# Patient Record
Sex: MALE | Race: WHITE | NOT HISPANIC OR LATINO | URBAN - METROPOLITAN AREA
[De-identification: names, ages, dates, MRNs, and addresses within clinical notes are randomized per-mention and may not be internally consistent; named-entity substitution may affect disease eponyms.]

---

## 2019-01-05 ENCOUNTER — APPOINTMENT (OUTPATIENT)
Dept: RADIOLOGY | Facility: MEDICAL CENTER | Age: 44
DRG: 963 | End: 2019-01-05
Payer: OTHER MISCELLANEOUS

## 2019-01-05 ENCOUNTER — HOSPITAL ENCOUNTER (OUTPATIENT)
Dept: RADIOLOGY | Facility: MEDICAL CENTER | Age: 44
End: 2019-01-05

## 2019-01-05 ENCOUNTER — APPOINTMENT (OUTPATIENT)
Dept: RADIOLOGY | Facility: MEDICAL CENTER | Age: 44
DRG: 963 | End: 2019-01-05
Attending: EMERGENCY MEDICINE
Payer: OTHER MISCELLANEOUS

## 2019-01-05 ENCOUNTER — HOSPITAL ENCOUNTER (INPATIENT)
Facility: MEDICAL CENTER | Age: 44
LOS: 39 days | DRG: 963 | End: 2019-02-13
Attending: EMERGENCY MEDICINE | Admitting: SURGERY
Payer: OTHER MISCELLANEOUS

## 2019-01-05 DIAGNOSIS — R40.2432 GLASGOW COMA SCALE TOTAL SCORE 3-8, AT ARRIVAL TO EMERGENCY DEPARTMENT (HCC): ICD-10-CM

## 2019-01-05 DIAGNOSIS — J96.01 ACUTE RESPIRATORY FAILURE WITH HYPOXIA (HCC): ICD-10-CM

## 2019-01-05 DIAGNOSIS — S06.33AA FOCAL HEMORRHAGIC CONTUSION OF CEREBRUM (HCC): ICD-10-CM

## 2019-01-05 DIAGNOSIS — S12.9XXA CLOSED FRACTURE OF MULTIPLE CERVICAL VERTEBRAE, INITIAL ENCOUNTER (HCC): ICD-10-CM

## 2019-01-05 DIAGNOSIS — F20.9 SCHIZOPHRENIA, UNSPECIFIED TYPE (HCC): ICD-10-CM

## 2019-01-05 PROBLEM — T14.90XA TRAUMA: Status: ACTIVE | Noted: 2019-01-05

## 2019-01-05 PROCEDURE — 5A1945Z RESPIRATORY VENTILATION, 24-96 CONSECUTIVE HOURS: ICD-10-PCS | Performed by: SURGERY

## 2019-01-05 PROCEDURE — 71045 X-RAY EXAM CHEST 1 VIEW: CPT

## 2019-01-05 PROCEDURE — 84478 ASSAY OF TRIGLYCERIDES: CPT

## 2019-01-05 PROCEDURE — 99291 CRITICAL CARE FIRST HOUR: CPT

## 2019-01-05 PROCEDURE — 85027 COMPLETE CBC AUTOMATED: CPT

## 2019-01-05 PROCEDURE — 85610 PROTHROMBIN TIME: CPT

## 2019-01-05 PROCEDURE — 82803 BLOOD GASES ANY COMBINATION: CPT

## 2019-01-05 PROCEDURE — 80307 DRUG TEST PRSMV CHEM ANLYZR: CPT

## 2019-01-05 PROCEDURE — 85730 THROMBOPLASTIN TIME PARTIAL: CPT

## 2019-01-05 PROCEDURE — 85347 COAGULATION TIME ACTIVATED: CPT

## 2019-01-05 PROCEDURE — G0390 TRAUMA RESPONS W/HOSP CRITI: HCPCS

## 2019-01-05 PROCEDURE — 770022 HCHG ROOM/CARE - ICU (200)

## 2019-01-05 PROCEDURE — 80053 COMPREHEN METABOLIC PANEL: CPT

## 2019-01-05 PROCEDURE — 85384 FIBRINOGEN ACTIVITY: CPT

## 2019-01-05 PROCEDURE — 83605 ASSAY OF LACTIC ACID: CPT

## 2019-01-05 PROCEDURE — 85576 BLOOD PLATELET AGGREGATION: CPT

## 2019-01-06 ENCOUNTER — APPOINTMENT (OUTPATIENT)
Dept: RADIOLOGY | Facility: MEDICAL CENTER | Age: 44
DRG: 963 | End: 2019-01-06
Attending: NURSE PRACTITIONER
Payer: OTHER MISCELLANEOUS

## 2019-01-06 PROBLEM — Z53.09 CONTRAINDICATION TO DEEP VEIN THROMBOSIS (DVT) PROPHYLAXIS: Status: ACTIVE | Noted: 2019-01-06

## 2019-01-06 PROBLEM — J95.821 RESPIRATORY FAILURE FOLLOWING TRAUMA AND SURGERY (HCC): Status: ACTIVE | Noted: 2019-01-06

## 2019-01-06 PROBLEM — S06.33AA FOCAL HEMORRHAGIC CONTUSION OF CEREBRUM (HCC): Status: ACTIVE | Noted: 2019-01-06

## 2019-01-06 PROBLEM — S12.9XXA CERVICAL SPINE FRACTURE (HCC): Status: ACTIVE | Noted: 2019-01-06

## 2019-01-06 LAB
ABO GROUP BLD: NORMAL
ACTION RANGE TRIGGERED IACRT: NO
ALBUMIN SERPL BCP-MCNC: 3.3 G/DL (ref 3.2–4.9)
ALBUMIN/GLOB SERPL: 1.1 G/DL
ALP SERPL-CCNC: 74 U/L (ref 30–99)
ALT SERPL-CCNC: 35 U/L (ref 2–50)
ANION GAP SERPL CALC-SCNC: 7 MMOL/L (ref 0–11.9)
APTT PPP: 31.4 SEC (ref 24.7–36)
AST SERPL-CCNC: 55 U/L (ref 12–45)
BASE EXCESS BLDA CALC-SCNC: -1 MMOL/L (ref -4–3)
BASE EXCESS BLDA CALC-SCNC: -3 MMOL/L (ref -4–3)
BILIRUB SERPL-MCNC: 0.4 MG/DL (ref 0.1–1.5)
BLD GP AB SCN SERPL QL: NORMAL
BODY TEMPERATURE: ABNORMAL CENTIGRADE
BODY TEMPERATURE: ABNORMAL DEGREES
BUN SERPL-MCNC: 13 MG/DL (ref 8–22)
CALCIUM SERPL-MCNC: 8.1 MG/DL (ref 8.5–10.5)
CFT BLD TEG: 6.5 MIN (ref 5–10)
CHLORIDE SERPL-SCNC: 104 MMOL/L (ref 96–112)
CLOT ANGLE BLD TEG: 74 DEGREES (ref 53–72)
CLOT LYSIS 30M P MA LENFR BLD TEG: 2.7 % (ref 0–8)
CO2 BLDA-SCNC: 22 MMOL/L (ref 20–33)
CO2 SERPL-SCNC: 21 MMOL/L (ref 20–33)
CREAT SERPL-MCNC: 0.76 MG/DL (ref 0.5–1.4)
CT.EXTRINSIC BLD ROTEM: 1.2 MIN (ref 1–3)
ERYTHROCYTE [DISTWIDTH] IN BLOOD BY AUTOMATED COUNT: 42.3 FL (ref 35.9–50)
ETHANOL BLD-MCNC: 0 G/DL
GLOBULIN SER CALC-MCNC: 2.9 G/DL (ref 1.9–3.5)
GLUCOSE BLD-MCNC: 109 MG/DL (ref 65–99)
GLUCOSE BLD-MCNC: 112 MG/DL (ref 65–99)
GLUCOSE BLD-MCNC: 125 MG/DL (ref 65–99)
GLUCOSE BLD-MCNC: 80 MG/DL (ref 65–99)
GLUCOSE SERPL-MCNC: 147 MG/DL (ref 65–99)
HCO3 BLDA-SCNC: 20.9 MMOL/L (ref 17–25)
HCO3 BLDA-SCNC: 22 MMOL/L (ref 17–25)
HCT VFR BLD AUTO: 35.7 % (ref 42–52)
HGB BLD-MCNC: 11.4 G/DL (ref 14–18)
HOROWITZ INDEX BLDA+IHG-RTO: 250 MM[HG]
INR PPP: 1.26 (ref 0.87–1.13)
INST. QUALIFIED PATIENT IIQPT: YES
LACTATE BLD-SCNC: 1 MMOL/L (ref 0.5–2)
MCF BLD TEG: 73.5 MM (ref 50–70)
MCH RBC QN AUTO: 28.9 PG (ref 27–33)
MCHC RBC AUTO-ENTMCNC: 31.9 G/DL (ref 33.7–35.3)
MCV RBC AUTO: 90.6 FL (ref 81.4–97.8)
O2/TOTAL GAS SETTING VFR VENT: 100 % (ref 30–60)
O2/TOTAL GAS SETTING VFR VENT: 40 %
PA AA BLD-ACNC: 0 %
PA ADP BLD-ACNC: 27.7 %
PCO2 BLDA: 31.2 MMHG (ref 26–37)
PCO2 BLDA: 33.8 MMHG (ref 26–37)
PCO2 TEMP ADJ BLDA: 29.2 MMHG (ref 26–37)
PH BLDA: 7.43 [PH] (ref 7.4–7.5)
PH BLDA: 7.44 [PH] (ref 7.4–7.5)
PH TEMP ADJ BLDA: 7.46 [PH] (ref 7.4–7.5)
PLATELET # BLD AUTO: 475 K/UL (ref 164–446)
PMV BLD AUTO: 8.2 FL (ref 9–12.9)
PO2 BLDA: 100 MMHG (ref 64–87)
PO2 BLDA: 192 MMHG (ref 64–87)
PO2 TEMP ADJ BLDA: 92 MMHG (ref 64–87)
POTASSIUM SERPL-SCNC: 4 MMOL/L (ref 3.6–5.5)
PROT SERPL-MCNC: 6.2 G/DL (ref 6–8.2)
PROTHROMBIN TIME: 15.9 SEC (ref 12–14.6)
RBC # BLD AUTO: 3.94 M/UL (ref 4.7–6.1)
RH BLD: NORMAL
SAO2 % BLDA: 98 % (ref 93–99)
SAO2 % BLDA: 99 % (ref 93–99)
SODIUM SERPL-SCNC: 132 MMOL/L (ref 135–145)
SPECIMEN DRAWN FROM PATIENT: ABNORMAL
TEG ALGORITHM TGALG: ABNORMAL
TRIGL SERPL-MCNC: 43 MG/DL (ref 0–149)
WBC # BLD AUTO: 21.6 K/UL (ref 4.8–10.8)

## 2019-01-06 PROCEDURE — 72128 CT CHEST SPINE W/O DYE: CPT

## 2019-01-06 PROCEDURE — 72125 CT NECK SPINE W/O DYE: CPT

## 2019-01-06 PROCEDURE — 71260 CT THORAX DX C+: CPT

## 2019-01-06 PROCEDURE — 72156 MRI NECK SPINE W/O & W/DYE: CPT

## 2019-01-06 PROCEDURE — 82962 GLUCOSE BLOOD TEST: CPT | Mod: 91

## 2019-01-06 PROCEDURE — 86901 BLOOD TYPING SEROLOGIC RH(D): CPT

## 2019-01-06 PROCEDURE — A9585 GADOBUTROL INJECTION: HCPCS | Performed by: NURSE PRACTITIONER

## 2019-01-06 PROCEDURE — 700101 HCHG RX REV CODE 250: Performed by: NURSE PRACTITIONER

## 2019-01-06 PROCEDURE — 86850 RBC ANTIBODY SCREEN: CPT

## 2019-01-06 PROCEDURE — 99291 CRITICAL CARE FIRST HOUR: CPT | Performed by: SURGERY

## 2019-01-06 PROCEDURE — 82803 BLOOD GASES ANY COMBINATION: CPT

## 2019-01-06 PROCEDURE — 36600 WITHDRAWAL OF ARTERIAL BLOOD: CPT

## 2019-01-06 PROCEDURE — 72131 CT LUMBAR SPINE W/O DYE: CPT

## 2019-01-06 PROCEDURE — 700111 HCHG RX REV CODE 636 W/ 250 OVERRIDE (IP): Performed by: SURGERY

## 2019-01-06 PROCEDURE — 700105 HCHG RX REV CODE 258: Performed by: NURSE PRACTITIONER

## 2019-01-06 PROCEDURE — 700117 HCHG RX CONTRAST REV CODE 255: Performed by: NURSE PRACTITIONER

## 2019-01-06 PROCEDURE — 86900 BLOOD TYPING SEROLOGIC ABO: CPT

## 2019-01-06 PROCEDURE — 70450 CT HEAD/BRAIN W/O DYE: CPT

## 2019-01-06 PROCEDURE — 770022 HCHG ROOM/CARE - ICU (200)

## 2019-01-06 PROCEDURE — 94002 VENT MGMT INPAT INIT DAY: CPT

## 2019-01-06 PROCEDURE — 700117 HCHG RX CONTRAST REV CODE 255: Performed by: EMERGENCY MEDICINE

## 2019-01-06 PROCEDURE — 71045 X-RAY EXAM CHEST 1 VIEW: CPT

## 2019-01-06 PROCEDURE — 700111 HCHG RX REV CODE 636 W/ 250 OVERRIDE (IP): Performed by: NURSE PRACTITIONER

## 2019-01-06 RX ORDER — DOCUSATE SODIUM 100 MG/1
100 CAPSULE, LIQUID FILLED ORAL 2 TIMES DAILY
Status: DISCONTINUED | OUTPATIENT
Start: 2019-01-06 | End: 2019-02-13 | Stop reason: HOSPADM

## 2019-01-06 RX ORDER — BISACODYL 10 MG
10 SUPPOSITORY, RECTAL RECTAL
Status: DISCONTINUED | OUTPATIENT
Start: 2019-01-06 | End: 2019-02-13 | Stop reason: HOSPADM

## 2019-01-06 RX ORDER — GADOBUTROL 604.72 MG/ML
6 INJECTION INTRAVENOUS ONCE
Status: COMPLETED | OUTPATIENT
Start: 2019-01-06 | End: 2019-01-06

## 2019-01-06 RX ORDER — ONDANSETRON 2 MG/ML
4 INJECTION INTRAMUSCULAR; INTRAVENOUS EVERY 4 HOURS PRN
Status: DISCONTINUED | OUTPATIENT
Start: 2019-01-06 | End: 2019-01-31

## 2019-01-06 RX ORDER — AMOXICILLIN 250 MG
1 CAPSULE ORAL NIGHTLY
Status: DISCONTINUED | OUTPATIENT
Start: 2019-01-06 | End: 2019-02-13 | Stop reason: HOSPADM

## 2019-01-06 RX ORDER — BACITRACIN ZINC AND POLYMYXIN B SULFATE 500; 1000 [USP'U]/G; [USP'U]/G
OINTMENT TOPICAL 3 TIMES DAILY
Status: DISCONTINUED | OUTPATIENT
Start: 2019-01-06 | End: 2019-01-20

## 2019-01-06 RX ORDER — DEXTROSE MONOHYDRATE 25 G/50ML
25 INJECTION, SOLUTION INTRAVENOUS
Status: DISCONTINUED | OUTPATIENT
Start: 2019-01-06 | End: 2019-01-09

## 2019-01-06 RX ORDER — SODIUM CHLORIDE 9 MG/ML
INJECTION, SOLUTION INTRAVENOUS CONTINUOUS
Status: DISCONTINUED | OUTPATIENT
Start: 2019-01-06 | End: 2019-01-10

## 2019-01-06 RX ORDER — ENEMA 19; 7 G/133ML; G/133ML
1 ENEMA RECTAL
Status: DISCONTINUED | OUTPATIENT
Start: 2019-01-06 | End: 2019-02-13 | Stop reason: HOSPADM

## 2019-01-06 RX ORDER — FAMOTIDINE 20 MG/1
20 TABLET, FILM COATED ORAL 2 TIMES DAILY
Status: DISCONTINUED | OUTPATIENT
Start: 2019-01-06 | End: 2019-01-08

## 2019-01-06 RX ORDER — POLYETHYLENE GLYCOL 3350 17 G/17G
1 POWDER, FOR SOLUTION ORAL 2 TIMES DAILY
Status: DISCONTINUED | OUTPATIENT
Start: 2019-01-06 | End: 2019-02-13 | Stop reason: HOSPADM

## 2019-01-06 RX ORDER — AMOXICILLIN 250 MG
1 CAPSULE ORAL
Status: DISCONTINUED | OUTPATIENT
Start: 2019-01-06 | End: 2019-02-13 | Stop reason: HOSPADM

## 2019-01-06 RX ADMIN — Medication 1 EACH: at 05:02

## 2019-01-06 RX ADMIN — FAMOTIDINE 20 MG: 10 INJECTION INTRAVENOUS at 17:28

## 2019-01-06 RX ADMIN — SODIUM CHLORIDE: 9 INJECTION, SOLUTION INTRAVENOUS at 01:05

## 2019-01-06 RX ADMIN — FENTANYL CITRATE 100 MCG: 50 INJECTION, SOLUTION INTRAMUSCULAR; INTRAVENOUS at 13:14

## 2019-01-06 RX ADMIN — FENTANYL CITRATE 100 MCG: 50 INJECTION, SOLUTION INTRAMUSCULAR; INTRAVENOUS at 16:57

## 2019-01-06 RX ADMIN — FENTANYL CITRATE 100 MCG: 50 INJECTION, SOLUTION INTRAMUSCULAR; INTRAVENOUS at 15:46

## 2019-01-06 RX ADMIN — FENTANYL CITRATE 100 MCG: 50 INJECTION, SOLUTION INTRAMUSCULAR; INTRAVENOUS at 12:00

## 2019-01-06 RX ADMIN — FENTANYL CITRATE 100 MCG: 50 INJECTION, SOLUTION INTRAMUSCULAR; INTRAVENOUS at 14:33

## 2019-01-06 RX ADMIN — SODIUM CHLORIDE 500 MG: 9 INJECTION, SOLUTION INTRAVENOUS at 01:06

## 2019-01-06 RX ADMIN — FENTANYL CITRATE 100 MCG: 50 INJECTION, SOLUTION INTRAMUSCULAR; INTRAVENOUS at 05:02

## 2019-01-06 RX ADMIN — SODIUM CHLORIDE 500 MG: 9 INJECTION, SOLUTION INTRAVENOUS at 12:00

## 2019-01-06 RX ADMIN — Medication 1 EACH: at 17:28

## 2019-01-06 RX ADMIN — FENTANYL CITRATE 75 MCG/HR: 50 INJECTION, SOLUTION INTRAMUSCULAR; INTRAVENOUS at 17:16

## 2019-01-06 RX ADMIN — PROPOFOL 40 MCG/KG/MIN: 10 INJECTION, EMULSION INTRAVENOUS at 13:49

## 2019-01-06 RX ADMIN — Medication 1 EACH: at 12:00

## 2019-01-06 RX ADMIN — FENTANYL CITRATE 100 MCG: 50 INJECTION, SOLUTION INTRAMUSCULAR; INTRAVENOUS at 02:35

## 2019-01-06 RX ADMIN — FENTANYL CITRATE 100 MCG: 50 INJECTION, SOLUTION INTRAMUSCULAR; INTRAVENOUS at 01:01

## 2019-01-06 RX ADMIN — PROPOFOL 5 MCG/KG/MIN: 10 INJECTION, EMULSION INTRAVENOUS at 01:29

## 2019-01-06 RX ADMIN — GADOBUTROL 6 ML: 604.72 INJECTION INTRAVENOUS at 03:00

## 2019-01-06 RX ADMIN — PROPOFOL 55 MCG/KG/MIN: 10 INJECTION, EMULSION INTRAVENOUS at 20:26

## 2019-01-06 RX ADMIN — SODIUM CHLORIDE: 9 INJECTION, SOLUTION INTRAVENOUS at 20:26

## 2019-01-06 RX ADMIN — FAMOTIDINE 20 MG: 10 INJECTION INTRAVENOUS at 05:02

## 2019-01-06 RX ADMIN — FENTANYL CITRATE 100 MCG: 50 INJECTION, SOLUTION INTRAMUSCULAR; INTRAVENOUS at 03:41

## 2019-01-06 RX ADMIN — FENTANYL CITRATE 100 MCG: 50 INJECTION, SOLUTION INTRAMUSCULAR; INTRAVENOUS at 07:23

## 2019-01-06 RX ADMIN — IOHEXOL 100 ML: 350 INJECTION, SOLUTION INTRAVENOUS at 00:03

## 2019-01-06 RX ADMIN — FENTANYL CITRATE 100 MCG: 50 INJECTION, SOLUTION INTRAMUSCULAR; INTRAVENOUS at 09:10

## 2019-01-06 RX ADMIN — PROPOFOL 55 MCG/KG/MIN: 10 INJECTION, EMULSION INTRAVENOUS at 08:03
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
## 2019-01-06 NOTE — PROGRESS NOTES
Patient seen and examined with nurse at bedside.     With propofol off patient moves all 4 extremities spontaneously.

## 2019-01-06 NOTE — PROGRESS NOTES
2 RN skin check complete     Facial swelling to cheeks, eyes with scleral edema   Abrasion under left eye  Abrasion to forehead with dried blood   Abrasion to posterior head  Abrasion to right hip  Abrasion to right buttock  Abrasions to neck and bilateral shoulders  Abrasions to bilateral knees

## 2019-01-06 NOTE — CARE PLAN
Problem: Infection  Goal: Will remain free from infection    Intervention: Assess signs and symptoms of infection  PT at increased risk of infection due to presence of invasive lines and devices. Interventions in place.       Problem: Pain Management  Goal: Pain level will decrease to patient's comfort goal    Intervention: Follow pain managment plan developed in collaboration with patient and Interdisciplinary Team  Pt medicated for pain according to pain management plan and MAR.

## 2019-01-06 NOTE — DISCHARGE PLANNING
"Medical Social Work    ED MSW received phone contact from pt's sister Jenny ([phone]) calling to report pt's former SO Magalis Bonds is en route to the hospital to p/u pt's son who is also a pt (Jerome Bonds,  11/3/2010) and she wants to make sure Magalis Bonds has NO CONTACT or UPDATES with/regarding pt. Jenny states Magalis \"left pt for another man\" and does not want her involved with pt's care in any way. Updated unit MICHI Fonseca.    "

## 2019-01-06 NOTE — ASSESSMENT & PLAN NOTE
Intubated at [facility] for airway protection  1/7 Liberated from ventilator  Continue aggressive pulmonary hygiene
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
## 2019-01-06 NOTE — PROGRESS NOTES
1737 RN spoke with pts sister Jenny Zambrano via telephone. Sister very upset and crying over the telephone. Sister updated by RN on pt status. Pt's brothers information obtained by RN. Pts brother is Griffin Vamshi: [phone] RN contacted  Brenda to give additional information.
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
## 2019-01-06 NOTE — PROGRESS NOTES
[place] J collar applied to pt maintaining c spine precautions. Back of neck and torso unable to be visualized by oncoming RN at this time due to instability.

## 2019-01-06 NOTE — PROGRESS NOTES
Patient arrived to [facility] at 0005 via gurney. C-spine and log roll precautions in place. Patient placed on ICU monitor.     HR 64  /105  O2 sat: 100%  Temp 96.3f fung temp  Weight 56.8 kg    Patient moving bilateral lower extremities with deep stimulation

## 2019-01-06 NOTE — ASSESSMENT & PLAN NOTE
Referring facility imaging with acute fractures involving the spinous processes of C5, C6, and C7. The fracture of C7 extends into the lamina bilaterally.  Repeat C-spine CT with acute fractures of C5-C7 transverse processes. No other cervical spine fractures. No listhesis.  MRI with abnormal fluid in the disc spaces C2-3 suggestive of fracture through the disc space. Anterior longitudinal ligament posterior longitudinal ligaments at C2-3 injury. Possible disruption of the ligamentum flavum at C7-T1. Diffuse prevertebral soft tissue edema extending from C1 to C6, diffuse posterior paraspinous soft tissue edema. Possible cord contusion at C5-6 demonstrates and T2  Non-operative management.  Cervical collar at all times. 10 lb lifting restriction.  Follow up CT C spine in 6-8 weeks.   Fidel Coleman MD. Neurosurgery (sign off 1/20).
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
## 2019-01-06 NOTE — PROGRESS NOTES
0128 Pt kicking legs and thrashing about in hospital bed, pt still not following commands and already received analgesia for discomfort. Propofol drip started.

## 2019-01-06 NOTE — PROGRESS NOTES
Dr. Coleman at bedside for exam, per MD OK to elevate HOB 30 degrees, collar on at all times, OK for lovenox for neurosurg standpoint, continue Q1hr neuro checks, no need for neurosurg interventions at this time.

## 2019-01-06 NOTE — PROGRESS NOTES
Report received from [facility] RN. Dr Graves at bedside. Per MD pt to receive MRI. Pt not following commands, moving LE's spontaneously and LUE to noxious stimuli
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
## 2019-01-06 NOTE — CARE PLAN
Problem: Ventilation Defect:  Goal: Ability to achieve and maintain unassisted ventilation or tolerate decreased levels of ventilator support    Intervention: Manage ventilation therapy by monitoring diagnostic test results  Adult Ventilation Update    Total Vent Days: 1    Patient Lines/Drains/Airways Status    Active Airway     Name: Placement date: Placement time: Site: Days:    Airway ETT Oral 7.0 01/05/19      Oral   1            Length of Weaning Trial         Sputum/Suction     Sputum Amount: Large (01/06/19 0100)  Sputum Color: Clear;White (01/06/19 0100)  Sputum Consistency: Thick;Thin (01/06/19 0100)    Mobility  Level of Mobility: Level I (01/06/19 0200)  Activity Performed: Unable to mobilize (01/06/19 0200)  Pt Calls for Assistance: No (01/06/19 0200)  Reason Not Mobilized: Unstable condition (01/06/19 0200)  Mobilization Comments: unstable C spine, stat MRI ordered (01/06/19 0200)    Events/Summary/Plan: VT to 410cc (01/06/19 0450)

## 2019-01-06 NOTE — PROGRESS NOTES
0345 RN spoke with Dr Coleman via telephone. MD viewing pts MRI results. Per MD okay to keep MAP > 80 at this time. No other orders received.
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
## 2019-01-06 NOTE — ASSESSMENT & PLAN NOTE
Initial systemic anticoagulation contraindicated secondary to elevated bleeding risk.   1/7 Trauma screening bilateral lower extremity venous duplex negative for above knee DVT.  1/8 Chemical DVT prophylaxis (Lovenox) initiated.  Ambulate TID.

## 2019-01-06 NOTE — PROGRESS NOTES
0225 Pt transport at bedside. Pt transferred to MRI gurney maintaining C spine and log roll precautions. Propofol set to flow dial at 50 ml/hr. Pt attached to monitor and ventilator for transport. Transported by RN, RT and pt transport.    0240 Pt placed in MRI scanner.
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
## 2019-01-06 NOTE — CARE PLAN
Problem: Communication  Goal: The ability to communicate needs accurately and effectively will improve  Outcome: PROGRESSING SLOWER THAN EXPECTED  Pt not following commands, MD aware    Problem: Pain Management  Goal: Pain level will decrease to patient's comfort goal  Outcome: PROGRESSING AS EXPECTED  CPOT in use, PRN meds as needed

## 2019-01-07 ENCOUNTER — APPOINTMENT (OUTPATIENT)
Dept: RADIOLOGY | Facility: MEDICAL CENTER | Age: 44
DRG: 963 | End: 2019-01-07
Attending: NURSE PRACTITIONER
Payer: OTHER MISCELLANEOUS

## 2019-01-07 PROBLEM — E87.1 HYPONATREMIA: Status: ACTIVE | Noted: 2019-01-07

## 2019-01-07 PROBLEM — E83.39 HYPOPHOSPHATEMIA: Status: ACTIVE | Noted: 2019-01-07

## 2019-01-07 LAB
ABO GROUP BLD: NORMAL
ALBUMIN SERPL BCP-MCNC: 2.7 G/DL (ref 3.2–4.9)
ALBUMIN/GLOB SERPL: 0.9 G/DL
ALP SERPL-CCNC: 64 U/L (ref 30–99)
ALT SERPL-CCNC: 25 U/L (ref 2–50)
ANION GAP SERPL CALC-SCNC: 10 MMOL/L (ref 0–11.9)
AST SERPL-CCNC: 38 U/L (ref 12–45)
BASOPHILS # BLD AUTO: 0.1 % (ref 0–1.8)
BASOPHILS # BLD: 0.03 K/UL (ref 0–0.12)
BILIRUB SERPL-MCNC: 0.4 MG/DL (ref 0.1–1.5)
BUN SERPL-MCNC: 12 MG/DL (ref 8–22)
CALCIUM SERPL-MCNC: 8 MG/DL (ref 8.5–10.5)
CHLORIDE SERPL-SCNC: 99 MMOL/L (ref 96–112)
CO2 SERPL-SCNC: 21 MMOL/L (ref 20–33)
CREAT SERPL-MCNC: 0.7 MG/DL (ref 0.5–1.4)
EOSINOPHIL # BLD AUTO: 0 K/UL (ref 0–0.51)
EOSINOPHIL NFR BLD: 0 % (ref 0–6.9)
ERYTHROCYTE [DISTWIDTH] IN BLOOD BY AUTOMATED COUNT: 44.7 FL (ref 35.9–50)
GLOBULIN SER CALC-MCNC: 3.1 G/DL (ref 1.9–3.5)
GLUCOSE BLD-MCNC: 101 MG/DL (ref 65–99)
GLUCOSE BLD-MCNC: 80 MG/DL (ref 65–99)
GLUCOSE BLD-MCNC: 86 MG/DL (ref 65–99)
GLUCOSE BLD-MCNC: 96 MG/DL (ref 65–99)
GLUCOSE SERPL-MCNC: 84 MG/DL (ref 65–99)
HCT VFR BLD AUTO: 34.5 % (ref 42–52)
HGB BLD-MCNC: 11.1 G/DL (ref 14–18)
IMM GRANULOCYTES # BLD AUTO: 0.09 K/UL (ref 0–0.11)
IMM GRANULOCYTES NFR BLD AUTO: 0.4 % (ref 0–0.9)
LYMPHOCYTES # BLD AUTO: 0.98 K/UL (ref 1–4.8)
LYMPHOCYTES NFR BLD: 4.9 % (ref 22–41)
MAGNESIUM SERPL-MCNC: 2 MG/DL (ref 1.5–2.5)
MCH RBC QN AUTO: 29.9 PG (ref 27–33)
MCHC RBC AUTO-ENTMCNC: 32.2 G/DL (ref 33.7–35.3)
MCV RBC AUTO: 93 FL (ref 81.4–97.8)
MONOCYTES # BLD AUTO: 1.24 K/UL (ref 0–0.85)
MONOCYTES NFR BLD AUTO: 6.2 % (ref 0–13.4)
NEUTROPHILS # BLD AUTO: 17.79 K/UL (ref 1.82–7.42)
NEUTROPHILS NFR BLD: 88.4 % (ref 44–72)
NRBC # BLD AUTO: 0 K/UL
NRBC BLD-RTO: 0 /100 WBC
PHOSPHATE SERPL-MCNC: 1.9 MG/DL (ref 2.5–4.5)
PLATELET # BLD AUTO: 448 K/UL (ref 164–446)
PMV BLD AUTO: 8.7 FL (ref 9–12.9)
POTASSIUM SERPL-SCNC: 3.8 MMOL/L (ref 3.6–5.5)
PROT SERPL-MCNC: 5.8 G/DL (ref 6–8.2)
RBC # BLD AUTO: 3.71 M/UL (ref 4.7–6.1)
RH BLD: NORMAL
SODIUM SERPL-SCNC: 130 MMOL/L (ref 135–145)
WBC # BLD AUTO: 20.1 K/UL (ref 4.8–10.8)

## 2019-01-07 PROCEDURE — 82962 GLUCOSE BLOOD TEST: CPT | Mod: 91

## 2019-01-07 PROCEDURE — 700101 HCHG RX REV CODE 250: Performed by: SURGERY

## 2019-01-07 PROCEDURE — 700101 HCHG RX REV CODE 250: Performed by: NURSE PRACTITIONER

## 2019-01-07 PROCEDURE — 94760 N-INVAS EAR/PLS OXIMETRY 1: CPT

## 2019-01-07 PROCEDURE — 80053 COMPREHEN METABOLIC PANEL: CPT

## 2019-01-07 PROCEDURE — 700105 HCHG RX REV CODE 258: Performed by: NURSE PRACTITIONER

## 2019-01-07 PROCEDURE — 84100 ASSAY OF PHOSPHORUS: CPT

## 2019-01-07 PROCEDURE — 71045 X-RAY EXAM CHEST 1 VIEW: CPT

## 2019-01-07 PROCEDURE — 94150 VITAL CAPACITY TEST: CPT

## 2019-01-07 PROCEDURE — 99291 CRITICAL CARE FIRST HOUR: CPT | Performed by: SURGERY

## 2019-01-07 PROCEDURE — 93970 EXTREMITY STUDY: CPT

## 2019-01-07 PROCEDURE — 700111 HCHG RX REV CODE 636 W/ 250 OVERRIDE (IP): Performed by: NURSE PRACTITIONER

## 2019-01-07 PROCEDURE — 31720 CLEARANCE OF AIRWAYS: CPT

## 2019-01-07 PROCEDURE — 94003 VENT MGMT INPAT SUBQ DAY: CPT

## 2019-01-07 PROCEDURE — 83735 ASSAY OF MAGNESIUM: CPT

## 2019-01-07 PROCEDURE — 770022 HCHG ROOM/CARE - ICU (200)

## 2019-01-07 PROCEDURE — 700111 HCHG RX REV CODE 636 W/ 250 OVERRIDE (IP): Performed by: SURGERY

## 2019-01-07 PROCEDURE — 700105 HCHG RX REV CODE 258: Performed by: SURGERY

## 2019-01-07 PROCEDURE — 85025 COMPLETE CBC W/AUTO DIFF WBC: CPT

## 2019-01-07 PROCEDURE — 93970 EXTREMITY STUDY: CPT | Mod: 26 | Performed by: SURGERY

## 2019-01-07 RX ADMIN — PROPOFOL 65 MCG/KG/MIN: 10 INJECTION, EMULSION INTRAVENOUS at 01:28

## 2019-01-07 RX ADMIN — FAMOTIDINE 20 MG: 10 INJECTION INTRAVENOUS at 05:16

## 2019-01-07 RX ADMIN — PROPOFOL 25 MCG/KG/MIN: 10 INJECTION, EMULSION INTRAVENOUS at 09:11

## 2019-01-07 RX ADMIN — POTASSIUM PHOSPHATE, MONOBASIC AND POTASSIUM PHOSPHATE, DIBASIC 30 MMOL: 224; 236 INJECTION, SOLUTION INTRAVENOUS at 11:39

## 2019-01-07 RX ADMIN — SODIUM CHLORIDE 500 MG: 9 INJECTION, SOLUTION INTRAVENOUS at 13:13

## 2019-01-07 RX ADMIN — FAMOTIDINE 20 MG: 10 INJECTION INTRAVENOUS at 17:07

## 2019-01-07 RX ADMIN — FENTANYL CITRATE 100 MCG: 50 INJECTION, SOLUTION INTRAMUSCULAR; INTRAVENOUS at 12:58

## 2019-01-07 RX ADMIN — FENTANYL CITRATE 100 MCG: 50 INJECTION, SOLUTION INTRAMUSCULAR; INTRAVENOUS at 20:26

## 2019-01-07 RX ADMIN — Medication 1 EACH: at 17:07

## 2019-01-07 RX ADMIN — Medication 1 EACH: at 05:16

## 2019-01-07 RX ADMIN — Medication 1 EACH: at 11:40

## 2019-01-07 RX ADMIN — FENTANYL CITRATE 50 MCG/HR: 50 INJECTION, SOLUTION INTRAMUSCULAR; INTRAVENOUS at 13:10

## 2019-01-07 RX ADMIN — FENTANYL CITRATE 100 MCG: 50 INJECTION, SOLUTION INTRAMUSCULAR; INTRAVENOUS at 11:29

## 2019-01-07 RX ADMIN — FENTANYL CITRATE 100 MCG: 50 INJECTION, SOLUTION INTRAMUSCULAR; INTRAVENOUS at 13:56

## 2019-01-07 RX ADMIN — SODIUM CHLORIDE 500 MG: 9 INJECTION, SOLUTION INTRAVENOUS at 01:28

## 2019-01-07 ASSESSMENT — COPD QUESTIONNAIRES
DO YOU EVER COUGH UP ANY MUCUS OR PHLEGM?: NO/ONLY WITH OCCASIONAL COLDS OR INFECTIONS
COPD SCREENING SCORE: 3
DURING THE PAST 4 WEEKS HOW MUCH DID YOU FEEL SHORT OF BREATH: NONE/LITTLE OF THE TIME
HAVE YOU SMOKED AT LEAST 100 CIGARETTES IN YOUR ENTIRE LIFE: YES

## 2019-01-07 ASSESSMENT — PAIN SCALES - GENERAL
PAINLEVEL_OUTOF10: 6
PAINLEVEL_OUTOF10: 8
PAINLEVEL_OUTOF10: 8
PAINLEVEL_OUTOF10: 10
PAINLEVEL_OUTOF10: 6
PAINLEVEL_OUTOF10: 8
PAINLEVEL_OUTOF10: 8
PAINLEVEL_OUTOF10: 6
PAINLEVEL_OUTOF10: 7

## 2019-01-07 ASSESSMENT — PULMONARY FUNCTION TESTS: FVC: 860

## 2019-01-07 ASSESSMENT — LIFESTYLE VARIABLES: EVER_SMOKED: YES

## 2019-01-07 NOTE — CARE PLAN
Problem: Ventilation Defect:  Goal: Ability to achieve and maintain unassisted ventilation or tolerate decreased levels of ventilator support    Intervention: Support and monitor invasive and noninvasive mechanical ventilation  Adult Ventilation Update    Total Vent Days: 2    Patient Lines/Drains/Airways Status    Active Airway     Name: Placement date: Placement time: Site: Days:    Airway ETT Oral 7.0 01/05/19      Oral   2               Patient failed trials because of Barriers to Wean: Other (Comments) (not today per MD) (01/06/19 1330)    Sputum/Suction    Sputum Amount: Moderate;Large (01/06/19 1600)  Sputum Color: Clear;White (01/06/19 1600)  Sputum Consistency: Thick;Thin (01/06/19 1600)    Mobility  Activity Performed: Unable to mobilize (01/06/19 0800)  Reason Not Mobilized: Unstable condition (01/06/19 0800)  Mobilization Comments: new admission, possible cord contusion, Cspine log roll (01/06/19 0800)    Events/Summary/Plan: no vent changes made this shift (01/06/19 1330)

## 2019-01-07 NOTE — THERAPY
SPEECH PATHOLOGY:  Order received for swallow evaluation.  Patient was extubated this morning--RN reports patient is sleepy and not managing secretions well. Will see patient in am for swallow evaluation. Thank you for the consult.

## 2019-01-07 NOTE — CARE PLAN
Problem: Infection  Goal: Will remain free from infection  Outcome: PROGRESSING AS EXPECTED  Standard and VAP precautions in place     Problem: Venous Thromboembolism (VTW)/Deep Vein Thrombosis (DVT) Prevention:  Goal: Patient will participate in Venous Thrombosis (VTE)/Deep Vein Thrombosis (DVT)Prevention Measures  Outcome: PROGRESSING AS EXPECTED  SCDs in place, lovenox admin per MAR
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
## 2019-01-07 NOTE — CARE PLAN
Problem: Infection  Goal: Will remain free from infection    Intervention: Assess signs and symptoms of infection  Pt at increased risk of infection due to presence of invasive lines and devices. Interventions in place.      Problem: Pain Management  Goal: Pain level will decrease to patient's comfort goal    Intervention: Follow pain managment plan developed in collaboration with patient and Interdisciplinary Team  Pt on Fentanyl drip per MD orders.
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
## 2019-01-07 NOTE — PROGRESS NOTES
2 RN skin check performed. The following skin conditions noted:    - abrasions to face  - abrasions to torso, R hip  - abrasions R buttocks  - redness, blanching under cervical collar, mepilex applied

## 2019-01-07 NOTE — PROGRESS NOTES
1742 Dr Tucker at pt bedside. Per MD okay to discontinue log roll orders and maintain C spine precautions only. Nursing communication placed.

## 2019-01-07 NOTE — RESPIRATORY CARE
Ventilator Weaning Update    Patient is on vent day 3.  SBT was tolerated for a minimum of   hours on settings of 5/8.    Wean parameters for this SBT were:  #FVC / Vital Capacity (liters) : 860 (01/07/19 0901)  NIF (cm H2O) : -24 (01/07/19 0901)  Rapid Shallow Breathing Index (RR/VT): 75 (01/07/19 0901)  RR (bpm): 24 (01/07/19 0901)  Spontaneous VE: 7.8 (01/07/19 0901)  Spontaneous VT: 345 (01/07/19 0901)    Barriers to Wean: Other (Comments) (not today per MD)

## 2019-01-08 ENCOUNTER — APPOINTMENT (OUTPATIENT)
Dept: RADIOLOGY | Facility: MEDICAL CENTER | Age: 44
DRG: 963 | End: 2019-01-08
Attending: NURSE PRACTITIONER
Payer: OTHER MISCELLANEOUS

## 2019-01-08 ENCOUNTER — APPOINTMENT (OUTPATIENT)
Dept: RADIOLOGY | Facility: MEDICAL CENTER | Age: 44
DRG: 963 | End: 2019-01-08
Attending: SURGERY
Payer: OTHER MISCELLANEOUS

## 2019-01-08 LAB
ACTION RANGE TRIGGERED IACRT: NO
ALBUMIN SERPL BCP-MCNC: 3.1 G/DL (ref 3.2–4.9)
ALBUMIN/GLOB SERPL: 0.9 G/DL
ALP SERPL-CCNC: 66 U/L (ref 30–99)
ALT SERPL-CCNC: 27 U/L (ref 2–50)
ANION GAP SERPL CALC-SCNC: 10 MMOL/L (ref 0–11.9)
AST SERPL-CCNC: 27 U/L (ref 12–45)
BASE EXCESS BLDA CALC-SCNC: 0 MMOL/L (ref -4–3)
BASOPHILS # BLD AUTO: 0.2 % (ref 0–1.8)
BASOPHILS # BLD: 0.04 K/UL (ref 0–0.12)
BILIRUB SERPL-MCNC: 0.6 MG/DL (ref 0.1–1.5)
BODY TEMPERATURE: NORMAL DEGREES
BUN SERPL-MCNC: 13 MG/DL (ref 8–22)
CALCIUM SERPL-MCNC: 8.5 MG/DL (ref 8.5–10.5)
CHLORIDE SERPL-SCNC: 103 MMOL/L (ref 96–112)
CO2 BLDA-SCNC: 24 MMOL/L (ref 20–33)
CO2 SERPL-SCNC: 23 MMOL/L (ref 20–33)
CREAT SERPL-MCNC: 0.61 MG/DL (ref 0.5–1.4)
EOSINOPHIL # BLD AUTO: 0.01 K/UL (ref 0–0.51)
EOSINOPHIL NFR BLD: 0.1 % (ref 0–6.9)
ERYTHROCYTE [DISTWIDTH] IN BLOOD BY AUTOMATED COUNT: 42.8 FL (ref 35.9–50)
GLOBULIN SER CALC-MCNC: 3.4 G/DL (ref 1.9–3.5)
GLUCOSE BLD-MCNC: 104 MG/DL (ref 65–99)
GLUCOSE BLD-MCNC: 81 MG/DL (ref 65–99)
GLUCOSE BLD-MCNC: 82 MG/DL (ref 65–99)
GLUCOSE BLD-MCNC: 85 MG/DL (ref 65–99)
GLUCOSE SERPL-MCNC: 94 MG/DL (ref 65–99)
HCO3 BLDA-SCNC: 23.4 MMOL/L (ref 17–25)
HCT VFR BLD AUTO: 34.7 % (ref 42–52)
HGB BLD-MCNC: 11.2 G/DL (ref 14–18)
HOROWITZ INDEX BLDA+IHG-RTO: 253 MM[HG]
IMM GRANULOCYTES # BLD AUTO: 0.07 K/UL (ref 0–0.11)
IMM GRANULOCYTES NFR BLD AUTO: 0.4 % (ref 0–0.9)
INST. QUALIFIED PATIENT IIQPT: YES
LYMPHOCYTES # BLD AUTO: 1.21 K/UL (ref 1–4.8)
LYMPHOCYTES NFR BLD: 7 % (ref 22–41)
MCH RBC QN AUTO: 29.5 PG (ref 27–33)
MCHC RBC AUTO-ENTMCNC: 32.3 G/DL (ref 33.7–35.3)
MCV RBC AUTO: 91.3 FL (ref 81.4–97.8)
MONOCYTES # BLD AUTO: 1.04 K/UL (ref 0–0.85)
MONOCYTES NFR BLD AUTO: 6 % (ref 0–13.4)
NEUTROPHILS # BLD AUTO: 14.84 K/UL (ref 1.82–7.42)
NEUTROPHILS NFR BLD: 86.3 % (ref 44–72)
NRBC # BLD AUTO: 0 K/UL
NRBC BLD-RTO: 0 /100 WBC
O2/TOTAL GAS SETTING VFR VENT: 32 %
PCO2 BLDA: 34 MMHG (ref 26–37)
PCO2 TEMP ADJ BLDA: 34 MMHG (ref 26–37)
PH BLDA: 7.45 [PH] (ref 7.4–7.5)
PH TEMP ADJ BLDA: 7.45 [PH] (ref 7.4–7.5)
PLATELET # BLD AUTO: 458 K/UL (ref 164–446)
PMV BLD AUTO: 8.7 FL (ref 9–12.9)
PO2 BLDA: 81 MMHG (ref 64–87)
PO2 TEMP ADJ BLDA: 81 MMHG (ref 64–87)
POTASSIUM SERPL-SCNC: 3.9 MMOL/L (ref 3.6–5.5)
PROT SERPL-MCNC: 6.5 G/DL (ref 6–8.2)
RBC # BLD AUTO: 3.8 M/UL (ref 4.7–6.1)
SAO2 % BLDA: 96 % (ref 93–99)
SODIUM SERPL-SCNC: 136 MMOL/L (ref 135–145)
SPECIMEN DRAWN FROM PATIENT: NORMAL
WBC # BLD AUTO: 17.2 K/UL (ref 4.8–10.8)

## 2019-01-08 PROCEDURE — 85025 COMPLETE CBC W/AUTO DIFF WBC: CPT

## 2019-01-08 PROCEDURE — 700111 HCHG RX REV CODE 636 W/ 250 OVERRIDE (IP): Performed by: SURGERY

## 2019-01-08 PROCEDURE — 94760 N-INVAS EAR/PLS OXIMETRY 1: CPT

## 2019-01-08 PROCEDURE — 36600 WITHDRAWAL OF ARTERIAL BLOOD: CPT

## 2019-01-08 PROCEDURE — 700101 HCHG RX REV CODE 250

## 2019-01-08 PROCEDURE — 700101 HCHG RX REV CODE 250: Performed by: SURGERY

## 2019-01-08 PROCEDURE — 92610 EVALUATE SWALLOWING FUNCTION: CPT

## 2019-01-08 PROCEDURE — 82803 BLOOD GASES ANY COMBINATION: CPT

## 2019-01-08 PROCEDURE — 71045 X-RAY EXAM CHEST 1 VIEW: CPT

## 2019-01-08 PROCEDURE — 770022 HCHG ROOM/CARE - ICU (200)

## 2019-01-08 PROCEDURE — 94669 MECHANICAL CHEST WALL OSCILL: CPT

## 2019-01-08 PROCEDURE — 700102 HCHG RX REV CODE 250 W/ 637 OVERRIDE(OP): Performed by: SURGERY

## 2019-01-08 PROCEDURE — 700111 HCHG RX REV CODE 636 W/ 250 OVERRIDE (IP): Performed by: NURSE PRACTITIONER

## 2019-01-08 PROCEDURE — 700102 HCHG RX REV CODE 250 W/ 637 OVERRIDE(OP)

## 2019-01-08 PROCEDURE — 80053 COMPREHEN METABOLIC PANEL: CPT

## 2019-01-08 PROCEDURE — 97166 OT EVAL MOD COMPLEX 45 MIN: CPT

## 2019-01-08 PROCEDURE — 99233 SBSQ HOSP IP/OBS HIGH 50: CPT | Performed by: SURGERY

## 2019-01-08 PROCEDURE — 94667 MNPJ CHEST WALL 1ST: CPT

## 2019-01-08 PROCEDURE — 700101 HCHG RX REV CODE 250: Performed by: NURSE PRACTITIONER

## 2019-01-08 PROCEDURE — 97162 PT EVAL MOD COMPLEX 30 MIN: CPT

## 2019-01-08 PROCEDURE — 700105 HCHG RX REV CODE 258: Performed by: NURSE PRACTITIONER

## 2019-01-08 PROCEDURE — 94640 AIRWAY INHALATION TREATMENT: CPT

## 2019-01-08 PROCEDURE — 700111 HCHG RX REV CODE 636 W/ 250 OVERRIDE (IP)

## 2019-01-08 PROCEDURE — 82962 GLUCOSE BLOOD TEST: CPT | Mod: 91

## 2019-01-08 RX ORDER — DEXAMETHASONE SODIUM PHOSPHATE 4 MG/ML
4 INJECTION, SOLUTION INTRA-ARTICULAR; INTRALESIONAL; INTRAMUSCULAR; INTRAVENOUS; SOFT TISSUE ONCE
Status: COMPLETED | OUTPATIENT
Start: 2019-01-08 | End: 2019-01-08

## 2019-01-08 RX ORDER — IPRATROPIUM BROMIDE AND ALBUTEROL SULFATE 2.5; .5 MG/3ML; MG/3ML
3 SOLUTION RESPIRATORY (INHALATION)
Status: COMPLETED | OUTPATIENT
Start: 2019-01-08 | End: 2019-01-08

## 2019-01-08 RX ORDER — DEXAMETHASONE SODIUM PHOSPHATE 4 MG/ML
2 INJECTION, SOLUTION INTRA-ARTICULAR; INTRALESIONAL; INTRAMUSCULAR; INTRAVENOUS; SOFT TISSUE EVERY 6 HOURS
Status: COMPLETED | OUTPATIENT
Start: 2019-01-08 | End: 2019-01-09

## 2019-01-08 RX ORDER — MIDAZOLAM HYDROCHLORIDE 1 MG/ML
INJECTION INTRAMUSCULAR; INTRAVENOUS
Status: COMPLETED
Start: 2019-01-08 | End: 2019-01-08

## 2019-01-08 RX ORDER — MIDAZOLAM HYDROCHLORIDE 1 MG/ML
1-5 INJECTION INTRAMUSCULAR; INTRAVENOUS ONCE
Status: COMPLETED | OUTPATIENT
Start: 2019-01-08 | End: 2019-01-08

## 2019-01-08 RX ORDER — IPRATROPIUM BROMIDE AND ALBUTEROL SULFATE 2.5; .5 MG/3ML; MG/3ML
SOLUTION RESPIRATORY (INHALATION)
Status: COMPLETED
Start: 2019-01-08 | End: 2019-01-08

## 2019-01-08 RX ADMIN — FENTANYL CITRATE 100 MCG: 50 INJECTION, SOLUTION INTRAMUSCULAR; INTRAVENOUS at 19:31

## 2019-01-08 RX ADMIN — FENTANYL CITRATE 100 MCG: 50 INJECTION, SOLUTION INTRAMUSCULAR; INTRAVENOUS at 13:40

## 2019-01-08 RX ADMIN — SODIUM CHLORIDE 500 MG: 9 INJECTION, SOLUTION INTRAVENOUS at 12:12

## 2019-01-08 RX ADMIN — SODIUM CHLORIDE 500 MG: 9 INJECTION, SOLUTION INTRAVENOUS at 00:13

## 2019-01-08 RX ADMIN — DEXAMETHASONE SODIUM PHOSPHATE 2 MG: 4 INJECTION, SOLUTION INTRA-ARTICULAR; INTRALESIONAL; INTRAMUSCULAR; INTRAVENOUS; SOFT TISSUE at 12:11

## 2019-01-08 RX ADMIN — MIDAZOLAM HYDROCHLORIDE 5 MG: 1 INJECTION, SOLUTION INTRAMUSCULAR; INTRAVENOUS at 15:00

## 2019-01-08 RX ADMIN — ENOXAPARIN SODIUM 30 MG: 100 INJECTION SUBCUTANEOUS at 10:57

## 2019-01-08 RX ADMIN — FENTANYL CITRATE 100 MCG: 50 INJECTION, SOLUTION INTRAMUSCULAR; INTRAVENOUS at 00:14

## 2019-01-08 RX ADMIN — IPRATROPIUM BROMIDE AND ALBUTEROL SULFATE 3 ML: .5; 3 SOLUTION RESPIRATORY (INHALATION) at 15:48

## 2019-01-08 RX ADMIN — ENOXAPARIN SODIUM 30 MG: 100 INJECTION SUBCUTANEOUS at 17:44

## 2019-01-08 RX ADMIN — RACEPINEPHRINE HYDROCHLORIDE 0.5 ML: 11.25 SOLUTION RESPIRATORY (INHALATION) at 16:02

## 2019-01-08 RX ADMIN — FENTANYL CITRATE 100 MCG: 50 INJECTION, SOLUTION INTRAMUSCULAR; INTRAVENOUS at 02:42

## 2019-01-08 RX ADMIN — IPRATROPIUM BROMIDE AND ALBUTEROL SULFATE 3 ML: .5; 3 SOLUTION RESPIRATORY (INHALATION) at 15:54

## 2019-01-08 RX ADMIN — RACEPINEPHRINE HYDROCHLORIDE: 11.25 SOLUTION RESPIRATORY (INHALATION) at 16:15

## 2019-01-08 RX ADMIN — DEXAMETHASONE SODIUM PHOSPHATE 4 MG: 4 INJECTION, SOLUTION INTRA-ARTICULAR; INTRALESIONAL; INTRAMUSCULAR; INTRAVENOUS; SOFT TISSUE at 15:59

## 2019-01-08 RX ADMIN — DEXAMETHASONE SODIUM PHOSPHATE 2 MG: 4 INJECTION, SOLUTION INTRA-ARTICULAR; INTRALESIONAL; INTRAMUSCULAR; INTRAVENOUS; SOFT TISSUE at 17:44

## 2019-01-08 RX ADMIN — Medication 1 EACH: at 17:44

## 2019-01-08 RX ADMIN — MIDAZOLAM HYDROCHLORIDE 5 MG: 1 INJECTION INTRAMUSCULAR; INTRAVENOUS at 15:00

## 2019-01-08 RX ADMIN — Medication 1 EACH: at 05:07

## 2019-01-08 RX ADMIN — FENTANYL CITRATE 100 MCG: 50 INJECTION, SOLUTION INTRAMUSCULAR; INTRAVENOUS at 20:40

## 2019-01-08 RX ADMIN — FAMOTIDINE 20 MG: 10 INJECTION INTRAVENOUS at 05:07

## 2019-01-08 RX ADMIN — Medication 1 EACH: at 12:11

## 2019-01-08 ASSESSMENT — GAIT ASSESSMENTS
GAIT LEVEL OF ASSIST: MINIMAL ASSIST
DISTANCE (FEET): 4

## 2019-01-08 ASSESSMENT — PAIN SCALES - GENERAL
PAINLEVEL_OUTOF10: 8
PAINLEVEL_OUTOF10: 7
PAINLEVEL_OUTOF10: 6
PAINLEVEL_OUTOF10: 8
PAINLEVEL_OUTOF10: 6
PAINLEVEL_OUTOF10: 6
PAINLEVEL_OUTOF10: 8
PAINLEVEL_OUTOF10: 7
PAINLEVEL_OUTOF10: 6
PAINLEVEL_OUTOF10: 8
PAINLEVEL_OUTOF10: 8

## 2019-01-08 ASSESSMENT — ACTIVITIES OF DAILY LIVING (ADL): TOILETING: INDEPENDENT

## 2019-01-08 ASSESSMENT — COGNITIVE AND FUNCTIONAL STATUS - GENERAL
HELP NEEDED FOR BATHING: A LOT
WALKING IN HOSPITAL ROOM: A LITTLE
STANDING UP FROM CHAIR USING ARMS: A LITTLE
SUGGESTED CMS G CODE MODIFIER MOBILITY: CK
TOILETING: A LOT
PERSONAL GROOMING: A LITTLE
DAILY ACTIVITIY SCORE: 16
MOBILITY SCORE: 19
CLIMB 3 TO 5 STEPS WITH RAILING: A LITTLE
DRESSING REGULAR UPPER BODY CLOTHING: A LITTLE
TURNING FROM BACK TO SIDE WHILE IN FLAT BAD: A LITTLE
SUGGESTED CMS G CODE MODIFIER DAILY ACTIVITY: CK
DRESSING REGULAR LOWER BODY CLOTHING: A LOT
MOVING TO AND FROM BED TO CHAIR: A LITTLE

## 2019-01-08 NOTE — THERAPY
"Physical Therapy Evaluation completed.   Bed Mobility:  Supine to Sit: Minimal Assist  Transfers: Sit to Stand: Stand by Assist  Gait: Level Of Assist: Minimal Assist with No Equipment Needed     4ft  Plan of Care: Will benefit from Physical Therapy 3 times per week  Discharge Recommendations: Equipment: Will Continue to Assess for Equipment Needs. Post-acute therapy  Recommend inpatient transitional care services for continued physical therapy services.      See \"Rehab Therapy-Acute\" Patient Summary Report for complete documentation.     "

## 2019-01-08 NOTE — DISCHARGE PLANNING
Care Transition Team Assessment    LSW met with pt at bedside and obtained the information used in this assessment. Pt was a little difficult to understand and speech was slurred. Pt stated that his physical address is [address] [place] [address] in [place]. Pt lives in a house by himself. Pt does not have a  Pharmacy that he uses. Prior to current hospitalization, pt was completely independent in ADLS/IADLS. Pt drives and is able to attend necessary MD appointments. Pt stated that he is currently unemployed. LSW asked how pt is able to support himself, pt stated that Magalis (ex gf) pays his bills every month. Pt stated they just  a few months ago. Pt stated that he also receives Welfare but was not able to clarify what for as his son lives with Magalis. Pt has financial concerns. Pt has a limited support system near him. Pt denies any hx of substance use to this LSW but reported to bedside RN that he used Heroin 4 days ago. Pt stated that he has been dx with Schizophrenia and does not currently take meds. Pt let this LSW know that he has [employer] and provided SSN. LSW spoke to PFA who was able to find his insurance under his SSN and will update the chart. LSW added pt to difficult d/c list as there are multiple barriers and pt does not currently reside locally.     LSW will attempt to get more information from pt when speech is more clear.         Information Source  Orientation : Oriented x 4  Information Given By: Patient  Informant's Name:  (Flash)  Who is responsible for making decisions for patient? : Patient    Readmission Evaluation  Is this a readmission?: No    Elopement Risk  Legal Hold: No  Ambulatory or Self Mobile in Wheelchair: No-Not an Elopement Risk  Elopement Risk: Not at Risk for Elopement    Interdisciplinary Discharge Planning  Lives with - Patient's Self Care Capacity: Child Less than 18 Years of Age  Housing / Facility: Unable To Determine At This Time    Discharge Preparedness  What is your plan  after discharge?: Uncertain - pending medical team collaboration  What are your discharge supports?: Other (comment)  Prior Functional Level: Ambulatory, Independent with Activities of Daily Living, Independent with Medication Management  Difficulity with ADLs: Bathing, Dressing, Toileting, Walking  Difficulity with IADLs: Cooking, Driving, Keeping track of finances, Laundry, Managing medication, Shopping, Using the telephone or computer    Functional Assesment  Prior Functional Level: Ambulatory, Independent with Activities of Daily Living, Independent with Medication Management    Finances  Financial Barriers to Discharge: Yes  Average Monthly Income:  (0)  Prescription Coverage: Yes              Advance Directive  Advance Directive?: None         Psychological Assessment  History of Substance Abuse: Heroin  History of Psychiatric Problems: Yes  Non-compliant with Treatment: No  Newly Diagnosed Illness: Yes    Discharge Risks or Barriers  Discharge risks or barriers?: No PCP, Uninsured / underinsured, Transportation, Substance abuse, Mental health, Post-acute placement / services, Lives alone, no community support, Complex medical needs  Patient risk factors: Complex medical needs, Lack of outside supports, Mental health, No PCP, Substance abuse, Uninsured or underinsured    Anticipated Discharge Information  Anticipated discharge disposition: Discharge needs currently unknown
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
## 2019-01-08 NOTE — PROGRESS NOTES
This RN attempted to place cortrak but was unsuccessful.  Patient continually would cough making it difficult to pass the cortrak.  MD and bedside RN aware.
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
## 2019-01-08 NOTE — PROGRESS NOTES
2 RN skin assessment completed. Pt with the following noted:    - bruising/swelling to posterior neck under c collar. Mepilex in place.    - abrasions to top of head and face.    - abrasions to UE's, R hip, R buttocks

## 2019-01-08 NOTE — THERAPY
"Speech Language Therapy Clinical Swallow Evaluation completed.    Functional Status: Patient seen for swallow evaluation this date. Patient was sleeping, but easily aroused and able to sustain arousal for the session.  Patient oriented X4 and able to follow commands. Vocal quality wet and gurgly with fairly consistent throat clearing with suction to manage secretions.  Presentation of PO consisted of 4 single ice chips and three 1/4 teaspoon boluses of nectar thick liquids.  Patient with delayed onset of swallow followed by 2-4 quick, subsequent swallows to clear audible pharyngeal residue.  He had significant wet voice and throat clearing following swallow which is consistent with possible penetration/aspiration.  Oral suctioning was needed following each presentation to further clear secretions and residue.  At this time, would recommend NPO with cortrak.  SLP will continue to follow for dysphagia therapy.  He would also benefit from a cognitive linguistic evaluation.  Thank you for the consult.    Recommendations - Diet:  NPO with consideration of cortrak                            Medication Administration:  Via alternative route--consider cortrak  Plan of Care: Will benefit from Speech Therapy 5 times per week  Post-Acute Therapy: Recommend inpatient transitional care services for continued speech therapy services.        See \"Rehab Therapy-Acute\" Patient Summary Report for complete documentation.   "

## 2019-01-08 NOTE — THERAPY
"Occupational Therapy Evaluation completed.   Functional Status:  Min/mod A with ADLs and txfs  Plan of Care: Will benefit from Occupational Therapy 3 times per week  Discharge Recommendations:  Equipment: Will Continue to Assess for Equipment Needs. Post-acute therapy Discharge to a transitional care facility for continued therapy services.    See \"Rehab Therapy-Acute\" Patient Summary Report for complete documentation.    "

## 2019-01-08 NOTE — PROGRESS NOTES
0470 Dr Coleman at pt bedside. Per MD pt can mobilize ad antonio, get up to chair. C collar to be kept on for fractures. Also okay to start anticoagulation per MD.

## 2019-01-08 NOTE — CARE PLAN
Problem: Infection  Goal: Will remain free from infection    Intervention: Assess signs and symptoms of infection  Pt at increased risk for infection due to presence of invasive lines and devices. Interventions in place.      Problem: Pain Management  Goal: Pain level will decrease to patient's comfort goal    Intervention: Follow pain managment plan developed in collaboration with patient and Interdisciplinary Team  Pt with Fentanyl IV drip per MD order. Pain assessment completed regularly by RN

## 2019-01-08 NOTE — PROGRESS NOTES
"Pt turning completely on side in bed despite nurse education provided to maintain cervical alignment and being repositioned several times maintaining c spine precautions. Pt verbalizes understanding but states \" I like to sleep on my side\". Pt again provided education regarding injuries and need to maintain cervical stability.  "

## 2019-01-08 NOTE — PROGRESS NOTES
"2000 Pt attempting to cough up secretions with some success. Frequently self suctioning with bob, but stating that there's \"too much in there\". RN provided NT suctioning for pt with a small amount of white, bloody secretions noted.    2030 RN asks RT to also try NT suctioning. Per RT had small amount of yellow secretions out.  "

## 2019-01-09 ENCOUNTER — APPOINTMENT (OUTPATIENT)
Dept: RADIOLOGY | Facility: MEDICAL CENTER | Age: 44
DRG: 963 | End: 2019-01-09
Attending: NURSE PRACTITIONER
Payer: OTHER MISCELLANEOUS

## 2019-01-09 PROBLEM — Z78.9 NO CONTRAINDICATION TO DEEP VEIN THROMBOSIS (DVT) PROPHYLAXIS: Status: ACTIVE | Noted: 2019-01-06

## 2019-01-09 PROBLEM — R13.12 OROPHARYNGEAL DYSPHAGIA: Status: ACTIVE | Noted: 2019-01-09

## 2019-01-09 PROBLEM — E87.1 HYPONATREMIA: Status: RESOLVED | Noted: 2019-01-07 | Resolved: 2019-01-09

## 2019-01-09 LAB
ALBUMIN SERPL BCP-MCNC: 3 G/DL (ref 3.2–4.9)
ALBUMIN/GLOB SERPL: 0.8 G/DL
ALP SERPL-CCNC: 62 U/L (ref 30–99)
ALT SERPL-CCNC: 26 U/L (ref 2–50)
ANION GAP SERPL CALC-SCNC: 9 MMOL/L (ref 0–11.9)
AST SERPL-CCNC: 21 U/L (ref 12–45)
BASOPHILS # BLD AUTO: 0.1 % (ref 0–1.8)
BASOPHILS # BLD: 0.01 K/UL (ref 0–0.12)
BILIRUB SERPL-MCNC: 0.4 MG/DL (ref 0.1–1.5)
BUN SERPL-MCNC: 22 MG/DL (ref 8–22)
CALCIUM SERPL-MCNC: 8.7 MG/DL (ref 8.5–10.5)
CHLORIDE SERPL-SCNC: 103 MMOL/L (ref 96–112)
CO2 SERPL-SCNC: 23 MMOL/L (ref 20–33)
CREAT SERPL-MCNC: 0.64 MG/DL (ref 0.5–1.4)
CRP SERPL HS-MCNC: 20.24 MG/DL (ref 0–0.75)
EOSINOPHIL # BLD AUTO: 0.01 K/UL (ref 0–0.51)
EOSINOPHIL NFR BLD: 0.1 % (ref 0–6.9)
ERYTHROCYTE [DISTWIDTH] IN BLOOD BY AUTOMATED COUNT: 41.7 FL (ref 35.9–50)
GLOBULIN SER CALC-MCNC: 3.7 G/DL (ref 1.9–3.5)
GLUCOSE BLD-MCNC: 104 MG/DL (ref 65–99)
GLUCOSE BLD-MCNC: 124 MG/DL (ref 65–99)
GLUCOSE BLD-MCNC: 96 MG/DL (ref 65–99)
GLUCOSE SERPL-MCNC: 122 MG/DL (ref 65–99)
HCT VFR BLD AUTO: 35 % (ref 42–52)
HGB BLD-MCNC: 11.5 G/DL (ref 14–18)
IMM GRANULOCYTES # BLD AUTO: 0.08 K/UL (ref 0–0.11)
IMM GRANULOCYTES NFR BLD AUTO: 0.5 % (ref 0–0.9)
LYMPHOCYTES # BLD AUTO: 0.75 K/UL (ref 1–4.8)
LYMPHOCYTES NFR BLD: 4.9 % (ref 22–41)
MCH RBC QN AUTO: 29.9 PG (ref 27–33)
MCHC RBC AUTO-ENTMCNC: 32.9 G/DL (ref 33.7–35.3)
MCV RBC AUTO: 90.9 FL (ref 81.4–97.8)
MONOCYTES # BLD AUTO: 0.42 K/UL (ref 0–0.85)
MONOCYTES NFR BLD AUTO: 2.7 % (ref 0–13.4)
NEUTROPHILS # BLD AUTO: 14.03 K/UL (ref 1.82–7.42)
NEUTROPHILS NFR BLD: 91.7 % (ref 44–72)
NRBC # BLD AUTO: 0 K/UL
NRBC BLD-RTO: 0 /100 WBC
PLATELET # BLD AUTO: 456 K/UL (ref 164–446)
PMV BLD AUTO: 8.7 FL (ref 9–12.9)
POTASSIUM SERPL-SCNC: 4.5 MMOL/L (ref 3.6–5.5)
PREALB SERPL-MCNC: 3 MG/DL (ref 18–38)
PROT SERPL-MCNC: 6.7 G/DL (ref 6–8.2)
RBC # BLD AUTO: 3.85 M/UL (ref 4.7–6.1)
SODIUM SERPL-SCNC: 135 MMOL/L (ref 135–145)
WBC # BLD AUTO: 15.3 K/UL (ref 4.8–10.8)

## 2019-01-09 PROCEDURE — 700102 HCHG RX REV CODE 250 W/ 637 OVERRIDE(OP): Performed by: NURSE PRACTITIONER

## 2019-01-09 PROCEDURE — 92526 ORAL FUNCTION THERAPY: CPT

## 2019-01-09 PROCEDURE — 700111 HCHG RX REV CODE 636 W/ 250 OVERRIDE (IP): Performed by: SURGERY

## 2019-01-09 PROCEDURE — 700105 HCHG RX REV CODE 258: Performed by: NURSE PRACTITIONER

## 2019-01-09 PROCEDURE — 93005 ELECTROCARDIOGRAM TRACING: CPT | Performed by: NURSE PRACTITIONER

## 2019-01-09 PROCEDURE — A9270 NON-COVERED ITEM OR SERVICE: HCPCS | Performed by: NURSE PRACTITIONER

## 2019-01-09 PROCEDURE — 700112 HCHG RX REV CODE 229: Performed by: NURSE PRACTITIONER

## 2019-01-09 PROCEDURE — 700111 HCHG RX REV CODE 636 W/ 250 OVERRIDE (IP): Performed by: NURSE PRACTITIONER

## 2019-01-09 PROCEDURE — 94640 AIRWAY INHALATION TREATMENT: CPT

## 2019-01-09 PROCEDURE — 86140 C-REACTIVE PROTEIN: CPT

## 2019-01-09 PROCEDURE — 94668 MNPJ CHEST WALL SBSQ: CPT

## 2019-01-09 PROCEDURE — 99233 SBSQ HOSP IP/OBS HIGH 50: CPT | Performed by: SURGERY

## 2019-01-09 PROCEDURE — 80053 COMPREHEN METABOLIC PANEL: CPT

## 2019-01-09 PROCEDURE — 90471 IMMUNIZATION ADMIN: CPT

## 2019-01-09 PROCEDURE — 85025 COMPLETE CBC W/AUTO DIFF WBC: CPT

## 2019-01-09 PROCEDURE — 3E02340 INTRODUCTION OF INFLUENZA VACCINE INTO MUSCLE, PERCUTANEOUS APPROACH: ICD-10-PCS | Performed by: SURGERY

## 2019-01-09 PROCEDURE — 84134 ASSAY OF PREALBUMIN: CPT

## 2019-01-09 PROCEDURE — 770006 HCHG ROOM/CARE - MED/SURG/GYN SEMI*

## 2019-01-09 PROCEDURE — 90686 IIV4 VACC NO PRSV 0.5 ML IM: CPT | Performed by: SURGERY

## 2019-01-09 PROCEDURE — 700101 HCHG RX REV CODE 250: Performed by: NURSE PRACTITIONER

## 2019-01-09 PROCEDURE — 82962 GLUCOSE BLOOD TEST: CPT | Mod: 91

## 2019-01-09 PROCEDURE — 94760 N-INVAS EAR/PLS OXIMETRY 1: CPT

## 2019-01-09 PROCEDURE — 93010 ELECTROCARDIOGRAM REPORT: CPT | Performed by: INTERNAL MEDICINE

## 2019-01-09 RX ORDER — METAXALONE 800 MG/1
800 TABLET ORAL 3 TIMES DAILY
Status: DISCONTINUED | OUTPATIENT
Start: 2019-01-09 | End: 2019-01-10

## 2019-01-09 RX ORDER — OXYCODONE HYDROCHLORIDE 10 MG/1
10 TABLET ORAL EVERY 4 HOURS PRN
Status: DISCONTINUED | OUTPATIENT
Start: 2019-01-09 | End: 2019-01-13

## 2019-01-09 RX ORDER — CALCIUM CARBONATE 500 MG/1
500 TABLET, CHEWABLE ORAL DAILY
Status: DISCONTINUED | OUTPATIENT
Start: 2019-01-09 | End: 2019-01-10

## 2019-01-09 RX ORDER — MORPHINE SULFATE 4 MG/ML
2-4 INJECTION, SOLUTION INTRAMUSCULAR; INTRAVENOUS
Status: DISCONTINUED | OUTPATIENT
Start: 2019-01-09 | End: 2019-01-10

## 2019-01-09 RX ORDER — OXYCODONE HYDROCHLORIDE 5 MG/1
5 TABLET ORAL EVERY 4 HOURS PRN
Status: DISCONTINUED | OUTPATIENT
Start: 2019-01-09 | End: 2019-01-13

## 2019-01-09 RX ADMIN — SODIUM CHLORIDE 500 MG: 9 INJECTION, SOLUTION INTRAVENOUS at 00:06

## 2019-01-09 RX ADMIN — ENOXAPARIN SODIUM 30 MG: 100 INJECTION SUBCUTANEOUS at 05:07

## 2019-01-09 RX ADMIN — METHOCARBAMOL 1000 MG: 100 INJECTION INTRAMUSCULAR; INTRAVENOUS at 10:49

## 2019-01-09 RX ADMIN — Medication: at 05:07

## 2019-01-09 RX ADMIN — DOCUSATE SODIUM 100 MG: 100 CAPSULE, LIQUID FILLED ORAL at 17:03

## 2019-01-09 RX ADMIN — SODIUM CHLORIDE 500 MG: 9 INJECTION, SOLUTION INTRAVENOUS at 13:58

## 2019-01-09 RX ADMIN — INFLUENZA A VIRUS A/MICHIGAN/45/2015 X-275 (H1N1) ANTIGEN (FORMALDEHYDE INACTIVATED), INFLUENZA A VIRUS A/SINGAPORE/INFIMH-16-0019/2016 IVR-186 (H3N2) ANTIGEN (FORMALDEHYDE INACTIVATED), INFLUENZA B VIRUS B/PHUKET/3073/2013 ANTIGEN (FORMALDEHYDE INACTIVATED), AND INFLUENZA B VIRUS B/MARYLAND/15/2016 BX-69A ANTIGEN (FORMALDEHYDE INACTIVATED) 0.5 ML: 15; 15; 15; 15 INJECTION, SUSPENSION INTRAMUSCULAR at 12:01

## 2019-01-09 RX ADMIN — FENTANYL CITRATE 100 MCG: 50 INJECTION, SOLUTION INTRAMUSCULAR; INTRAVENOUS at 03:00

## 2019-01-09 RX ADMIN — Medication 1 EACH: at 18:04

## 2019-01-09 RX ADMIN — ENOXAPARIN SODIUM 30 MG: 100 INJECTION SUBCUTANEOUS at 17:03

## 2019-01-09 RX ADMIN — OXYCODONE HYDROCHLORIDE 10 MG: 10 TABLET ORAL at 19:31

## 2019-01-09 RX ADMIN — OXYCODONE HYDROCHLORIDE 10 MG: 10 TABLET ORAL at 15:44

## 2019-01-09 RX ADMIN — METHOCARBAMOL 1000 MG: 100 INJECTION INTRAMUSCULAR; INTRAVENOUS at 17:03

## 2019-01-09 RX ADMIN — FAMOTIDINE 20 MG: 10 INJECTION, SOLUTION INTRAVENOUS at 22:42

## 2019-01-09 RX ADMIN — FENTANYL CITRATE 50 MCG: 50 INJECTION, SOLUTION INTRAMUSCULAR; INTRAVENOUS at 22:10

## 2019-01-09 RX ADMIN — POLYETHYLENE GLYCOL 3350 1 PACKET: 17 POWDER, FOR SOLUTION ORAL at 17:02

## 2019-01-09 RX ADMIN — DEXAMETHASONE SODIUM PHOSPHATE 2 MG: 4 INJECTION, SOLUTION INTRA-ARTICULAR; INTRALESIONAL; INTRAMUSCULAR; INTRAVENOUS; SOFT TISSUE at 17:02

## 2019-01-09 RX ADMIN — LIDOCAINE HYDROCHLORIDE 30 ML: 20 SOLUTION OROPHARYNGEAL at 22:42

## 2019-01-09 RX ADMIN — SENNOSIDES AND DOCUSATE SODIUM 1 TABLET: 8.6; 5 TABLET ORAL at 19:30

## 2019-01-09 RX ADMIN — FENTANYL CITRATE 100 MCG: 50 INJECTION, SOLUTION INTRAMUSCULAR; INTRAVENOUS at 13:46

## 2019-01-09 RX ADMIN — DEXAMETHASONE SODIUM PHOSPHATE 2 MG: 4 INJECTION, SOLUTION INTRA-ARTICULAR; INTRALESIONAL; INTRAMUSCULAR; INTRAVENOUS; SOFT TISSUE at 00:06

## 2019-01-09 RX ADMIN — Medication 1 EACH: at 11:56

## 2019-01-09 RX ADMIN — OXYCODONE HYDROCHLORIDE 10 MG: 10 TABLET ORAL at 23:34

## 2019-01-09 RX ADMIN — FENTANYL CITRATE 100 MCG: 50 INJECTION, SOLUTION INTRAMUSCULAR; INTRAVENOUS at 08:31

## 2019-01-09 RX ADMIN — DEXAMETHASONE SODIUM PHOSPHATE 2 MG: 4 INJECTION, SOLUTION INTRA-ARTICULAR; INTRALESIONAL; INTRAMUSCULAR; INTRAVENOUS; SOFT TISSUE at 05:06

## 2019-01-09 RX ADMIN — SODIUM CHLORIDE: 9 INJECTION, SOLUTION INTRAVENOUS at 14:04

## 2019-01-09 RX ADMIN — DEXAMETHASONE SODIUM PHOSPHATE 2 MG: 4 INJECTION, SOLUTION INTRA-ARTICULAR; INTRALESIONAL; INTRAMUSCULAR; INTRAVENOUS; SOFT TISSUE at 11:56

## 2019-01-09 RX ADMIN — FENTANYL CITRATE 100 MCG: 50 INJECTION, SOLUTION INTRAMUSCULAR; INTRAVENOUS at 10:49

## 2019-01-09 ASSESSMENT — PAIN SCALES - GENERAL
PAINLEVEL_OUTOF10: 7
PAINLEVEL_OUTOF10: 8
PAINLEVEL_OUTOF10: 9
PAINLEVEL_OUTOF10: 2
PAINLEVEL_OUTOF10: 8
PAINLEVEL_OUTOF10: 6
PAINLEVEL_OUTOF10: 8
PAINLEVEL_OUTOF10: 9
PAINLEVEL_OUTOF10: 8

## 2019-01-09 ASSESSMENT — ENCOUNTER SYMPTOMS
SHORTNESS OF BREATH: 0
FOCAL WEAKNESS: 0
ABDOMINAL PAIN: 0
MYALGIAS: 1
DOUBLE VISION: 0
FEVER: 0
TINGLING: 1
CHILLS: 0
NECK PAIN: 1
NAUSEA: 0
PALPITATIONS: 0
VOMITING: 0
ROS GI COMMENTS: NO BM SINCE ADMISSION

## 2019-01-09 ASSESSMENT — LIFESTYLE VARIABLES
CONSUMPTION TOTAL: POSITIVE
HAVE YOU EVER FELT YOU SHOULD CUT DOWN ON YOUR DRINKING: YES
ON A TYPICAL DAY WHEN YOU DRINK ALCOHOL HOW MANY DRINKS DO YOU HAVE: 3
HOW MANY TIMES IN THE PAST YEAR HAVE YOU HAD 5 OR MORE DRINKS IN A DAY: 0
EVER HAD A DRINK FIRST THING IN THE MORNING TO STEADY YOUR NERVES TO GET RID OF A HANGOVER: NO
DOES PATIENT WANT TO STOP DRINKING: YES
TOTAL SCORE: 2
AVERAGE NUMBER OF DAYS PER WEEK YOU HAVE A DRINK CONTAINING ALCOHOL: 2
DOES PATIENT WANT TO TALK TO SOMEONE ABOUT QUITTING: YES
HAVE PEOPLE ANNOYED YOU BY CRITICIZING YOUR DRINKING: NO
ALCOHOL_USE: YES
TOTAL SCORE: 2
EVER FELT BAD OR GUILTY ABOUT YOUR DRINKING: YES
TOTAL SCORE: 2

## 2019-01-09 ASSESSMENT — PATIENT HEALTH QUESTIONNAIRE - PHQ9
SUM OF ALL RESPONSES TO PHQ9 QUESTIONS 1 AND 2: 0
1. LITTLE INTEREST OR PLEASURE IN DOING THINGS: NOT AT ALL
2. FEELING DOWN, DEPRESSED, IRRITABLE, OR HOPELESS: NOT AT ALL

## 2019-01-09 NOTE — CARE PLAN
Problem: Safety  Goal: Free from accidental injury  Outcome: PROGRESSING AS EXPECTED  Intervention:   Pt call light and personal belongings within reach, monitor on and patient’s cardiac rhythm being monitored.   Bed in low position, non skid socks on, bed alarm on.  Pt currently able to communicate need for assistance.   Pt near nursing station.    Problem: Pain  Goal: Alleviation of Pain or a reduction in pain to the patient’s comfort goal  Outcome: PROGRESSING AS EXPECTED  Interventions:   0-10 pain scale used to assess pain every 4 hours and PRN.   Non-pharmacological pain measures taken such as emotional support.    Problem: Skin Integrity  Goal: Skin integrity is maintained or improved.   Outcome: PROGRESSING AS EXPECTED  Interventions:  Implemented turning schedule of at least every 2 hours to ensure limited pressure on bony prominences.   Skin kept dry and clean to prevent skin breakdown.   Pillows used to alter pressure points.  Barrier wipes and barrier cream used as skin protectant.   Mepilex under c-collar.

## 2019-01-09 NOTE — PROGRESS NOTES
2 RN Skin check    - C-collar assessed and no skin breakdown noted   - sacrum has abrasions  - no pressure related injury
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
## 2019-01-09 NOTE — DIETARY
Nutrition services: pt assessed for tube feeding yesterday after failing SLP evaluation.  Unable to place cortrak and pt seen again today by SLP and is now on a nectar thick full liquid diet. Expect pt to do well as he has been stating he is very hungry.

## 2019-01-09 NOTE — RESPIRATORY CARE
COPD EDUCATION by COPD CLINICAL EDUCATOR  1/9/2019 at 10:15 AM by Galina Allen     Patient reviewed by COPD education team. Patient does not qualify for COPD program.

## 2019-01-09 NOTE — PROGRESS NOTES
Pt confessed to eating a candy bar. I extensively educated him on why this is not safe for him. He instructed his family to bring him a candy bar and when I caught this action I educated his family member on why this is inappropriate and could put his life at risk.

## 2019-01-09 NOTE — ASSESSMENT & PLAN NOTE
1/8 Swallow evaluation completed, recommend NPO with Cortrak.  - Unable to place Cortrak.  1/9 Repeat swallow evaluation completed, nectar thick full liquid diet initiated.  11/14 Upgraded to D2/thin liquids.  1/18 Downgraded to D1/thin with one to one supervision.  1/23 Upgraded to D2/thin liquid diet with 1:1 assist.  1/28 Continue D2/thin liquid diet with 1:1 assist.  Speech therapy following
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
## 2019-01-09 NOTE — PROGRESS NOTES
Report given to Jorge Alberto RAMSEY.    Action items discussed:   · Swallow evaluation  · Social work consultation   · Pneumonia vaccine - not in SICU from pharmacy

## 2019-01-09 NOTE — THERAPY
"Speech Language Therapy dysphagia treatment completed.     Functional Status: Pt seen this date for repeat bedside swallow evaluation, per MD request. RN reporting unable to successfully place cortrak. Pt seen sitting upright in chair prior to transfer to the floor; very eager for PO. PO of single ice chips, NTL, and purees resulted in complete laryngeal elevation upon palpation with timely initiation of swallow trigger. No overt s/sx concerning for aspiration. Pt did require min cues to take small bites. Tsp and straw sips of thins resulted in immediate coughing, which is concerning for penetration/aspiration. At this time, pt appears appropriate to begin a modified diet of Nectar Thick Full Liquid with monitoring from nursing staff. Please hold PO with any difficulty or s/sx concerning for penetration/aspiration. Pt would also benefit from cognitive evaluation.     Recommendations:   1. NTFL with monitoring from nursing staff    Plan of Care: Will benefit from Speech Therapy 5 times per week  Post-Acute Therapy: Recommend inpatient transitional care services for continued speech therapy services.      See \"Rehab Therapy-Acute\" Patient Summary Report for complete documentation.     "
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
## 2019-01-09 NOTE — PROGRESS NOTES
2 RN skin check complete. No changes in already present abrasions to head, right hip, or back. Skin under C-Collar intact. Preventative measures in place. Will continue to monitor.

## 2019-01-09 NOTE — CARE PLAN
Problem: Communication  Goal: The ability to communicate needs accurately and effectively will improve    Intervention: [place] patient and significant other/support system to call light to alert staff of needs  Educate patient on importance of calling for assistance. Reorient and reassure patient throughout shift.       Problem: Pain Management  Goal: Pain level will decrease to patient's comfort goal    Intervention: Follow pain managment plan developed in collaboration with patient and Interdisciplinary Team  Educate patient on use of 1-10 pain scale. Administer pain medications as needed and as ordered.

## 2019-01-10 ENCOUNTER — APPOINTMENT (OUTPATIENT)
Dept: RADIOLOGY | Facility: MEDICAL CENTER | Age: 44
DRG: 963 | End: 2019-01-10
Attending: NURSE PRACTITIONER
Payer: OTHER MISCELLANEOUS

## 2019-01-10 PROBLEM — J95.821 RESPIRATORY FAILURE FOLLOWING TRAUMA AND SURGERY (HCC): Status: RESOLVED | Noted: 2019-01-06 | Resolved: 2019-01-10

## 2019-01-10 LAB
ALBUMIN SERPL BCP-MCNC: 3 G/DL (ref 3.2–4.9)
ALBUMIN/GLOB SERPL: 0.9 G/DL
ALP SERPL-CCNC: 55 U/L (ref 30–99)
ALT SERPL-CCNC: 49 U/L (ref 2–50)
ANION GAP SERPL CALC-SCNC: 6 MMOL/L (ref 0–11.9)
AST SERPL-CCNC: 29 U/L (ref 12–45)
BASOPHILS # BLD AUTO: 0.1 % (ref 0–1.8)
BASOPHILS # BLD: 0.01 K/UL (ref 0–0.12)
BILIRUB SERPL-MCNC: 0.4 MG/DL (ref 0.1–1.5)
BUN SERPL-MCNC: 27 MG/DL (ref 8–22)
CALCIUM SERPL-MCNC: 8.7 MG/DL (ref 8.5–10.5)
CHLORIDE SERPL-SCNC: 103 MMOL/L (ref 96–112)
CO2 SERPL-SCNC: 28 MMOL/L (ref 20–33)
CREAT SERPL-MCNC: 0.67 MG/DL (ref 0.5–1.4)
EKG IMPRESSION: NORMAL
EOSINOPHIL # BLD AUTO: 0 K/UL (ref 0–0.51)
EOSINOPHIL NFR BLD: 0 % (ref 0–6.9)
ERYTHROCYTE [DISTWIDTH] IN BLOOD BY AUTOMATED COUNT: 39.6 FL (ref 35.9–50)
GLOBULIN SER CALC-MCNC: 3.4 G/DL (ref 1.9–3.5)
GLUCOSE SERPL-MCNC: 117 MG/DL (ref 65–99)
HCT VFR BLD AUTO: 35.6 % (ref 42–52)
HGB BLD-MCNC: 11.9 G/DL (ref 14–18)
IMM GRANULOCYTES # BLD AUTO: 0.1 K/UL (ref 0–0.11)
IMM GRANULOCYTES NFR BLD AUTO: 0.7 % (ref 0–0.9)
LYMPHOCYTES # BLD AUTO: 1.75 K/UL (ref 1–4.8)
LYMPHOCYTES NFR BLD: 11.5 % (ref 22–41)
MCH RBC QN AUTO: 29.5 PG (ref 27–33)
MCHC RBC AUTO-ENTMCNC: 33.4 G/DL (ref 33.7–35.3)
MCV RBC AUTO: 88.1 FL (ref 81.4–97.8)
MONOCYTES # BLD AUTO: 0.9 K/UL (ref 0–0.85)
MONOCYTES NFR BLD AUTO: 5.9 % (ref 0–13.4)
NEUTROPHILS # BLD AUTO: 12.51 K/UL (ref 1.82–7.42)
NEUTROPHILS NFR BLD: 81.8 % (ref 44–72)
NRBC # BLD AUTO: 0 K/UL
NRBC BLD-RTO: 0 /100 WBC
PLATELET # BLD AUTO: 524 K/UL (ref 164–446)
PMV BLD AUTO: 8.7 FL (ref 9–12.9)
POTASSIUM SERPL-SCNC: 4.2 MMOL/L (ref 3.6–5.5)
PROT SERPL-MCNC: 6.4 G/DL (ref 6–8.2)
RBC # BLD AUTO: 4.04 M/UL (ref 4.7–6.1)
SODIUM SERPL-SCNC: 137 MMOL/L (ref 135–145)
WBC # BLD AUTO: 15.3 K/UL (ref 4.8–10.8)

## 2019-01-10 PROCEDURE — 700111 HCHG RX REV CODE 636 W/ 250 OVERRIDE (IP): Performed by: NURSE PRACTITIONER

## 2019-01-10 PROCEDURE — 700102 HCHG RX REV CODE 250 W/ 637 OVERRIDE(OP): Performed by: NURSE PRACTITIONER

## 2019-01-10 PROCEDURE — 700101 HCHG RX REV CODE 250: Performed by: NURSE PRACTITIONER

## 2019-01-10 PROCEDURE — 94760 N-INVAS EAR/PLS OXIMETRY 1: CPT

## 2019-01-10 PROCEDURE — A9270 NON-COVERED ITEM OR SERVICE: HCPCS | Performed by: NURSE PRACTITIONER

## 2019-01-10 PROCEDURE — 770006 HCHG ROOM/CARE - MED/SURG/GYN SEMI*

## 2019-01-10 PROCEDURE — 85025 COMPLETE CBC W/AUTO DIFF WBC: CPT

## 2019-01-10 PROCEDURE — 700112 HCHG RX REV CODE 229: Performed by: NURSE PRACTITIONER

## 2019-01-10 PROCEDURE — 71045 X-RAY EXAM CHEST 1 VIEW: CPT

## 2019-01-10 PROCEDURE — 700111 HCHG RX REV CODE 636 W/ 250 OVERRIDE (IP): Performed by: SURGERY

## 2019-01-10 PROCEDURE — 700105 HCHG RX REV CODE 258: Performed by: NURSE PRACTITIONER

## 2019-01-10 PROCEDURE — 80053 COMPREHEN METABOLIC PANEL: CPT

## 2019-01-10 PROCEDURE — 36415 COLL VENOUS BLD VENIPUNCTURE: CPT

## 2019-01-10 RX ORDER — ACETAMINOPHEN 325 MG/1
650 TABLET ORAL EVERY 6 HOURS
Status: DISCONTINUED | OUTPATIENT
Start: 2019-01-10 | End: 2019-01-14

## 2019-01-10 RX ORDER — ACETAMINOPHEN 325 MG/1
650 TABLET ORAL EVERY 4 HOURS PRN
Status: DISCONTINUED | OUTPATIENT
Start: 2019-01-10 | End: 2019-01-10

## 2019-01-10 RX ORDER — METHOCARBAMOL 750 MG/1
750 TABLET, FILM COATED ORAL 3 TIMES DAILY
Status: DISCONTINUED | OUTPATIENT
Start: 2019-01-10 | End: 2019-01-12

## 2019-01-10 RX ORDER — FAMOTIDINE 20 MG/1
20 TABLET, FILM COATED ORAL 2 TIMES DAILY
Status: DISCONTINUED | OUTPATIENT
Start: 2019-01-10 | End: 2019-01-24

## 2019-01-10 RX ORDER — LEVETIRACETAM 500 MG/1
500 TABLET ORAL 2 TIMES DAILY
Status: COMPLETED | OUTPATIENT
Start: 2019-01-10 | End: 2019-01-12

## 2019-01-10 RX ORDER — GABAPENTIN 100 MG/1
100 CAPSULE ORAL 3 TIMES DAILY
Status: DISCONTINUED | OUTPATIENT
Start: 2019-01-10 | End: 2019-01-12

## 2019-01-10 RX ORDER — MORPHINE SULFATE 4 MG/ML
2-4 INJECTION, SOLUTION INTRAMUSCULAR; INTRAVENOUS EVERY 6 HOURS PRN
Status: DISCONTINUED | OUTPATIENT
Start: 2019-01-10 | End: 2019-01-11

## 2019-01-10 RX ORDER — LIDOCAINE 50 MG/G
1 PATCH TOPICAL EVERY 24 HOURS
Status: DISCONTINUED | OUTPATIENT
Start: 2019-01-10 | End: 2019-01-24

## 2019-01-10 RX ADMIN — LEVETIRACETAM 500 MG: 500 TABLET ORAL at 18:29

## 2019-01-10 RX ADMIN — OXYCODONE HYDROCHLORIDE 10 MG: 10 TABLET ORAL at 14:12

## 2019-01-10 RX ADMIN — METHOCARBAMOL 1000 MG: 100 INJECTION INTRAMUSCULAR; INTRAVENOUS at 02:30

## 2019-01-10 RX ADMIN — GABAPENTIN 100 MG: 100 CAPSULE ORAL at 18:29

## 2019-01-10 RX ADMIN — FAMOTIDINE 20 MG: 20 TABLET ORAL at 18:30

## 2019-01-10 RX ADMIN — MORPHINE SULFATE 4 MG: 4 INJECTION INTRAVENOUS at 20:33

## 2019-01-10 RX ADMIN — POLYETHYLENE GLYCOL 3350 1 PACKET: 17 POWDER, FOR SOLUTION ORAL at 05:02

## 2019-01-10 RX ADMIN — SODIUM CHLORIDE: 9 INJECTION, SOLUTION INTRAVENOUS at 09:24

## 2019-01-10 RX ADMIN — Medication 1 EACH: at 05:03

## 2019-01-10 RX ADMIN — DOCUSATE SODIUM 100 MG: 100 CAPSULE, LIQUID FILLED ORAL at 05:03

## 2019-01-10 RX ADMIN — GABAPENTIN 100 MG: 100 CAPSULE ORAL at 12:51

## 2019-01-10 RX ADMIN — OXYCODONE HYDROCHLORIDE 10 MG: 10 TABLET ORAL at 05:03

## 2019-01-10 RX ADMIN — OXYCODONE HYDROCHLORIDE 10 MG: 10 TABLET ORAL at 18:29

## 2019-01-10 RX ADMIN — Medication 1 EACH: at 12:11

## 2019-01-10 RX ADMIN — METHOCARBAMOL TABLETS 750 MG: 750 TABLET, COATED ORAL at 12:51

## 2019-01-10 RX ADMIN — ENOXAPARIN SODIUM 30 MG: 100 INJECTION SUBCUTANEOUS at 05:02

## 2019-01-10 RX ADMIN — METAXALONE 800 MG: 800 TABLET ORAL at 09:23

## 2019-01-10 RX ADMIN — OXYCODONE HYDROCHLORIDE 10 MG: 10 TABLET ORAL at 09:24

## 2019-01-10 RX ADMIN — DOCUSATE SODIUM 100 MG: 100 CAPSULE, LIQUID FILLED ORAL at 18:30

## 2019-01-10 RX ADMIN — FAMOTIDINE 20 MG: 10 INJECTION, SOLUTION INTRAVENOUS at 05:03

## 2019-01-10 RX ADMIN — ACETAMINOPHEN 650 MG: 325 TABLET, FILM COATED ORAL at 12:51

## 2019-01-10 RX ADMIN — ENOXAPARIN SODIUM 30 MG: 100 INJECTION SUBCUTANEOUS at 18:29

## 2019-01-10 RX ADMIN — OXYCODONE HYDROCHLORIDE 10 MG: 10 TABLET ORAL at 22:28

## 2019-01-10 RX ADMIN — LIDOCAINE 1 PATCH: 50 PATCH TOPICAL at 12:51

## 2019-01-10 RX ADMIN — MAGNESIUM HYDROXIDE 30 ML: 400 SUSPENSION ORAL at 05:02

## 2019-01-10 RX ADMIN — METAXALONE 800 MG: 800 TABLET ORAL at 05:25

## 2019-01-10 RX ADMIN — METHOCARBAMOL 1000 MG: 100 INJECTION INTRAMUSCULAR; INTRAVENOUS at 09:41

## 2019-01-10 RX ADMIN — SENNOSIDES AND DOCUSATE SODIUM 1 TABLET: 8.6; 5 TABLET ORAL at 20:40

## 2019-01-10 RX ADMIN — ACETAMINOPHEN 650 MG: 325 TABLET, FILM COATED ORAL at 23:26

## 2019-01-10 RX ADMIN — SODIUM CHLORIDE 500 MG: 9 INJECTION, SOLUTION INTRAVENOUS at 02:10

## 2019-01-10 RX ADMIN — SODIUM CHLORIDE 500 MG: 9 INJECTION, SOLUTION INTRAVENOUS at 12:12

## 2019-01-10 RX ADMIN — METHOCARBAMOL TABLETS 750 MG: 750 TABLET, COATED ORAL at 18:29

## 2019-01-10 RX ADMIN — MORPHINE SULFATE 4 MG: 4 INJECTION INTRAVENOUS at 04:05

## 2019-01-10 RX ADMIN — ACETAMINOPHEN 650 MG: 325 TABLET, FILM COATED ORAL at 18:29

## 2019-01-10 ASSESSMENT — PAIN SCALES - GENERAL
PAINLEVEL_OUTOF10: 6
PAINLEVEL_OUTOF10: 3
PAINLEVEL_OUTOF10: 9
PAINLEVEL_OUTOF10: 7
PAINLEVEL_OUTOF10: 9
PAINLEVEL_OUTOF10: 7
PAINLEVEL_OUTOF10: 8
PAINLEVEL_OUTOF10: 8
PAINLEVEL_OUTOF10: 7
PAINLEVEL_OUTOF10: 6
PAINLEVEL_OUTOF10: 6
PAINLEVEL_OUTOF10: 8

## 2019-01-10 ASSESSMENT — PATIENT HEALTH QUESTIONNAIRE - PHQ9
2. FEELING DOWN, DEPRESSED, IRRITABLE, OR HOPELESS: NOT AT ALL
SUM OF ALL RESPONSES TO PHQ9 QUESTIONS 1 AND 2: 0
1. LITTLE INTEREST OR PLEASURE IN DOING THINGS: NOT AT ALL

## 2019-01-10 ASSESSMENT — ENCOUNTER SYMPTOMS
TINGLING: 1
NECK PAIN: 1
CHILLS: 0
FOCAL WEAKNESS: 0
VOMITING: 0
NAUSEA: 0
DOUBLE VISION: 0
ABDOMINAL PAIN: 0
ROS GI COMMENTS: BM 1/9
SHORTNESS OF BREATH: 0
PALPITATIONS: 0
FEVER: 0
MYALGIAS: 1

## 2019-01-10 NOTE — PROGRESS NOTES
"Blood pressure 120/75, pulse 66, temperature 36.8 °C (98.3 °F), temperature source Temporal, resp. rate 18, height 1.727 m (5' 8\"), weight 56.7 kg (125 lb), SpO2 96 %.    Patient c.o break through pain in his mid-epigastric chest region.   Pepcid now  Start skelaxin for musculoskeletal pain   EKG now  GI cocktail if nectar thick   Change Fentanyl to morphine  Will follow up and report to day APN  "
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
## 2019-01-10 NOTE — PROGRESS NOTES
"Education provided four times this morning on use of call light. Pt unable to remember to use call light and is impulsively getting out of bed on multiple occassions. Ambulated with pt around floor. Pt kicked into the air twice and laughed to demonstrate how steady he is, stating \"don't I have great balance. I can walk by myself.\" Asked pt to refrain from dancing and kicking to maintain his safety and continued to assist him in ambulating. Pt expressed zero pain throughout walk. Upon entering pt room, pt states \"Ow! My neck hurts. I am in so much pain. I need medicine.\" Positioned pt in bed and he rolled onto his right side and fell asleep. Will notify BRAULIO Baron and continue to monitor pt for impulsivity and uncontrollable pain.  "

## 2019-01-10 NOTE — CARE PLAN
Problem: Pain Management  Goal: Pain level will decrease to patient's comfort goal    Intervention: Follow pain managment plan developed in collaboration with patient and Interdisciplinary Team  Medicated per MAR      Problem: Psychosocial Needs:  Goal: Level of anxiety will decrease    Intervention: Identify and develop with patient strategies to cope with anxiety triggers  Episodes of anxiety, support provided.

## 2019-01-10 NOTE — FACE TO FACE
Attempted to talk with patient, but patient not responsive. Left patient a Bible and I will return at another time. Finally able to talk to patient and pray with him. He was in much pain.

## 2019-01-10 NOTE — PROGRESS NOTES
A/Ox4, pt medicated as needed for pain. Wears c-collar at all times. Up to bathroom with standby assist. Pt emotional at this time, provided with 80's rock music per request to help him calm down.Pt resting quietly in bed with family visiting at bedside. Fall and safety precautions in place, bed alarm and frame alarm on.    Per day RN, pt got out of bed abruptly and sat back down when he felt a pop feeling in his neck with increased pain. Pain has subsided since and neuro assessment unchanged. Notified Virginie POND of event, will continue to monitor.

## 2019-01-10 NOTE — FLOWSHEET NOTE
01/10/19 1040   Events/Summary/Plan   Events/Summary/Plan Pt alert and on RA. VSS. Pt demo good technique and understanding w/ IS/ Is to nursing per protocol. RN aware.    Incentive Spirometry Group   Incentive Spirometry Instruction Yes   Breathing Exercises Yes   Incentive Spirometer Volume 1750 mL

## 2019-01-10 NOTE — CARE PLAN
Problem: Safety  Goal: Will remain free from injury  Outcome: PROGRESSING AS EXPECTED  Fall and safety precautions in place.    Problem: Pain Management  Goal: Pain level will decrease to patient's comfort goal  Outcome: PROGRESSING AS EXPECTED  Pt medicated as needed for neck pain. Upon reassessment pt resting quietly in bed. Will continue to monitor and medicate as needed.    Problem: Mobility  Goal: Risk for activity intolerance will decrease  Outcome: PROGRESSING AS EXPECTED  Pt up with standby assist to bathroom.
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
## 2019-01-11 ENCOUNTER — APPOINTMENT (OUTPATIENT)
Dept: RADIOLOGY | Facility: MEDICAL CENTER | Age: 44
DRG: 963 | End: 2019-01-11
Attending: NURSE PRACTITIONER
Payer: OTHER MISCELLANEOUS

## 2019-01-11 ENCOUNTER — HOSPITAL ENCOUNTER (INPATIENT)
Facility: REHABILITATION | Age: 44
End: 2019-01-11
Attending: PHYSICAL MEDICINE & REHABILITATION | Admitting: PHYSICAL MEDICINE & REHABILITATION
Payer: OTHER MISCELLANEOUS

## 2019-01-11 PROBLEM — E83.39 HYPOPHOSPHATEMIA: Status: RESOLVED | Noted: 2019-01-07 | Resolved: 2019-01-11

## 2019-01-11 LAB
ANION GAP SERPL CALC-SCNC: 9 MMOL/L (ref 0–11.9)
BASOPHILS # BLD AUTO: 0.2 % (ref 0–1.8)
BASOPHILS # BLD: 0.02 K/UL (ref 0–0.12)
BUN SERPL-MCNC: 22 MG/DL (ref 8–22)
CALCIUM SERPL-MCNC: 8.9 MG/DL (ref 8.5–10.5)
CHLORIDE SERPL-SCNC: 101 MMOL/L (ref 96–112)
CO2 SERPL-SCNC: 26 MMOL/L (ref 20–33)
CREAT SERPL-MCNC: 0.75 MG/DL (ref 0.5–1.4)
EOSINOPHIL # BLD AUTO: 0.09 K/UL (ref 0–0.51)
EOSINOPHIL NFR BLD: 0.9 % (ref 0–6.9)
ERYTHROCYTE [DISTWIDTH] IN BLOOD BY AUTOMATED COUNT: 39.7 FL (ref 35.9–50)
GLUCOSE SERPL-MCNC: 105 MG/DL (ref 65–99)
HCT VFR BLD AUTO: 36.9 % (ref 42–52)
HGB BLD-MCNC: 12.4 G/DL (ref 14–18)
IMM GRANULOCYTES # BLD AUTO: 0.06 K/UL (ref 0–0.11)
IMM GRANULOCYTES NFR BLD AUTO: 0.6 % (ref 0–0.9)
LYMPHOCYTES # BLD AUTO: 2.81 K/UL (ref 1–4.8)
LYMPHOCYTES NFR BLD: 27 % (ref 22–41)
MCH RBC QN AUTO: 29.5 PG (ref 27–33)
MCHC RBC AUTO-ENTMCNC: 33.6 G/DL (ref 33.7–35.3)
MCV RBC AUTO: 87.6 FL (ref 81.4–97.8)
MONOCYTES # BLD AUTO: 0.96 K/UL (ref 0–0.85)
MONOCYTES NFR BLD AUTO: 9.2 % (ref 0–13.4)
NEUTROPHILS # BLD AUTO: 6.47 K/UL (ref 1.82–7.42)
NEUTROPHILS NFR BLD: 62.1 % (ref 44–72)
NRBC # BLD AUTO: 0 K/UL
NRBC BLD-RTO: 0 /100 WBC
PHOSPHATE SERPL-MCNC: 2.8 MG/DL (ref 2.5–4.5)
PLATELET # BLD AUTO: 500 K/UL (ref 164–446)
PMV BLD AUTO: 8.5 FL (ref 9–12.9)
POTASSIUM SERPL-SCNC: 4.2 MMOL/L (ref 3.6–5.5)
RBC # BLD AUTO: 4.21 M/UL (ref 4.7–6.1)
SODIUM SERPL-SCNC: 136 MMOL/L (ref 135–145)
WBC # BLD AUTO: 10.4 K/UL (ref 4.8–10.8)

## 2019-01-11 PROCEDURE — 700111 HCHG RX REV CODE 636 W/ 250 OVERRIDE (IP): Performed by: NURSE PRACTITIONER

## 2019-01-11 PROCEDURE — 700102 HCHG RX REV CODE 250 W/ 637 OVERRIDE(OP): Performed by: NURSE PRACTITIONER

## 2019-01-11 PROCEDURE — 92523 SPEECH SOUND LANG COMPREHEN: CPT

## 2019-01-11 PROCEDURE — 700101 HCHG RX REV CODE 250: Performed by: NURSE PRACTITIONER

## 2019-01-11 PROCEDURE — 97535 SELF CARE MNGMENT TRAINING: CPT

## 2019-01-11 PROCEDURE — 700111 HCHG RX REV CODE 636 W/ 250 OVERRIDE (IP): Performed by: SURGERY

## 2019-01-11 PROCEDURE — A9270 NON-COVERED ITEM OR SERVICE: HCPCS | Performed by: NURSE PRACTITIONER

## 2019-01-11 PROCEDURE — 36415 COLL VENOUS BLD VENIPUNCTURE: CPT

## 2019-01-11 PROCEDURE — 85025 COMPLETE CBC W/AUTO DIFF WBC: CPT

## 2019-01-11 PROCEDURE — 84100 ASSAY OF PHOSPHORUS: CPT

## 2019-01-11 PROCEDURE — 97530 THERAPEUTIC ACTIVITIES: CPT

## 2019-01-11 PROCEDURE — 71045 X-RAY EXAM CHEST 1 VIEW: CPT

## 2019-01-11 PROCEDURE — 97116 GAIT TRAINING THERAPY: CPT

## 2019-01-11 PROCEDURE — 770006 HCHG ROOM/CARE - MED/SURG/GYN SEMI*

## 2019-01-11 PROCEDURE — 80048 BASIC METABOLIC PNL TOTAL CA: CPT

## 2019-01-11 PROCEDURE — 700112 HCHG RX REV CODE 229: Performed by: NURSE PRACTITIONER

## 2019-01-11 RX ORDER — MORPHINE SULFATE 4 MG/ML
2 INJECTION, SOLUTION INTRAMUSCULAR; INTRAVENOUS EVERY 6 HOURS PRN
Status: DISCONTINUED | OUTPATIENT
Start: 2019-01-11 | End: 2019-01-13

## 2019-01-11 RX ADMIN — LEVETIRACETAM 500 MG: 500 TABLET ORAL at 17:05

## 2019-01-11 RX ADMIN — ENOXAPARIN SODIUM 30 MG: 100 INJECTION SUBCUTANEOUS at 05:09

## 2019-01-11 RX ADMIN — GABAPENTIN 100 MG: 100 CAPSULE ORAL at 17:05

## 2019-01-11 RX ADMIN — DOCUSATE SODIUM 100 MG: 100 CAPSULE, LIQUID FILLED ORAL at 05:09

## 2019-01-11 RX ADMIN — ACETAMINOPHEN 650 MG: 325 TABLET, FILM COATED ORAL at 11:49

## 2019-01-11 RX ADMIN — LIDOCAINE 1 PATCH: 50 PATCH TOPICAL at 11:49

## 2019-01-11 RX ADMIN — OXYCODONE HYDROCHLORIDE 10 MG: 10 TABLET ORAL at 18:24

## 2019-01-11 RX ADMIN — METHOCARBAMOL TABLETS 750 MG: 750 TABLET, COATED ORAL at 17:05

## 2019-01-11 RX ADMIN — ACETAMINOPHEN 650 MG: 325 TABLET, FILM COATED ORAL at 22:33

## 2019-01-11 RX ADMIN — OXYCODONE HYDROCHLORIDE 10 MG: 10 TABLET ORAL at 10:06

## 2019-01-11 RX ADMIN — MORPHINE SULFATE 2 MG: 4 INJECTION INTRAVENOUS at 15:56

## 2019-01-11 RX ADMIN — Medication: at 11:54

## 2019-01-11 RX ADMIN — OXYCODONE HYDROCHLORIDE 10 MG: 10 TABLET ORAL at 14:30

## 2019-01-11 RX ADMIN — Medication: at 17:06

## 2019-01-11 RX ADMIN — METHOCARBAMOL TABLETS 750 MG: 750 TABLET, COATED ORAL at 05:09

## 2019-01-11 RX ADMIN — MORPHINE SULFATE 4 MG: 4 INJECTION INTRAVENOUS at 02:34

## 2019-01-11 RX ADMIN — GABAPENTIN 100 MG: 100 CAPSULE ORAL at 11:49

## 2019-01-11 RX ADMIN — FAMOTIDINE 20 MG: 20 TABLET ORAL at 17:05

## 2019-01-11 RX ADMIN — OXYCODONE HYDROCHLORIDE 10 MG: 10 TABLET ORAL at 05:07

## 2019-01-11 RX ADMIN — GABAPENTIN 100 MG: 100 CAPSULE ORAL at 05:08

## 2019-01-11 RX ADMIN — LEVETIRACETAM 500 MG: 500 TABLET ORAL at 05:09

## 2019-01-11 RX ADMIN — Medication 1 EACH: at 05:09

## 2019-01-11 RX ADMIN — POLYETHYLENE GLYCOL 3350 1 PACKET: 17 POWDER, FOR SOLUTION ORAL at 17:05

## 2019-01-11 RX ADMIN — DOCUSATE SODIUM 100 MG: 100 CAPSULE, LIQUID FILLED ORAL at 17:05

## 2019-01-11 RX ADMIN — METHOCARBAMOL TABLETS 750 MG: 750 TABLET, COATED ORAL at 11:49

## 2019-01-11 RX ADMIN — MORPHINE SULFATE 4 MG: 4 INJECTION INTRAVENOUS at 08:36

## 2019-01-11 RX ADMIN — SENNOSIDES AND DOCUSATE SODIUM 1 TABLET: 8.6; 5 TABLET ORAL at 20:18

## 2019-01-11 RX ADMIN — ACETAMINOPHEN 650 MG: 325 TABLET, FILM COATED ORAL at 17:05

## 2019-01-11 RX ADMIN — OXYCODONE HYDROCHLORIDE 10 MG: 10 TABLET ORAL at 22:26

## 2019-01-11 RX ADMIN — FAMOTIDINE 20 MG: 20 TABLET ORAL at 05:08

## 2019-01-11 RX ADMIN — ENOXAPARIN SODIUM 30 MG: 100 INJECTION SUBCUTANEOUS at 17:05

## 2019-01-11 RX ADMIN — ACETAMINOPHEN 650 MG: 325 TABLET, FILM COATED ORAL at 05:08

## 2019-01-11 ASSESSMENT — GAIT ASSESSMENTS
ASSISTIVE DEVICE: FRONT WHEEL WALKER
DISTANCE (FEET): 150
GAIT LEVEL OF ASSIST: MINIMAL ASSIST

## 2019-01-11 ASSESSMENT — COGNITIVE AND FUNCTIONAL STATUS - GENERAL
MOBILITY SCORE: 17
STANDING UP FROM CHAIR USING ARMS: A LITTLE
MOVING FROM LYING ON BACK TO SITTING ON SIDE OF FLAT BED: A LITTLE
WALKING IN HOSPITAL ROOM: A LITTLE
CLIMB 3 TO 5 STEPS WITH RAILING: A LITTLE
DRESSING REGULAR LOWER BODY CLOTHING: A LOT
HELP NEEDED FOR BATHING: A LOT
PERSONAL GROOMING: A LITTLE
SUGGESTED CMS G CODE MODIFIER MOBILITY: CK
TOILETING: A LOT
SUGGESTED CMS G CODE MODIFIER DAILY ACTIVITY: CK
MOVING TO AND FROM BED TO CHAIR: A LITTLE
TURNING FROM BACK TO SIDE WHILE IN FLAT BAD: A LOT
DAILY ACTIVITIY SCORE: 16
DRESSING REGULAR UPPER BODY CLOTHING: A LITTLE

## 2019-01-11 ASSESSMENT — ENCOUNTER SYMPTOMS
SHORTNESS OF BREATH: 0
NECK PAIN: 1
FEVER: 0
VOMITING: 0
ABDOMINAL PAIN: 0
NAUSEA: 0
FOCAL WEAKNESS: 0
CHILLS: 0
ROS GI COMMENTS: BM 1/10
PALPITATIONS: 0
DOUBLE VISION: 0
MYALGIAS: 1

## 2019-01-11 ASSESSMENT — PAIN SCALES - GENERAL
PAINLEVEL_OUTOF10: 9
PAINLEVEL_OUTOF10: 7
PAINLEVEL_OUTOF10: 8
PAINLEVEL_OUTOF10: 8
PAINLEVEL_OUTOF10: 7
PAINLEVEL_OUTOF10: 6
PAINLEVEL_OUTOF10: 9
PAINLEVEL_OUTOF10: 8
PAINLEVEL_OUTOF10: 7
PAINLEVEL_OUTOF10: 8
PAINLEVEL_OUTOF10: 6
PAINLEVEL_OUTOF10: 10

## 2019-01-11 NOTE — PROGRESS NOTES
Pt sister Meg called x2 this AM. I explained to her that the patient has requested that all communication go through him. I explained to her that I cannot discuss he POC and condition with her but that she could speak with him. She continued to ask me questions. She was then transferred through to the room phone.

## 2019-01-11 NOTE — PROGRESS NOTES
Pt appeared to be chewing while family visiting. Provided education patient is on modified diet due to swelling and impaired swallowing. Verbalized understanding.
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
## 2019-01-11 NOTE — PROGRESS NOTES
Received report from night nurse at the bedside. Assume care of Pt at 0700. Assessment completed Pt A&O X 4. Pt denies numbness and tingling, Pt c/o pain in neck 6/10 says it is aching. Pt is asking for pain medication as soon as it is available.  Assist of one. C-Collar in place at all times. Waffle cushion in use for pillow. Pt in bed, bed in lowest position, call light in place, bed alarm is on, treaded slipper socks on.
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
## 2019-01-11 NOTE — CARE PLAN
Problem: Pain Management  Goal: Pain level will decrease to patient's comfort goal  Outcome: PROGRESSING SLOWER THAN EXPECTED  Pt complaining of pain 6-8/10.   Medicated per mar with morphine and oxycodone. Will continue to work on pain control.     Problem: Mobility  Goal: Risk for activity intolerance will decrease  Outcome: PROGRESSING SLOWER THAN EXPECTED    Intervention: Encourage patient to increase activity level in collaboration with Interdisciplinary Team  Discussed the importance of mobility with the patient to recovery. Pt verbalized understanding.  Pt agreed to work with PT/OT.

## 2019-01-11 NOTE — CARE PLAN
Problem: Pain Management  Goal: Pain level will decrease to patient's comfort goal  Outcome: PROGRESSING SLOWER THAN EXPECTED  Patient reporting generalized pain. PRN pain medication administered per MAR.     Problem: Mobility  Goal: Risk for activity intolerance will decrease  Outcome: PROGRESSING AS EXPECTED  Patient ambulatory up to bathroom with one person assist. Bed alarm in place for safety.

## 2019-01-11 NOTE — PROGRESS NOTES
Patient seen Aox4. Family at bedside.  Plan of care updated. Will continue to work on pain control. Patient verbalizes understanding.  Patient reporting neck/generalized pain rating it a 9 out of 10. PRN pain medication administered with + results.  No complaints of numbness/tingling at this time.  Patient WALSH without difficulty and is ambulatory up to bathroom with one person assist. Re educated patient on how to call for assistance.   C-collar in place.   All needs addressed at this time  Call light and personal belongings within reach, bed locked and in lowest position, bed alarm on and hourly rounding in place.
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
## 2019-01-11 NOTE — PROGRESS NOTES
· 2 RN skin check complete with BRAULIO Zarco.  · Devices in place C-collar.  · Skin assessed under devices collar intact.  · Confirmed pressure ulcers found on N/A.  · New potential pressure ulcers noted on N/A. Wound consult placed and wound reported.  · The patient has scatter abrasions throughout body in various stages of healing. Lidocaine patch in place under collar so no mepilex is in use at this time.

## 2019-01-11 NOTE — THERAPY
"Speech Language Therapy Evaluation completed to address cognition  Functional Status:  Nursing noting variability in pt's communication with some repetitive statements. Pt completed parts of NCSE and non-standard cognitive linguistic eval. Pt presenting with slight to min deficits with STM recall and clock organization. 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 Pt stating day of week \"Monday\" with accurate answers for all other temporal orientation. Pt accurate with immed memory repeating 3-6 sequences of numbers 8/8. Pt wears reading glasses and requires larger print for accurate oral reading at the phrase level. Pt requires time and visual cues to solve simple addition and subtraction with pt stating normally he is \"good with numbers.\" Pt with mildly disorganized clock drawing with the number \"10\" written twice. When pt cued to find and error in his clock, he was unable to recognize the error or possible perseveration. Pt became tearful when SLP provided simple educ regarding slight to min cognitive linguistic deficits with plan for tx one time weekly at the acute level of care.  Recommendations:  Pt/family educ with written and verbal information re MTBI and post concussion.   Plan of Care: Will benefit from Speech Therapy 5 times per week, dysphagia and 1 time weekly for MTBI  Post-Acute Therapy: dysphagia and cognition. Thanks, Zay    See \"Rehab Therapy-Acute\" Patient Summary Report for complete documentation.   "
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
## 2019-01-12 ENCOUNTER — APPOINTMENT (OUTPATIENT)
Dept: RADIOLOGY | Facility: MEDICAL CENTER | Age: 44
DRG: 963 | End: 2019-01-12
Attending: NURSE PRACTITIONER
Payer: OTHER MISCELLANEOUS

## 2019-01-12 ENCOUNTER — APPOINTMENT (OUTPATIENT)
Dept: RADIOLOGY | Facility: MEDICAL CENTER | Age: 44
DRG: 963 | End: 2019-01-12
Attending: SURGERY
Payer: OTHER MISCELLANEOUS

## 2019-01-12 PROCEDURE — 700102 HCHG RX REV CODE 250 W/ 637 OVERRIDE(OP): Performed by: NURSE PRACTITIONER

## 2019-01-12 PROCEDURE — 93005 ELECTROCARDIOGRAM TRACING: CPT | Performed by: SURGERY

## 2019-01-12 PROCEDURE — 700111 HCHG RX REV CODE 636 W/ 250 OVERRIDE (IP): Performed by: SURGERY

## 2019-01-12 PROCEDURE — 700101 HCHG RX REV CODE 250: Performed by: NURSE PRACTITIONER

## 2019-01-12 PROCEDURE — A9270 NON-COVERED ITEM OR SERVICE: HCPCS | Performed by: NURSE PRACTITIONER

## 2019-01-12 PROCEDURE — 700112 HCHG RX REV CODE 229: Performed by: NURSE PRACTITIONER

## 2019-01-12 PROCEDURE — 770006 HCHG ROOM/CARE - MED/SURG/GYN SEMI*

## 2019-01-12 PROCEDURE — 93010 ELECTROCARDIOGRAM REPORT: CPT | Performed by: INTERNAL MEDICINE

## 2019-01-12 PROCEDURE — 72040 X-RAY EXAM NECK SPINE 2-3 VW: CPT

## 2019-01-12 PROCEDURE — 700111 HCHG RX REV CODE 636 W/ 250 OVERRIDE (IP): Performed by: NURSE PRACTITIONER

## 2019-01-12 RX ORDER — MORPHINE SULFATE 15 MG/1
15 TABLET, FILM COATED, EXTENDED RELEASE ORAL EVERY 12 HOURS
Status: DISCONTINUED | OUTPATIENT
Start: 2019-01-12 | End: 2019-01-21

## 2019-01-12 RX ORDER — GABAPENTIN 300 MG/1
300 CAPSULE ORAL 3 TIMES DAILY
Status: DISCONTINUED | OUTPATIENT
Start: 2019-01-12 | End: 2019-01-24

## 2019-01-12 RX ORDER — FAMOTIDINE 20 MG/1
20 TABLET, FILM COATED ORAL ONCE
Status: COMPLETED | OUTPATIENT
Start: 2019-01-13 | End: 2019-01-13

## 2019-01-12 RX ORDER — METAXALONE 800 MG/1
800 TABLET ORAL 3 TIMES DAILY
Status: DISCONTINUED | OUTPATIENT
Start: 2019-01-12 | End: 2019-01-22

## 2019-01-12 RX ADMIN — GABAPENTIN 300 MG: 300 CAPSULE ORAL at 17:25

## 2019-01-12 RX ADMIN — OXYCODONE HYDROCHLORIDE 10 MG: 10 TABLET ORAL at 08:35

## 2019-01-12 RX ADMIN — LIDOCAINE HYDROCHLORIDE 20 ML: 20 SOLUTION OROPHARYNGEAL at 23:45

## 2019-01-12 RX ADMIN — SENNOSIDES AND DOCUSATE SODIUM 1 TABLET: 8.6; 5 TABLET ORAL at 17:25

## 2019-01-12 RX ADMIN — LEVETIRACETAM 500 MG: 500 TABLET ORAL at 17:25

## 2019-01-12 RX ADMIN — OXYCODONE HYDROCHLORIDE 10 MG: 10 TABLET ORAL at 02:40

## 2019-01-12 RX ADMIN — METHOCARBAMOL TABLETS 750 MG: 750 TABLET, COATED ORAL at 11:39

## 2019-01-12 RX ADMIN — MORPHINE SULFATE 2 MG: 4 INJECTION INTRAVENOUS at 20:16

## 2019-01-12 RX ADMIN — POLYETHYLENE GLYCOL 3350 1 PACKET: 17 POWDER, FOR SOLUTION ORAL at 05:26

## 2019-01-12 RX ADMIN — METAXALONE 800 MG: 800 TABLET ORAL at 23:47

## 2019-01-12 RX ADMIN — ACETAMINOPHEN 650 MG: 325 TABLET, FILM COATED ORAL at 17:25

## 2019-01-12 RX ADMIN — DOCUSATE SODIUM 100 MG: 100 CAPSULE, LIQUID FILLED ORAL at 05:24

## 2019-01-12 RX ADMIN — LIDOCAINE 1 PATCH: 50 PATCH TOPICAL at 11:39

## 2019-01-12 RX ADMIN — Medication: at 11:40

## 2019-01-12 RX ADMIN — ENOXAPARIN SODIUM 30 MG: 100 INJECTION SUBCUTANEOUS at 17:28

## 2019-01-12 RX ADMIN — MORPHINE SULFATE 2 MG: 4 INJECTION INTRAVENOUS at 06:13

## 2019-01-12 RX ADMIN — METHOCARBAMOL TABLETS 750 MG: 750 TABLET, COATED ORAL at 05:24

## 2019-01-12 RX ADMIN — GABAPENTIN 100 MG: 100 CAPSULE ORAL at 05:24

## 2019-01-12 RX ADMIN — Medication 1 EACH: at 05:24

## 2019-01-12 RX ADMIN — MORPHINE SULFATE 15 MG: 15 TABLET, EXTENDED RELEASE ORAL at 17:25

## 2019-01-12 RX ADMIN — MORPHINE SULFATE 15 MG: 15 TABLET, EXTENDED RELEASE ORAL at 11:39

## 2019-01-12 RX ADMIN — OXYCODONE HYDROCHLORIDE 10 MG: 10 TABLET ORAL at 17:26

## 2019-01-12 RX ADMIN — DOCUSATE SODIUM 100 MG: 100 CAPSULE, LIQUID FILLED ORAL at 17:25

## 2019-01-12 RX ADMIN — OXYCODONE HYDROCHLORIDE 10 MG: 10 TABLET ORAL at 22:09

## 2019-01-12 RX ADMIN — ACETAMINOPHEN 650 MG: 325 TABLET, FILM COATED ORAL at 11:38

## 2019-01-12 RX ADMIN — ENOXAPARIN SODIUM 30 MG: 100 INJECTION SUBCUTANEOUS at 05:24

## 2019-01-12 RX ADMIN — LEVETIRACETAM 500 MG: 500 TABLET ORAL at 05:24

## 2019-01-12 RX ADMIN — MORPHINE SULFATE 2 MG: 4 INJECTION INTRAVENOUS at 13:36

## 2019-01-12 RX ADMIN — ACETAMINOPHEN 650 MG: 325 TABLET, FILM COATED ORAL at 05:23

## 2019-01-12 RX ADMIN — FAMOTIDINE 20 MG: 20 TABLET ORAL at 05:24

## 2019-01-12 RX ADMIN — MORPHINE SULFATE 2 MG: 4 INJECTION INTRAVENOUS at 00:18

## 2019-01-12 RX ADMIN — GABAPENTIN 300 MG: 300 CAPSULE ORAL at 11:39

## 2019-01-12 RX ADMIN — OXYCODONE HYDROCHLORIDE 10 MG: 10 TABLET ORAL at 13:36

## 2019-01-12 ASSESSMENT — PAIN SCALES - GENERAL
PAINLEVEL_OUTOF10: 8
PAINLEVEL_OUTOF10: 7
PAINLEVEL_OUTOF10: 7
PAINLEVEL_OUTOF10: 8
PAINLEVEL_OUTOF10: 9
PAINLEVEL_OUTOF10: 7
PAINLEVEL_OUTOF10: 8
PAINLEVEL_OUTOF10: 7
PAINLEVEL_OUTOF10: 9
PAINLEVEL_OUTOF10: 8
PAINLEVEL_OUTOF10: 9
PAINLEVEL_OUTOF10: 8

## 2019-01-12 ASSESSMENT — ENCOUNTER SYMPTOMS
CHILLS: 0
ABDOMINAL PAIN: 0
NAUSEA: 0
SHORTNESS OF BREATH: 0
NECK PAIN: 1
VOMITING: 0
FEVER: 0
ROS GI COMMENTS: BM 1/11
MYALGIAS: 1
PALPITATIONS: 0
DOUBLE VISION: 0
FOCAL WEAKNESS: 0

## 2019-01-12 NOTE — CARE PLAN
Problem: Pain Management  Goal: Pain level will decrease to patient's comfort goal  Outcome: PROGRESSING SLOWER THAN EXPECTED  Oxycodone and morphine in use for pain control. Will provide to pt as available.     Problem: Mobility  Goal: Risk for activity intolerance will decrease  Pt educated on the importance of mobility.   Pt will mobilize up to chair for meals and ambulate in the hallway.

## 2019-01-12 NOTE — PROGRESS NOTES
Patient seen Aox4.   Plan of care updated. Will continue to work on pain control. Patient verbalizes understanding.  Patient reporting neck/generalized pain rating it an 8 out of 10. Patient medicated for pain per MAR.   No complaints of numbness/tingling at this time.  Patient WALSH without difficulty and is ambulatory up to bathroom with one person assist. Re educated patient on how to call for assistance.   C-collar in place at all times.  All needs addressed at this time  Call light and personal belongings within reach, bed locked and in lowest position, bed alarm on and hourly rounding in place

## 2019-01-12 NOTE — PROGRESS NOTES
Received report from night nurse at the bedside. Assume care of Pt at 0700. Assessment completed Pt A&O X 4. Pt denies numbness and tingling, Pt gave a thumbs up when asked if his pain was okay, Assist of one with fww. C-collar in place at all times. Pt in bed, bed in lowest position, call light in place, bed alarm is on, treaded slipper socks on.
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
## 2019-01-12 NOTE — PROGRESS NOTES
· 2 RN skin check complete with BRAULIO Quiros.  · Devices in place C-Collar.  · Skin assessed under device is intact.  · Confirmed pressure ulcers found on N/A.  · Pt turns self from side to side  scrapes noted on head, no other areas of breakdown noted.

## 2019-01-12 NOTE — PROGRESS NOTES
[place] collar pads were delivered to patient.  If any further assistance needed, please call extension [phone] or place order for Ortho Technician assistance as a communication order in [place].
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
## 2019-01-12 NOTE — DISCHARGE PLANNING
Received Choice form at 6300  Agency/Facility Name: [facility], [facility], [facility]  Referral sent per Choice form @ 6725

## 2019-01-13 ENCOUNTER — APPOINTMENT (OUTPATIENT)
Dept: RADIOLOGY | Facility: MEDICAL CENTER | Age: 44
DRG: 963 | End: 2019-01-13
Attending: NURSE PRACTITIONER
Payer: OTHER MISCELLANEOUS

## 2019-01-13 LAB
ALBUMIN SERPL BCP-MCNC: 3.2 G/DL (ref 3.2–4.9)
ALBUMIN/GLOB SERPL: 0.9 G/DL
ALP SERPL-CCNC: 60 U/L (ref 30–99)
ALT SERPL-CCNC: 57 U/L (ref 2–50)
ANION GAP SERPL CALC-SCNC: 9 MMOL/L (ref 0–11.9)
AST SERPL-CCNC: 21 U/L (ref 12–45)
BASOPHILS # BLD AUTO: 0.6 % (ref 0–1.8)
BASOPHILS # BLD: 0.08 K/UL (ref 0–0.12)
BILIRUB SERPL-MCNC: 0.2 MG/DL (ref 0.1–1.5)
BUN SERPL-MCNC: 23 MG/DL (ref 8–22)
CALCIUM SERPL-MCNC: 9.1 MG/DL (ref 8.5–10.5)
CHLORIDE SERPL-SCNC: 101 MMOL/L (ref 96–112)
CO2 SERPL-SCNC: 25 MMOL/L (ref 20–33)
CREAT SERPL-MCNC: 0.84 MG/DL (ref 0.5–1.4)
EKG IMPRESSION: NORMAL
EOSINOPHIL # BLD AUTO: 0.29 K/UL (ref 0–0.51)
EOSINOPHIL NFR BLD: 2.3 % (ref 0–6.9)
ERYTHROCYTE [DISTWIDTH] IN BLOOD BY AUTOMATED COUNT: 40.7 FL (ref 35.9–50)
GLOBULIN SER CALC-MCNC: 3.5 G/DL (ref 1.9–3.5)
GLUCOSE SERPL-MCNC: 96 MG/DL (ref 65–99)
HCT VFR BLD AUTO: 39.9 % (ref 42–52)
HGB BLD-MCNC: 13.3 G/DL (ref 14–18)
IMM GRANULOCYTES # BLD AUTO: 0.41 K/UL (ref 0–0.11)
IMM GRANULOCYTES NFR BLD AUTO: 3.3 % (ref 0–0.9)
LIPASE SERPL-CCNC: 17 U/L (ref 11–82)
LYMPHOCYTES # BLD AUTO: 2.96 K/UL (ref 1–4.8)
LYMPHOCYTES NFR BLD: 23.7 % (ref 22–41)
MCH RBC QN AUTO: 29.4 PG (ref 27–33)
MCHC RBC AUTO-ENTMCNC: 33.3 G/DL (ref 33.7–35.3)
MCV RBC AUTO: 88.1 FL (ref 81.4–97.8)
MONOCYTES # BLD AUTO: 1.04 K/UL (ref 0–0.85)
MONOCYTES NFR BLD AUTO: 8.3 % (ref 0–13.4)
NEUTROPHILS # BLD AUTO: 7.7 K/UL (ref 1.82–7.42)
NEUTROPHILS NFR BLD: 61.8 % (ref 44–72)
NRBC # BLD AUTO: 0 K/UL
NRBC BLD-RTO: 0 /100 WBC
PLATELET # BLD AUTO: 500 K/UL (ref 164–446)
PMV BLD AUTO: 8.5 FL (ref 9–12.9)
POTASSIUM SERPL-SCNC: 4.8 MMOL/L (ref 3.6–5.5)
PROT SERPL-MCNC: 6.7 G/DL (ref 6–8.2)
RBC # BLD AUTO: 4.53 M/UL (ref 4.7–6.1)
SODIUM SERPL-SCNC: 135 MMOL/L (ref 135–145)
TROPONIN I SERPL-MCNC: <0.01 NG/ML (ref 0–0.04)
WBC # BLD AUTO: 12.5 K/UL (ref 4.8–10.8)

## 2019-01-13 PROCEDURE — 93005 ELECTROCARDIOGRAM TRACING: CPT | Performed by: NURSE PRACTITIONER

## 2019-01-13 PROCEDURE — 700101 HCHG RX REV CODE 250: Performed by: NURSE PRACTITIONER

## 2019-01-13 PROCEDURE — 80053 COMPREHEN METABOLIC PANEL: CPT

## 2019-01-13 PROCEDURE — 36415 COLL VENOUS BLD VENIPUNCTURE: CPT

## 2019-01-13 PROCEDURE — 700112 HCHG RX REV CODE 229: Performed by: NURSE PRACTITIONER

## 2019-01-13 PROCEDURE — 71045 X-RAY EXAM CHEST 1 VIEW: CPT

## 2019-01-13 PROCEDURE — A9270 NON-COVERED ITEM OR SERVICE: HCPCS | Performed by: NURSE PRACTITIONER

## 2019-01-13 PROCEDURE — 700102 HCHG RX REV CODE 250 W/ 637 OVERRIDE(OP): Performed by: NURSE PRACTITIONER

## 2019-01-13 PROCEDURE — A9270 NON-COVERED ITEM OR SERVICE: HCPCS | Performed by: SURGERY

## 2019-01-13 PROCEDURE — 83690 ASSAY OF LIPASE: CPT

## 2019-01-13 PROCEDURE — 85025 COMPLETE CBC W/AUTO DIFF WBC: CPT

## 2019-01-13 PROCEDURE — 700111 HCHG RX REV CODE 636 W/ 250 OVERRIDE (IP): Performed by: NURSE PRACTITIONER

## 2019-01-13 PROCEDURE — 84484 ASSAY OF TROPONIN QUANT: CPT

## 2019-01-13 PROCEDURE — 93010 ELECTROCARDIOGRAM REPORT: CPT | Performed by: INTERNAL MEDICINE

## 2019-01-13 PROCEDURE — 700111 HCHG RX REV CODE 636 W/ 250 OVERRIDE (IP): Performed by: SURGERY

## 2019-01-13 PROCEDURE — 700102 HCHG RX REV CODE 250 W/ 637 OVERRIDE(OP): Performed by: SURGERY

## 2019-01-13 PROCEDURE — 770006 HCHG ROOM/CARE - MED/SURG/GYN SEMI*

## 2019-01-13 RX ORDER — OXYCODONE HYDROCHLORIDE 10 MG/1
10 TABLET ORAL
Status: DISCONTINUED | OUTPATIENT
Start: 2019-01-13 | End: 2019-01-14

## 2019-01-13 RX ORDER — ALPRAZOLAM 0.25 MG/1
0.25 TABLET ORAL 3 TIMES DAILY PRN
Status: DISCONTINUED | OUTPATIENT
Start: 2019-01-13 | End: 2019-01-13

## 2019-01-13 RX ORDER — OXYCODONE HYDROCHLORIDE 5 MG/1
5 TABLET ORAL
Status: DISCONTINUED | OUTPATIENT
Start: 2019-01-13 | End: 2019-01-21

## 2019-01-13 RX ORDER — DIPHENHYDRAMINE HCL 25 MG
25 TABLET ORAL ONCE
Status: COMPLETED | OUTPATIENT
Start: 2019-01-13 | End: 2019-01-13

## 2019-01-13 RX ORDER — CELECOXIB 200 MG/1
200 CAPSULE ORAL 2 TIMES DAILY
Status: DISCONTINUED | OUTPATIENT
Start: 2019-01-13 | End: 2019-01-20

## 2019-01-13 RX ADMIN — OXYCODONE HYDROCHLORIDE 10 MG: 10 TABLET ORAL at 12:31

## 2019-01-13 RX ADMIN — FAMOTIDINE 20 MG: 20 TABLET ORAL at 17:40

## 2019-01-13 RX ADMIN — Medication: at 08:25

## 2019-01-13 RX ADMIN — OXYCODONE HYDROCHLORIDE 10 MG: 10 TABLET ORAL at 07:47

## 2019-01-13 RX ADMIN — Medication: at 17:40

## 2019-01-13 RX ADMIN — ENOXAPARIN SODIUM 30 MG: 100 INJECTION SUBCUTANEOUS at 08:25

## 2019-01-13 RX ADMIN — METAXALONE 800 MG: 800 TABLET ORAL at 11:19

## 2019-01-13 RX ADMIN — GABAPENTIN 300 MG: 300 CAPSULE ORAL at 17:40

## 2019-01-13 RX ADMIN — ACETAMINOPHEN 650 MG: 325 TABLET, FILM COATED ORAL at 17:40

## 2019-01-13 RX ADMIN — METAXALONE 800 MG: 800 TABLET ORAL at 17:40

## 2019-01-13 RX ADMIN — OXYCODONE HYDROCHLORIDE 10 MG: 10 TABLET ORAL at 17:40

## 2019-01-13 RX ADMIN — FAMOTIDINE 20 MG: 20 TABLET ORAL at 08:23

## 2019-01-13 RX ADMIN — POLYETHYLENE GLYCOL 3350 1 PACKET: 17 POWDER, FOR SOLUTION ORAL at 17:48

## 2019-01-13 RX ADMIN — LIDOCAINE 1 PATCH: 50 PATCH TOPICAL at 11:21

## 2019-01-13 RX ADMIN — FAMOTIDINE 20 MG: 20 TABLET ORAL at 03:30

## 2019-01-13 RX ADMIN — MORPHINE SULFATE 2 MG: 4 INJECTION INTRAVENOUS at 07:47

## 2019-01-13 RX ADMIN — ACETAMINOPHEN 650 MG: 325 TABLET, FILM COATED ORAL at 08:23

## 2019-01-13 RX ADMIN — OXYCODONE HYDROCHLORIDE 10 MG: 10 TABLET ORAL at 21:07

## 2019-01-13 RX ADMIN — ACETAMINOPHEN 650 MG: 325 TABLET, FILM COATED ORAL at 11:19

## 2019-01-13 RX ADMIN — DOCUSATE SODIUM 100 MG: 100 CAPSULE, LIQUID FILLED ORAL at 17:40

## 2019-01-13 RX ADMIN — Medication: at 11:22

## 2019-01-13 RX ADMIN — OXYCODONE HYDROCHLORIDE 10 MG: 10 TABLET ORAL at 03:30

## 2019-01-13 RX ADMIN — GABAPENTIN 300 MG: 300 CAPSULE ORAL at 06:20

## 2019-01-13 RX ADMIN — MORPHINE SULFATE 15 MG: 15 TABLET, EXTENDED RELEASE ORAL at 06:19

## 2019-01-13 RX ADMIN — GABAPENTIN 300 MG: 300 CAPSULE ORAL at 11:19

## 2019-01-13 RX ADMIN — CELECOXIB 200 MG: 200 CAPSULE ORAL at 17:40

## 2019-01-13 RX ADMIN — DIPHENHYDRAMINE HCL 25 MG: 25 TABLET ORAL at 21:31

## 2019-01-13 RX ADMIN — ALPRAZOLAM 0.25 MG: 0.25 TABLET ORAL at 12:31

## 2019-01-13 RX ADMIN — ENOXAPARIN SODIUM 30 MG: 100 INJECTION SUBCUTANEOUS at 17:39

## 2019-01-13 RX ADMIN — MORPHINE SULFATE 15 MG: 15 TABLET, EXTENDED RELEASE ORAL at 17:40

## 2019-01-13 ASSESSMENT — ENCOUNTER SYMPTOMS
SHORTNESS OF BREATH: 0
MYALGIAS: 1
CHILLS: 0
PALPITATIONS: 0
DOUBLE VISION: 0
FEVER: 0
ROS GI COMMENTS: BM 1/11
FOCAL WEAKNESS: 0
NAUSEA: 0
VOMITING: 0
NECK PAIN: 1
ABDOMINAL PAIN: 0

## 2019-01-13 ASSESSMENT — PAIN SCALES - GENERAL
PAINLEVEL_OUTOF10: 9
PAINLEVEL_OUTOF10: 0
PAINLEVEL_OUTOF10: 8
PAINLEVEL_OUTOF10: 6
PAINLEVEL_OUTOF10: 8
PAINLEVEL_OUTOF10: 8
PAINLEVEL_OUTOF10: 9
PAINLEVEL_OUTOF10: 8
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
## 2019-01-13 NOTE — CARE PLAN
Problem: Pain Management  Goal: Pain level will decrease to patient's comfort goal  Outcome: PROGRESSING SLOWER THAN EXPECTED  Weaning off of IV morphine.   Providing oxycodone, skelaxin, and MS contin for pain.     Problem: Psychosocial Needs:  Goal: Level of anxiety will decrease  Outcome: PROGRESSING SLOWER THAN EXPECTED  Pt very anxious.   Anxiety medication requested.

## 2019-01-13 NOTE — PROGRESS NOTES
Received report from night nurse at the bedside. Assume care of Pt at 0700. Assessment completed Pt A&O X 4. Pt denies numbness and tingling, Pt c/o pain 6-9/10, Pt was restless and groaning in pain when I entered the room. Morphine and oxycodone provided per mar.  Assist of one stand by.  C-Collar in place Pt in bed, bed in lowest position, call light in place, bed alarm is on, treaded slipper socks on.    Spoke with patient about morning medications and he is agreeable to take them.

## 2019-01-13 NOTE — PROGRESS NOTES
"Blood pressure 129/87, pulse 84, temperature 36.9 °C (98.4 °F), temperature source Temporal, resp. rate (!) 21, height 1.727 m (5' 8\"), weight 57.8 kg (127 lb 6.8 oz), SpO2 95 %.    Called earlier in evening for \"chest pain'.  Noted patient had similar event a nights ago relieved by GI medication. No ST elevation on EKG and patient appears to have missed his evening PO medication.   Reordered medication- patient refused some per nursing  Repeat EKG in am.   Check other sources cbc,cmp,lipase and trop in am draw  Will report to day APRN for follow up  "

## 2019-01-13 NOTE — CODE DOCUMENTATION
"Pt complaining of 10/10 chest and jaw pain. Pt states \"I've had this pain before, but never this bad\".   "

## 2019-01-13 NOTE — CARE PLAN
Problem: Safety  Goal: Will remain free from injury  Outcome: PROGRESSING AS EXPECTED  2 side rails up, c collar on at all times, suction at the bedside, clutter free environment    Problem: Pain Management  Goal: Pain level will decrease to patient's comfort goal  Outcome: PROGRESSING SLOWER THAN EXPECTED
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
## 2019-01-13 NOTE — PROGRESS NOTES
Patient is complaining of pain of the neck. Comfort measures offered and provided. x1 for ambulation. C collar on.

## 2019-01-14 LAB — EKG IMPRESSION: NORMAL

## 2019-01-14 PROCEDURE — 700102 HCHG RX REV CODE 250 W/ 637 OVERRIDE(OP): Performed by: NURSE PRACTITIONER

## 2019-01-14 PROCEDURE — 700101 HCHG RX REV CODE 250: Performed by: NURSE PRACTITIONER

## 2019-01-14 PROCEDURE — 770006 HCHG ROOM/CARE - MED/SURG/GYN SEMI*

## 2019-01-14 PROCEDURE — A9270 NON-COVERED ITEM OR SERVICE: HCPCS | Performed by: STUDENT IN AN ORGANIZED HEALTH CARE EDUCATION/TRAINING PROGRAM

## 2019-01-14 PROCEDURE — 92526 ORAL FUNCTION THERAPY: CPT

## 2019-01-14 PROCEDURE — 99254 IP/OBS CNSLTJ NEW/EST MOD 60: CPT | Performed by: PSYCHIATRY & NEUROLOGY

## 2019-01-14 PROCEDURE — A9270 NON-COVERED ITEM OR SERVICE: HCPCS | Performed by: NURSE PRACTITIONER

## 2019-01-14 PROCEDURE — 700111 HCHG RX REV CODE 636 W/ 250 OVERRIDE (IP): Performed by: NURSE PRACTITIONER

## 2019-01-14 PROCEDURE — G0515 COGNITIVE SKILLS DEVELOPMENT: HCPCS

## 2019-01-14 PROCEDURE — 700112 HCHG RX REV CODE 229: Performed by: NURSE PRACTITIONER

## 2019-01-14 PROCEDURE — 92523 SPEECH SOUND LANG COMPREHEN: CPT

## 2019-01-14 PROCEDURE — 700111 HCHG RX REV CODE 636 W/ 250 OVERRIDE (IP): Performed by: SURGERY

## 2019-01-14 PROCEDURE — 700102 HCHG RX REV CODE 250 W/ 637 OVERRIDE(OP): Performed by: STUDENT IN AN ORGANIZED HEALTH CARE EDUCATION/TRAINING PROGRAM

## 2019-01-14 RX ORDER — NICOTINE 21 MG/24HR
21 PATCH, TRANSDERMAL 24 HOURS TRANSDERMAL
Status: DISCONTINUED | OUTPATIENT
Start: 2019-01-14 | End: 2019-02-06

## 2019-01-14 RX ORDER — ACETAMINOPHEN 325 MG/1
650 TABLET ORAL EVERY 6 HOURS PRN
Status: DISCONTINUED | OUTPATIENT
Start: 2019-01-14 | End: 2019-01-25

## 2019-01-14 RX ORDER — MORPHINE SULFATE 4 MG/ML
2 INJECTION, SOLUTION INTRAMUSCULAR; INTRAVENOUS ONCE
Status: COMPLETED | OUTPATIENT
Start: 2019-01-14 | End: 2019-01-14

## 2019-01-14 RX ORDER — ARIPIPRAZOLE 10 MG/1
5 TABLET ORAL DAILY
Status: DISCONTINUED | OUTPATIENT
Start: 2019-01-15 | End: 2019-01-22

## 2019-01-14 RX ADMIN — GABAPENTIN 300 MG: 300 CAPSULE ORAL at 17:32

## 2019-01-14 RX ADMIN — MORPHINE SULFATE 15 MG: 15 TABLET, EXTENDED RELEASE ORAL at 05:05

## 2019-01-14 RX ADMIN — CELECOXIB 200 MG: 200 CAPSULE ORAL at 17:32

## 2019-01-14 RX ADMIN — ENOXAPARIN SODIUM 30 MG: 100 INJECTION SUBCUTANEOUS at 05:05

## 2019-01-14 RX ADMIN — NICOTINE 21 MG: 21 PATCH, EXTENDED RELEASE TRANSDERMAL at 19:24

## 2019-01-14 RX ADMIN — Medication 1 EACH: at 17:32

## 2019-01-14 RX ADMIN — GABAPENTIN 300 MG: 300 CAPSULE ORAL at 12:23

## 2019-01-14 RX ADMIN — OXYCODONE HYDROCHLORIDE 10 MG: 10 TABLET ORAL at 00:33

## 2019-01-14 RX ADMIN — METAXALONE 800 MG: 800 TABLET ORAL at 12:23

## 2019-01-14 RX ADMIN — DOCUSATE SODIUM 100 MG: 100 CAPSULE, LIQUID FILLED ORAL at 05:05

## 2019-01-14 RX ADMIN — ENOXAPARIN SODIUM 30 MG: 100 INJECTION SUBCUTANEOUS at 17:32

## 2019-01-14 RX ADMIN — LIDOCAINE 1 PATCH: 50 PATCH TOPICAL at 12:23

## 2019-01-14 RX ADMIN — Medication 1 EACH: at 12:23

## 2019-01-14 RX ADMIN — OXYCODONE HYDROCHLORIDE 5 MG: 5 TABLET ORAL at 19:24

## 2019-01-14 RX ADMIN — METAXALONE 800 MG: 800 TABLET ORAL at 17:32

## 2019-01-14 RX ADMIN — OXYCODONE HYDROCHLORIDE 5 MG: 5 TABLET ORAL at 10:13

## 2019-01-14 RX ADMIN — GABAPENTIN 300 MG: 300 CAPSULE ORAL at 05:05

## 2019-01-14 RX ADMIN — FAMOTIDINE 20 MG: 20 TABLET ORAL at 17:32

## 2019-01-14 RX ADMIN — MORPHINE SULFATE 2 MG: 4 INJECTION INTRAVENOUS at 10:13

## 2019-01-14 RX ADMIN — OXYCODONE HYDROCHLORIDE 5 MG: 5 TABLET ORAL at 22:26

## 2019-01-14 RX ADMIN — MORPHINE SULFATE 15 MG: 15 TABLET, EXTENDED RELEASE ORAL at 17:32

## 2019-01-14 RX ADMIN — OXYCODONE HYDROCHLORIDE 5 MG: 5 TABLET ORAL at 16:24

## 2019-01-14 RX ADMIN — ACETAMINOPHEN 650 MG: 325 TABLET, FILM COATED ORAL at 12:23

## 2019-01-14 RX ADMIN — METAXALONE 800 MG: 800 TABLET ORAL at 05:05

## 2019-01-14 RX ADMIN — DOCUSATE SODIUM 100 MG: 100 CAPSULE, LIQUID FILLED ORAL at 17:32

## 2019-01-14 RX ADMIN — ACETAMINOPHEN 650 MG: 325 TABLET, FILM COATED ORAL at 19:08

## 2019-01-14 RX ADMIN — CELECOXIB 200 MG: 200 CAPSULE ORAL at 05:05

## 2019-01-14 ASSESSMENT — ENCOUNTER SYMPTOMS
SHORTNESS OF BREATH: 0
ABDOMINAL PAIN: 0
CHILLS: 0
DOUBLE VISION: 0
VOMITING: 0
NAUSEA: 0
ROS GI COMMENTS: BM 1/11
NECK PAIN: 1
PALPITATIONS: 0
MYALGIAS: 1
FOCAL WEAKNESS: 0
FEVER: 0

## 2019-01-14 ASSESSMENT — PAIN SCALES - GENERAL
PAINLEVEL_OUTOF10: 8
PAINLEVEL_OUTOF10: 7
PAINLEVEL_OUTOF10: 10
PAINLEVEL_OUTOF10: 8
PAINLEVEL_OUTOF10: 7
PAINLEVEL_OUTOF10: 4
PAINLEVEL_OUTOF10: 9

## 2019-01-14 NOTE — PROGRESS NOTES
"Patient is refusing oral pain meds stating \"I need something IV, I'll only take IV, I won't swallow nothing\".  Patient educated regarding risks/benefits of oral pain meds versus IV pain meds.  Patient also educated that medical team has already determined that no more IV pain meds would be allowed.  Patient now threatening to leave AMA.  DANIELLE Stein updated.  "
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
## 2019-01-14 NOTE — PROGRESS NOTES
Pt wanting to use visitor bathroom to have a BM, pt refused staff to be present. Wheeled pt to visitor bathroom, found [employer] bag with cheeseburger and fries. Pt reports not his food although french fries found in meal tray nectar thick soup and on  tray. Placed food in refrigerator. Offered male staff to assist pt in visitor bathroom, pt unable to have BM. Assisted pt back to room. Pt adamanet about using bathroom without staff assistance and privacy. Educated pt about safety issues. Assisted pt to bathroom, pt kneeled over toilet lighting cigarette. Cigarette removed from pt and charge RN aware. Pt agreed to search his belongings. No other dangerous items found. Educated pt about dangers, pt verbalizes understanding but states he wants to go outside to smoke. Pt also made statements about leaving AMA. Encouraged pt to stay for treatment. Pt became agitated, answered orientation questions appropriately.     1933: Jasmine ALMANZA made aware of events, of pt's noncompliance with diet and wanting to leave AMA.     2000: Pt's son Brendan at bedside, reports he came from [place] to help care for his father. Brendan encouraging pt to stay for treatment, assisting to help calm and relax pt. Pt wanting to leave AMA unless he gets to go outside to have a cigarette. Pt refusing nicotine patch and gum. Jasmine ALMANZA notified.     2100: Pt agreeable to stay for treatment. Pt agreeable to be medicated with oxycodone for pain and Benadryl for sleep.
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
## 2019-01-14 NOTE — PROGRESS NOTES
Telephone order with read back to discontinue Xanax, one time dose 25 mg PO Benadryl for sleep, may repeat in one hour if unable to sleep, increase oxycodone frequency from q 4 to q 3 hours for pain per Jasmine ALMANZA.
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
## 2019-01-14 NOTE — PROGRESS NOTES
2 RN skin check:     Small abrasions to head. Lidocaine patch removed to posterior neck. Skin pink and blanching underneath c-collar. Heels dry and flaky. Abrasions to arms and abdomen.

## 2019-01-14 NOTE — PROGRESS NOTES
Assumed care of patient at 0700.  Report received at bedside.  Patient is A&O x 4, standby assist, c-collar in place.  Patient willams snot call for assistance and is impulsive.  RN remains close to room for safety.  Hourly rounding in place.

## 2019-01-14 NOTE — CARE PLAN
Problem: Pain Management  Goal: Pain level will decrease to patient's comfort goal  Outcome: PROGRESSING AS EXPECTED  Medicating for pain per MAR     Problem: Psychosocial Needs:  Goal: Level of anxiety will decrease  Outcome: PROGRESSING AS EXPECTED  Providing low stimuli environment   Grouping cares together to provide pt with maximum time for sleep       Problem: Respiratory:  Goal: Respiratory status will improve  Outcome: PROGRESSING AS EXPECTED   placed on pt   Sats mid 90s on RA   Pt refusing to use IS
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
## 2019-01-14 NOTE — DISCHARGE PLANNING
Agency[facility]ility [facility]  Outcome: Attempted to follow up, however, no answer. Voicemail left for Lucy.    Agency/Facility Name: [facility]  Spoke To: Colleen  Outcome: Referral sent to updated fax #:[phone].    Agency/Facility Name: [facility]  Spoke To: Emelyn  Outcome: Patient declined at this time as they are not contracted with patient's insurance, however, if patient gets declined by surrounding acute rehabs, they may be able to pursue NIKO with insurance. Doris(PAMELA).

## 2019-01-14 NOTE — THERAPY
"Speech Language Therapy dysphagia treatment completed.     Functional Status: Pt was seen at bedside for f/u dysphagia tx session. Pt alert and fully participatory with all therapy objectives. Pt was observed to be somewhat fidgety/restless throughout session; however, reported feeling eager for potential diet upgrade. Per discussion with RN, Pt \"snuck\" some cheeseburgers to which Pt confirmed. He appeared to boast about his ability to tolerate advanced solid textures, repeatedly stating, \"no problems. no problems.\" Pt agreeable to PO trials.     Pt was administered PO trials of ice chips, thin liquids (tsps, cup sips), and soft/mixed solids (Dysphagia 2 + 3 solids). Pt demonstrated no subtle nor overt clinical s/sx of aspiration/penetration with intake of tsps, cup sips of thin liquids across trials x10. Pt with clear vocal quality, and timely swallow. Pt with intermittent double swallow, possibly due to pharyngeal weakness; however, this compensatory strategy appeared functional. Pt demonstrated overt coughing with first bite of peaches due to reduced/inadequate mastication. With verbal cue to chew completely, and not 'show off' by trying to chew/swallow quickly, Pt demonstrated no further coughing, choking and/or other s/sx of aspiration/penetration across x8. Pt consumed remainder of fruit cocktail cup (pears, pineapples) with juice independently and demonstrated improved adherence to slow feeding rate, appropriate mastication and small bite size. With strict adherence to safe swallow precautions, recommend diet upgrade to Dysphagia 2 solids, Thin liquids with monitoring from nursing staff. RN updated and aware. SLP following.    Recommendations:   1.) With strict adherence to safe swallow precautions, recommend diet upgrade to Dysphagia 2 solids, Thin liquids   2.) Hold PO if any swallowing difficulties and/or lack of adherence to small bites/sips with slow feeding rate    Plan of Care: Will benefit from Speech " "Therapy 3 times per week  Post-Acute Therapy: Recommend inpatient transitional care services for continued speech therapy services.      See \"Rehab Therapy-Acute\" Patient Summary Report for complete documentation.     "

## 2019-01-14 NOTE — CARE PLAN
Problem: Safety  Goal: Will remain free from falls  Outcome: PROGRESSING AS EXPECTED      Problem: Psychosocial Needs:  Goal: Level of anxiety will decrease  Outcome: PROGRESSING SLOWER THAN EXPECTED

## 2019-01-14 NOTE — PROGRESS NOTES
· 2 RN skin check complete with BRUALIO Daly.  · Devices in place C-collar.  · Skin assessed under devices intact.

## 2019-01-15 PROBLEM — Z75.8 DISCHARGE PLANNING ISSUES: Status: ACTIVE | Noted: 2019-01-15

## 2019-01-15 PROBLEM — F20.9 SCHIZOPHRENIA (HCC): Status: ACTIVE | Noted: 2019-01-15

## 2019-01-15 PROBLEM — Z02.9 DISCHARGE PLANNING ISSUES: Status: ACTIVE | Noted: 2019-01-15

## 2019-01-15 LAB
ANION GAP SERPL CALC-SCNC: 7 MMOL/L (ref 0–11.9)
BASOPHILS # BLD AUTO: 0.5 % (ref 0–1.8)
BASOPHILS # BLD: 0.05 K/UL (ref 0–0.12)
BUN SERPL-MCNC: 37 MG/DL (ref 8–22)
CALCIUM SERPL-MCNC: 8.9 MG/DL (ref 8.5–10.5)
CHLORIDE SERPL-SCNC: 100 MMOL/L (ref 96–112)
CO2 SERPL-SCNC: 27 MMOL/L (ref 20–33)
CREAT SERPL-MCNC: 1.22 MG/DL (ref 0.5–1.4)
EOSINOPHIL # BLD AUTO: 0.29 K/UL (ref 0–0.51)
EOSINOPHIL NFR BLD: 2.8 % (ref 0–6.9)
ERYTHROCYTE [DISTWIDTH] IN BLOOD BY AUTOMATED COUNT: 40.7 FL (ref 35.9–50)
GLUCOSE SERPL-MCNC: 100 MG/DL (ref 65–99)
HCT VFR BLD AUTO: 37 % (ref 42–52)
HGB BLD-MCNC: 12.3 G/DL (ref 14–18)
IMM GRANULOCYTES # BLD AUTO: 0.24 K/UL (ref 0–0.11)
IMM GRANULOCYTES NFR BLD AUTO: 2.3 % (ref 0–0.9)
LYMPHOCYTES # BLD AUTO: 3.36 K/UL (ref 1–4.8)
LYMPHOCYTES NFR BLD: 32.1 % (ref 22–41)
MCH RBC QN AUTO: 29.6 PG (ref 27–33)
MCHC RBC AUTO-ENTMCNC: 33.2 G/DL (ref 33.7–35.3)
MCV RBC AUTO: 89.2 FL (ref 81.4–97.8)
MONOCYTES # BLD AUTO: 0.69 K/UL (ref 0–0.85)
MONOCYTES NFR BLD AUTO: 6.6 % (ref 0–13.4)
NEUTROPHILS # BLD AUTO: 5.85 K/UL (ref 1.82–7.42)
NEUTROPHILS NFR BLD: 55.7 % (ref 44–72)
NRBC # BLD AUTO: 0 K/UL
NRBC BLD-RTO: 0 /100 WBC
PLATELET # BLD AUTO: 478 K/UL (ref 164–446)
PMV BLD AUTO: 8.6 FL (ref 9–12.9)
POTASSIUM SERPL-SCNC: 4 MMOL/L (ref 3.6–5.5)
RBC # BLD AUTO: 4.15 M/UL (ref 4.7–6.1)
SODIUM SERPL-SCNC: 134 MMOL/L (ref 135–145)
WBC # BLD AUTO: 10.5 K/UL (ref 4.8–10.8)

## 2019-01-15 PROCEDURE — 36415 COLL VENOUS BLD VENIPUNCTURE: CPT

## 2019-01-15 PROCEDURE — A9270 NON-COVERED ITEM OR SERVICE: HCPCS | Performed by: NURSE PRACTITIONER

## 2019-01-15 PROCEDURE — 700102 HCHG RX REV CODE 250 W/ 637 OVERRIDE(OP): Performed by: NURSE PRACTITIONER

## 2019-01-15 PROCEDURE — 85025 COMPLETE CBC W/AUTO DIFF WBC: CPT

## 2019-01-15 PROCEDURE — 700112 HCHG RX REV CODE 229: Performed by: NURSE PRACTITIONER

## 2019-01-15 PROCEDURE — 700102 HCHG RX REV CODE 250 W/ 637 OVERRIDE(OP): Performed by: STUDENT IN AN ORGANIZED HEALTH CARE EDUCATION/TRAINING PROGRAM

## 2019-01-15 PROCEDURE — 700101 HCHG RX REV CODE 250: Performed by: NURSE PRACTITIONER

## 2019-01-15 PROCEDURE — 770006 HCHG ROOM/CARE - MED/SURG/GYN SEMI*

## 2019-01-15 PROCEDURE — A9270 NON-COVERED ITEM OR SERVICE: HCPCS | Performed by: STUDENT IN AN ORGANIZED HEALTH CARE EDUCATION/TRAINING PROGRAM

## 2019-01-15 PROCEDURE — 92526 ORAL FUNCTION THERAPY: CPT

## 2019-01-15 PROCEDURE — 80048 BASIC METABOLIC PNL TOTAL CA: CPT

## 2019-01-15 PROCEDURE — 700111 HCHG RX REV CODE 636 W/ 250 OVERRIDE (IP): Performed by: SURGERY

## 2019-01-15 RX ADMIN — DOCUSATE SODIUM 100 MG: 100 CAPSULE, LIQUID FILLED ORAL at 10:40

## 2019-01-15 RX ADMIN — GABAPENTIN 300 MG: 300 CAPSULE ORAL at 17:38

## 2019-01-15 RX ADMIN — OXYCODONE HYDROCHLORIDE 5 MG: 5 TABLET ORAL at 19:44

## 2019-01-15 RX ADMIN — METAXALONE 800 MG: 800 TABLET ORAL at 17:38

## 2019-01-15 RX ADMIN — OXYCODONE HYDROCHLORIDE 5 MG: 5 TABLET ORAL at 13:13

## 2019-01-15 RX ADMIN — GABAPENTIN 300 MG: 300 CAPSULE ORAL at 13:13

## 2019-01-15 RX ADMIN — Medication 1 EACH: at 17:38

## 2019-01-15 RX ADMIN — OXYCODONE HYDROCHLORIDE 5 MG: 5 TABLET ORAL at 04:36

## 2019-01-15 RX ADMIN — ENOXAPARIN SODIUM 30 MG: 100 INJECTION SUBCUTANEOUS at 10:41

## 2019-01-15 RX ADMIN — GABAPENTIN 300 MG: 300 CAPSULE ORAL at 04:59

## 2019-01-15 RX ADMIN — CELECOXIB 200 MG: 200 CAPSULE ORAL at 10:38

## 2019-01-15 RX ADMIN — FAMOTIDINE 20 MG: 20 TABLET ORAL at 10:39

## 2019-01-15 RX ADMIN — DOCUSATE SODIUM 100 MG: 100 CAPSULE, LIQUID FILLED ORAL at 17:38

## 2019-01-15 RX ADMIN — MORPHINE SULFATE 15 MG: 15 TABLET, EXTENDED RELEASE ORAL at 04:59

## 2019-01-15 RX ADMIN — MORPHINE SULFATE 15 MG: 15 TABLET, EXTENDED RELEASE ORAL at 17:37

## 2019-01-15 RX ADMIN — OXYCODONE HYDROCHLORIDE 5 MG: 5 TABLET ORAL at 10:18

## 2019-01-15 RX ADMIN — ENOXAPARIN SODIUM 30 MG: 100 INJECTION SUBCUTANEOUS at 23:06

## 2019-01-15 RX ADMIN — OXYCODONE HYDROCHLORIDE 5 MG: 5 TABLET ORAL at 23:06

## 2019-01-15 RX ADMIN — LIDOCAINE 1 PATCH: 50 PATCH TOPICAL at 13:12

## 2019-01-15 RX ADMIN — POLYETHYLENE GLYCOL 3350 1 PACKET: 17 POWDER, FOR SOLUTION ORAL at 10:40

## 2019-01-15 RX ADMIN — CELECOXIB 200 MG: 200 CAPSULE ORAL at 04:59

## 2019-01-15 RX ADMIN — FAMOTIDINE 20 MG: 20 TABLET ORAL at 17:38

## 2019-01-15 RX ADMIN — METAXALONE 800 MG: 800 TABLET ORAL at 10:38

## 2019-01-15 RX ADMIN — POLYETHYLENE GLYCOL 3350 1 PACKET: 17 POWDER, FOR SOLUTION ORAL at 17:38

## 2019-01-15 RX ADMIN — NICOTINE 21 MG: 21 PATCH, EXTENDED RELEASE TRANSDERMAL at 10:42

## 2019-01-15 RX ADMIN — SENNOSIDES AND DOCUSATE SODIUM 1 TABLET: 8.6; 5 TABLET ORAL at 19:44

## 2019-01-15 RX ADMIN — ARIPIPRAZOLE 5 MG: 10 TABLET ORAL at 10:39

## 2019-01-15 RX ADMIN — Medication 1 EACH: at 10:38

## 2019-01-15 RX ADMIN — OXYCODONE HYDROCHLORIDE 5 MG: 5 TABLET ORAL at 01:31

## 2019-01-15 ASSESSMENT — PAIN SCALES - GENERAL
PAINLEVEL_OUTOF10: 7
PAINLEVEL_OUTOF10: 8
PAINLEVEL_OUTOF10: 10
PAINLEVEL_OUTOF10: 10

## 2019-01-15 ASSESSMENT — ENCOUNTER SYMPTOMS
ROS GI COMMENTS: BM 1/12
NAUSEA: 0
ABDOMINAL PAIN: 0
DOUBLE VISION: 0
FOCAL WEAKNESS: 0
NECK PAIN: 1
VOMITING: 0
MYALGIAS: 1
FEVER: 0
CHILLS: 0
SHORTNESS OF BREATH: 0
PALPITATIONS: 0

## 2019-01-15 NOTE — PROGRESS NOTES
"Pt currently experiencing nausea. Pt states that this is only from pain. Pt was offered anti-nausea medication but is refusing at this time. Pt continues to refuse to wear his c-collar. Pt was offered ordered PRN acetaminophen for his pain but is also refusing this at this time. When asked what staff can do for him the pt responded that this RN can \"go fuck yourself\". Will continue to monitor and assess.  "

## 2019-01-15 NOTE — PROGRESS NOTES
Pt is very irritable at this time, pt is refusing to wear his c-collar at this time. Pt is stating that he is going to sign out AMA tomorrow after the doctors come by. This RN stated to the pt that the provider for trauma on-call has already been notified about the pt's issue swallowing and persisting pain. Pt is frequently calling the hospital  in an attempt to call the doctor. Staff has reinforced with the pt that due to the psychiatric eval performed today that he is not able to sign himself out AMA. Pt is not happy to hear this news, which has further exacerbated his anger and sense of frustration. Staff has explained to the pt the rationale for this decision by psychiatry but the pt's frustration and irritability are proving to be significant obstacles to this. Will continue to try to find methods to help the pt relax.

## 2019-01-15 NOTE — THERAPY
Attempted to see pt for OT tx. RN asked therapy to hold at this time d/t behaviors. Pt seen up amb in hallway w/ sitter earlier this AM. Will try again later as appropriate.

## 2019-01-15 NOTE — PROGRESS NOTES
2 RN skin assessment complete, no new concerns at this time. Preventative measures in place    Mom is calling to say that Mike is breaking out from a rash from his amoxicillin, should she continue to give it to him? Please call her back at her work number at [phone] ext [phone]

## 2019-01-15 NOTE — DISCHARGE PLANNING
Agency/Facility Name: [facility]  Spoke To: Admissions  Outcome: Patient declined- non-contracted insurance provider.    Agency[facility]Facility [facility]  Plan or Request: Referral sent to acute rehab facilities listed above per Doris's(LSW) request.

## 2019-01-15 NOTE — THERAPY
Attempted therapy follow up session. Per RN hold at this time due to pat's behavior and agitation. Will continue to follow up as appropriate.

## 2019-01-15 NOTE — PROGRESS NOTES
Received bedside report and assumed care. Patient resting in bed. [facility] NP Trauma called and advised that patient is not capacitated to make his own decisions. Can not leave AMA. Hopefully Ambilify will kick in to help with pts impulsivness. 5 minutes after this phone call pt up out of bed in the hallway. Directed back to his room. He then got in the shower w/o assistance with water overflowing in the hallway. Called Sarita and advised her patient's non compliance. Refused all meds except pain medication. Advised charge nurse of pts status. Patient needs to be a desk bed plus we need a 1:1 sitter to keep pt from harming himself and/or others.  Sarita advised she will come and see the patient. [facility]

## 2019-01-15 NOTE — PROGRESS NOTES
Patient calm and cooperative. Took all of his meds for me. ST at bedside also and observed pt take meds. Also, mashed pot arrived and pt ate with ST. Bactrim applied to wounds. Pain down to 7/10 at this time. WCTEGAN

## 2019-01-15 NOTE — CARE PLAN
Problem: Psychosocial Needs:  Goal: Level of anxiety will decrease  At shift change this patient was bonkers, aggressive verbally, distraught, restless, upset, states in pain. Took about an hour to get the patient calmed down.   Spoke with Sarita regarding meds. Got the patient to settle down after giving pain med. Also took all meds he missed this morning. Patient much calmer.  Ordered more food, mashed potatoes, which the ST was able to give him during her eval. Patient compliant, calm, and settled down at this time.   Lidocaine patch on neck with warm pack. Patient resting quietly with eyes closed.

## 2019-01-15 NOTE — PROGRESS NOTES
Received call from pt's room. Pt asking for his dinner tray. Kitchen currently closed. Requisition form completed and provided pt with cottage cheese, blueberry yogurt, and apple oatmeal. Placed on pt's bedside table as pt currently getting dressed in bathroom with RN.

## 2019-01-15 NOTE — PROGRESS NOTES
Patient has calmed down. Patient and nurse, supervisor, and charge nurse has spoken to the patient addressing his needs and issues. Patient agreed to take his morning meds if I went ahead and gave him pain meds. Patient lying in bed resting quietly at this time. Will retrieve morning meds and give to the patient per our agreenment. WCTM.

## 2019-01-15 NOTE — DISCHARGE PLANNING
Agency[facility]Facility Name: [facility]  Spoke To: Chela  Outcome: Patient declined- out of [facility]. Not contracted with insurance plan.

## 2019-01-15 NOTE — THERAPY
"Speech Language Therapy dysphagia treatment completed.   Functional Status:  Variable behavior with psych following. Behavior issues this AM. Nurs in with SLP to administer meds. Pt demonstrating awareness regarding earlier negative behavior and apologizing. Pt taking whole small and large pills slowly and one a time with sips of thin liquid using a straw. No coughing post swallow. Following several pills and sips of thin water, pt with mild change in vocal quality. Pt educ to clear his throat and to dry swallow. Vocal quality WNL. Pt then required min cues to slow rate during intake of one cup of pudding and one cup of mashed potatoes that pt had ordered. Swallow strategies amended for D2 thins. Pt re-educ regarding ground diet with thin liquids recommended at this time. Pt stating he ate a burrito, that was brought into him, last PM. Nurs also requesting pt to follow diet recommendations until he is upgraded to a regular diet. Staff posted large print sign posted on pt's door to inform visitors to not bring in food items at this time. Swallow strategies reposted to reflect D2 thins as tolerated.   Recommendations: D2 thins, cognitve tx  Plan of Care: Will benefit from Speech Therapy 3 times per week  Post-Acute Therapy: dysphagia/cognitve. Thanks, kameron    See \"Rehab Therapy-Acute\" Patient Summary Report for complete documentation.     "

## 2019-01-15 NOTE — PROGRESS NOTES
Pt is extremely agitated, states that he ordered a second meal tray and is upset that has not yet been delivered. Explained to pt that staff had reached out to dietary to see what was going on with the meal tray. Snack options available on the floor were offered to the pt, but he refused. Pt is very impulsive in the room at this time, refuses to stay seated. Staff remaining in the room with the pt at this time until he is able to calm down. Pt stating that he needs a shower, went into the bathroom and turned on the water so that it would start flowing onto the floor and flooding the bathroom. Pt agreed to shower with staff present due to his high level of impulsivity. Pt was assisted with bathing which helped to calm him down.

## 2019-01-15 NOTE — PROGRESS NOTES
"Pt has family visitors in room who have brought him outside food. Pt and family visitors were informed that pt was on a specialized diet for safety purposes and that this was why he was not to have outside food brought into him. Pt is currently eating a burrito and is refusing to relinquish it or stop eating it. Pt explicitly expressed that he is not concerned with his diet order. Pt's family states that they were not aware of not being able to bring in outside food (there is a sign on the door asking that no outside food be brought in to the patient), and after being told why they were not allowed to bring it in they stated that a \"lady up front\" told them that it would be ok to give the pt this outside food.  "

## 2019-01-15 NOTE — PROGRESS NOTES
"Pt complaining that he can't swallow. This RN noticed no appreciable difference in his verbalization or any other indicators of dysphagia. Notified Jasmine ALMANZA with the trauma services of the new pt developments. This RN was told that the pt will be re-evaluated in the morning by the trauma service when they round. In the meantime this RN is to crush his pills in applesauce prior to administering them. The pt was informed of this and is now claiming that he has no issues swallowing and that he just wants \"to be able to go to sleep\". Will continue to monitor.  "

## 2019-01-15 NOTE — ASSESSMENT & PLAN NOTE
SNF referral in process.  1/14 SLP recommend supervision upon discharge. Psych deemed patient DOES NOT HAVE CAPACITY TO MAKE MEDICAL DECISIONS.  1/23 [place]. Pt stating he will be living with sister, Meg, in [place], [place] upon discharge.  1/24 Meg would like to pursue guardianship of the patient, have the patient establish with [place] Medicaid and either transfer to a [place] rehab or discharge to her home with outpatient services.  1/26 Pt now reporting an adult son (Brendan Bonds) he wants to discharge home with.  1/28 Sister now not willing to take patient, also now reporting he has an adult son Brendan Bonds.  1/29 Unable to reach son. SNF referrals being expanded in the [facility].  2/7 Remains incapacitated to leave AMA per psychiatry  2/11 Continues to require 24/7 supervision upon discharge per speech therapy  Pursing SNF placement or home with 24/7 supervision.

## 2019-01-15 NOTE — PROGRESS NOTES
Received call from , pt asking to call on call physician and speak with charge RN. Upon entering pt room, pt not wearing c collar, not receptive to education. Allowed time for pt to express his frustrations, acknowledged pt's concerns and validated his feelings. Pt requested coffee, coffee provided and placed on pt's bedside table. Pt called unit phone again, stating that RN poured hot coffee on him. This RN spoke with pt that the coffee was delivered to his bedside table and this was the same nurse who delivered the coffee. Pt stated if he didn't get jerrica crackers he would throw the coffee at the wall. Pt was brought crushed jerrica crackers in coffee per his request.

## 2019-01-15 NOTE — DISCHARGE PLANNING
Agency/Facility Name: [facility]  Outcome: Attempted to follow up, however, no answer. Voicemail left for Chela.    Agency/Facility Name: [facility]  Outcome: Attempted to follow up, however, no answer. Voicemail left for Lucy.

## 2019-01-15 NOTE — PROGRESS NOTES
"Pt continues to complain of nausea. This RN again offered pt anti-nausea PRN medication, and the pt again refused. Pt remains irritable and is only asking for pain medication. Pt was informed that there is still about 30-40 minutes before he can have another dose of PRN oxycodone. This RN again offered PRN acetaminophen but the pt states \"Tylenol doesn't do shit\" and again refused. This RN informed the pt that he will check in with the pt once the PRN oxycodone is available to see if the pt needs pain medication at that time.  "

## 2019-01-15 NOTE — PROGRESS NOTES
Attempted to notify the pt's emergency contact as listed in the chart (Brendan) to update him on the pt's current condition. Multiple attempts yielded only a busy signal for the other line. Will continue to attempt to reach him.
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
## 2019-01-16 LAB
ANION GAP SERPL CALC-SCNC: 6 MMOL/L (ref 0–11.9)
BASOPHILS # BLD AUTO: 0.4 % (ref 0–1.8)
BASOPHILS # BLD: 0.04 K/UL (ref 0–0.12)
BUN SERPL-MCNC: 25 MG/DL (ref 8–22)
CALCIUM SERPL-MCNC: 9 MG/DL (ref 8.5–10.5)
CHLORIDE SERPL-SCNC: 96 MMOL/L (ref 96–112)
CO2 SERPL-SCNC: 26 MMOL/L (ref 20–33)
CREAT SERPL-MCNC: 0.72 MG/DL (ref 0.5–1.4)
EOSINOPHIL # BLD AUTO: 0.23 K/UL (ref 0–0.51)
EOSINOPHIL NFR BLD: 2.2 % (ref 0–6.9)
ERYTHROCYTE [DISTWIDTH] IN BLOOD BY AUTOMATED COUNT: 40.5 FL (ref 35.9–50)
GLUCOSE SERPL-MCNC: 100 MG/DL (ref 65–99)
HCT VFR BLD AUTO: 37.9 % (ref 42–52)
HGB BLD-MCNC: 12.6 G/DL (ref 14–18)
IMM GRANULOCYTES # BLD AUTO: 0.25 K/UL (ref 0–0.11)
IMM GRANULOCYTES NFR BLD AUTO: 2.3 % (ref 0–0.9)
LYMPHOCYTES # BLD AUTO: 3.29 K/UL (ref 1–4.8)
LYMPHOCYTES NFR BLD: 30.8 % (ref 22–41)
MCH RBC QN AUTO: 29.4 PG (ref 27–33)
MCHC RBC AUTO-ENTMCNC: 33.2 G/DL (ref 33.7–35.3)
MCV RBC AUTO: 88.6 FL (ref 81.4–97.8)
MONOCYTES # BLD AUTO: 0.67 K/UL (ref 0–0.85)
MONOCYTES NFR BLD AUTO: 6.3 % (ref 0–13.4)
NEUTROPHILS # BLD AUTO: 6.2 K/UL (ref 1.82–7.42)
NEUTROPHILS NFR BLD: 58 % (ref 44–72)
NRBC # BLD AUTO: 0 K/UL
NRBC BLD-RTO: 0 /100 WBC
PLATELET # BLD AUTO: 494 K/UL (ref 164–446)
PMV BLD AUTO: 8.4 FL (ref 9–12.9)
POTASSIUM SERPL-SCNC: 4.4 MMOL/L (ref 3.6–5.5)
RBC # BLD AUTO: 4.28 M/UL (ref 4.7–6.1)
SODIUM SERPL-SCNC: 128 MMOL/L (ref 135–145)
WBC # BLD AUTO: 10.7 K/UL (ref 4.8–10.8)

## 2019-01-16 PROCEDURE — 700102 HCHG RX REV CODE 250 W/ 637 OVERRIDE(OP): Performed by: NURSE PRACTITIONER

## 2019-01-16 PROCEDURE — A9270 NON-COVERED ITEM OR SERVICE: HCPCS | Performed by: NURSE PRACTITIONER

## 2019-01-16 PROCEDURE — 99233 SBSQ HOSP IP/OBS HIGH 50: CPT | Performed by: PSYCHIATRY & NEUROLOGY

## 2019-01-16 PROCEDURE — 700102 HCHG RX REV CODE 250 W/ 637 OVERRIDE(OP): Performed by: PSYCHIATRY & NEUROLOGY

## 2019-01-16 PROCEDURE — A9270 NON-COVERED ITEM OR SERVICE: HCPCS | Performed by: STUDENT IN AN ORGANIZED HEALTH CARE EDUCATION/TRAINING PROGRAM

## 2019-01-16 PROCEDURE — 700111 HCHG RX REV CODE 636 W/ 250 OVERRIDE (IP): Performed by: SURGERY

## 2019-01-16 PROCEDURE — 85025 COMPLETE CBC W/AUTO DIFF WBC: CPT

## 2019-01-16 PROCEDURE — 97535 SELF CARE MNGMENT TRAINING: CPT

## 2019-01-16 PROCEDURE — 700102 HCHG RX REV CODE 250 W/ 637 OVERRIDE(OP): Performed by: STUDENT IN AN ORGANIZED HEALTH CARE EDUCATION/TRAINING PROGRAM

## 2019-01-16 PROCEDURE — 80048 BASIC METABOLIC PNL TOTAL CA: CPT

## 2019-01-16 PROCEDURE — 700101 HCHG RX REV CODE 250: Performed by: NURSE PRACTITIONER

## 2019-01-16 PROCEDURE — 36415 COLL VENOUS BLD VENIPUNCTURE: CPT

## 2019-01-16 PROCEDURE — 770001 HCHG ROOM/CARE - MED/SURG/GYN PRIV*

## 2019-01-16 PROCEDURE — 97116 GAIT TRAINING THERAPY: CPT

## 2019-01-16 PROCEDURE — 700112 HCHG RX REV CODE 229: Performed by: NURSE PRACTITIONER

## 2019-01-16 RX ORDER — DIVALPROEX SODIUM 125 MG/1
125 CAPSULE, COATED PELLETS ORAL EVERY 8 HOURS
Status: DISCONTINUED | OUTPATIENT
Start: 2019-01-16 | End: 2019-01-22

## 2019-01-16 RX ADMIN — GABAPENTIN 300 MG: 300 CAPSULE ORAL at 05:06

## 2019-01-16 RX ADMIN — METAXALONE 800 MG: 800 TABLET ORAL at 17:21

## 2019-01-16 RX ADMIN — OXYCODONE HYDROCHLORIDE 5 MG: 5 TABLET ORAL at 14:10

## 2019-01-16 RX ADMIN — MORPHINE SULFATE 15 MG: 15 TABLET, EXTENDED RELEASE ORAL at 17:22

## 2019-01-16 RX ADMIN — DIVALPROEX SODIUM 125 MG: 125 CAPSULE, COATED PELLETS ORAL at 20:30

## 2019-01-16 RX ADMIN — CELECOXIB 200 MG: 200 CAPSULE ORAL at 17:22

## 2019-01-16 RX ADMIN — ACETAMINOPHEN 650 MG: 325 TABLET, FILM COATED ORAL at 23:22

## 2019-01-16 RX ADMIN — Medication 1 EACH: at 12:15

## 2019-01-16 RX ADMIN — NICOTINE 21 MG: 21 PATCH, EXTENDED RELEASE TRANSDERMAL at 05:08

## 2019-01-16 RX ADMIN — DOCUSATE SODIUM 100 MG: 100 CAPSULE, LIQUID FILLED ORAL at 17:22

## 2019-01-16 RX ADMIN — GABAPENTIN 300 MG: 300 CAPSULE ORAL at 17:22

## 2019-01-16 RX ADMIN — CELECOXIB 200 MG: 200 CAPSULE ORAL at 05:07

## 2019-01-16 RX ADMIN — Medication 1 EACH: at 17:22

## 2019-01-16 RX ADMIN — MAGNESIUM HYDROXIDE 30 ML: 400 SUSPENSION ORAL at 05:08

## 2019-01-16 RX ADMIN — DOCUSATE SODIUM 100 MG: 100 CAPSULE, LIQUID FILLED ORAL at 05:07

## 2019-01-16 RX ADMIN — METAXALONE 800 MG: 800 TABLET ORAL at 12:15

## 2019-01-16 RX ADMIN — LIDOCAINE 1 PATCH: 50 PATCH TOPICAL at 12:15

## 2019-01-16 RX ADMIN — ARIPIPRAZOLE 5 MG: 10 TABLET ORAL at 05:06

## 2019-01-16 RX ADMIN — MORPHINE SULFATE 15 MG: 15 TABLET, EXTENDED RELEASE ORAL at 05:06

## 2019-01-16 RX ADMIN — OXYCODONE HYDROCHLORIDE 5 MG: 5 TABLET ORAL at 02:28

## 2019-01-16 RX ADMIN — OXYCODONE HYDROCHLORIDE 5 MG: 5 TABLET ORAL at 23:22

## 2019-01-16 RX ADMIN — ACETAMINOPHEN 650 MG: 325 TABLET, FILM COATED ORAL at 16:30

## 2019-01-16 RX ADMIN — ENOXAPARIN SODIUM 30 MG: 100 INJECTION SUBCUTANEOUS at 05:08

## 2019-01-16 RX ADMIN — METAXALONE 800 MG: 800 TABLET ORAL at 05:07

## 2019-01-16 RX ADMIN — ENOXAPARIN SODIUM 30 MG: 100 INJECTION SUBCUTANEOUS at 17:22

## 2019-01-16 RX ADMIN — OXYCODONE HYDROCHLORIDE 5 MG: 5 TABLET ORAL at 17:21

## 2019-01-16 RX ADMIN — OXYCODONE HYDROCHLORIDE 5 MG: 5 TABLET ORAL at 08:11

## 2019-01-16 RX ADMIN — GABAPENTIN 300 MG: 300 CAPSULE ORAL at 12:15

## 2019-01-16 RX ADMIN — OXYCODONE HYDROCHLORIDE 5 MG: 5 TABLET ORAL at 20:30

## 2019-01-16 RX ADMIN — FAMOTIDINE 20 MG: 20 TABLET ORAL at 05:07

## 2019-01-16 RX ADMIN — FAMOTIDINE 20 MG: 20 TABLET ORAL at 17:22

## 2019-01-16 RX ADMIN — DIVALPROEX SODIUM 125 MG: 125 CAPSULE, COATED PELLETS ORAL at 14:11

## 2019-01-16 ASSESSMENT — PAIN SCALES - GENERAL
PAINLEVEL_OUTOF10: 5
PAINLEVEL_OUTOF10: 5
PAINLEVEL_OUTOF10: 8
PAINLEVEL_OUTOF10: 8

## 2019-01-16 ASSESSMENT — ENCOUNTER SYMPTOMS
PALPITATIONS: 0
ROS GI COMMENTS: BM 1/12
FEVER: 0
MYALGIAS: 1
NECK PAIN: 1
VOMITING: 0
SHORTNESS OF BREATH: 0
FOCAL WEAKNESS: 0
DOUBLE VISION: 0
ABDOMINAL PAIN: 0
NAUSEA: 0

## 2019-01-16 ASSESSMENT — COGNITIVE AND FUNCTIONAL STATUS - GENERAL
WALKING IN HOSPITAL ROOM: A LITTLE
MOBILITY SCORE: 22
CLIMB 3 TO 5 STEPS WITH RAILING: A LITTLE
DAILY ACTIVITIY SCORE: 24
SUGGESTED CMS G CODE MODIFIER MOBILITY: CJ
SUGGESTED CMS G CODE MODIFIER DAILY ACTIVITY: CH

## 2019-01-16 ASSESSMENT — GAIT ASSESSMENTS
DISTANCE (FEET): 300
GAIT LEVEL OF ASSIST: SUPERVISED
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
## 2019-01-16 NOTE — DISCHARGE PLANNING
Agency/Facility Name: [facility]  Outcome: Patient declined- behaviors.    Agency/Facility Name: [facility]  Outcome: Attempted to follow up, however, no answer. Voicemail left for [place].

## 2019-01-16 NOTE — THERAPY
"Physical Therapy Treatment completed.   Bed Mobility:  Supine to Sit: Supervised  Transfers: Sit to Stand: Supervision  Gait: Level Of Assist: Supervised with No Equipment Needed       Plan of Care: Patient with no further skilled PT needs in the acute care setting at this time  Discharge Recommendations: Equipment: No Equipment Needed.     See \"Rehab Therapy-Acute\" Patient Summary Report for complete documentation.     Pt very pleasant and in cheerful mood this afternoon. Pt has progressed and reporting he has been getting up with nursing staff past few days. Pt able to perform bed mobility with supervision. Pt continues to be slightly impulsive as he  immediately stood up and started dancing. Provided cues to slow down and maintain spinal precautions, otherwise no LOB. Pt able to complete 300ft with no AD. Pt with no LOB and was able to perform quick stops and turning with no impairments in balance. Discussed with supervising physical therapist regarding pt meeting all goals and recommend he will require 24/7 supervision due to cognition.   "
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
## 2019-01-16 NOTE — PROGRESS NOTES
Assumed care of patient.  Report received from BRAULIO Richardson.  C-collar on, pt resting in bed.  Sitter at bedside.

## 2019-01-16 NOTE — PROGRESS NOTES
2 RN skin check completed with BRAULIO Hernandez. Abrasions to bilateral hips. No pressure sores under cervical collar, padding in place.

## 2019-01-16 NOTE — THERAPY
"Occupational Therapy Treatment completed with focus on ADLs.  Functional Status:  Pt supine to sit w/ supv. Pt was impulsive and has poor safety awareness. Pt sit to stand w/ supv. Supv to don pants in standing. Pt ambulated hallways w/o AD. Returned to bed w/ supv.    Plan of Care: Will discuss POC w/ OT regarding current goals met and frequency.  Discharge Recommendations:  Equipment Will Continue to Assess for Equipment Needs.     See \"Rehab Therapy-Acute\" Patient Summary Report for complete documentation.   "

## 2019-01-16 NOTE — CARE PLAN
Problem: Safety  Goal: Will remain free from injury  Outcome: PROGRESSING AS EXPECTED  Bed locked and in lowest position. Call light and personal belongings in reach. 1:1 sitter at bedside.     Problem: Venous Thromboembolism (VTW)/Deep Vein Thrombosis (DVT) Prevention:  Goal: Patient will participate in Venous Thrombosis (VTE)/Deep Vein Thrombosis (DVT)Prevention Measures  Outcome: PROGRESSING AS EXPECTED  Pt refusing SCDs at this time. Lovenox for DVT prophylaxis. Pt frequently ambulating in room.

## 2019-01-17 PROCEDURE — 700102 HCHG RX REV CODE 250 W/ 637 OVERRIDE(OP): Performed by: NURSE PRACTITIONER

## 2019-01-17 PROCEDURE — A9270 NON-COVERED ITEM OR SERVICE: HCPCS | Performed by: NURSE PRACTITIONER

## 2019-01-17 PROCEDURE — 700102 HCHG RX REV CODE 250 W/ 637 OVERRIDE(OP): Performed by: STUDENT IN AN ORGANIZED HEALTH CARE EDUCATION/TRAINING PROGRAM

## 2019-01-17 PROCEDURE — 700101 HCHG RX REV CODE 250: Performed by: NURSE PRACTITIONER

## 2019-01-17 PROCEDURE — 770001 HCHG ROOM/CARE - MED/SURG/GYN PRIV*

## 2019-01-17 PROCEDURE — 700112 HCHG RX REV CODE 229: Performed by: NURSE PRACTITIONER

## 2019-01-17 PROCEDURE — 700111 HCHG RX REV CODE 636 W/ 250 OVERRIDE (IP): Performed by: SURGERY

## 2019-01-17 PROCEDURE — A9270 NON-COVERED ITEM OR SERVICE: HCPCS | Performed by: STUDENT IN AN ORGANIZED HEALTH CARE EDUCATION/TRAINING PROGRAM

## 2019-01-17 PROCEDURE — 700102 HCHG RX REV CODE 250 W/ 637 OVERRIDE(OP): Performed by: PSYCHIATRY & NEUROLOGY

## 2019-01-17 RX ADMIN — MORPHINE SULFATE 15 MG: 15 TABLET, EXTENDED RELEASE ORAL at 16:47

## 2019-01-17 RX ADMIN — Medication 1 EACH: at 11:41

## 2019-01-17 RX ADMIN — OXYCODONE HYDROCHLORIDE 5 MG: 5 TABLET ORAL at 08:41

## 2019-01-17 RX ADMIN — GABAPENTIN 300 MG: 300 CAPSULE ORAL at 11:41

## 2019-01-17 RX ADMIN — CELECOXIB 200 MG: 200 CAPSULE ORAL at 16:47

## 2019-01-17 RX ADMIN — ARIPIPRAZOLE 5 MG: 10 TABLET ORAL at 05:09

## 2019-01-17 RX ADMIN — DOCUSATE SODIUM 100 MG: 100 CAPSULE, LIQUID FILLED ORAL at 16:49

## 2019-01-17 RX ADMIN — DIVALPROEX SODIUM 125 MG: 125 CAPSULE, COATED PELLETS ORAL at 14:40

## 2019-01-17 RX ADMIN — POLYETHYLENE GLYCOL 3350 1 PACKET: 17 POWDER, FOR SOLUTION ORAL at 05:15

## 2019-01-17 RX ADMIN — OXYCODONE HYDROCHLORIDE 5 MG: 5 TABLET ORAL at 23:32

## 2019-01-17 RX ADMIN — ACETAMINOPHEN 650 MG: 325 TABLET, FILM COATED ORAL at 08:41

## 2019-01-17 RX ADMIN — FAMOTIDINE 20 MG: 20 TABLET ORAL at 16:47

## 2019-01-17 RX ADMIN — DOCUSATE SODIUM 100 MG: 100 CAPSULE, LIQUID FILLED ORAL at 05:10

## 2019-01-17 RX ADMIN — ACETAMINOPHEN 650 MG: 325 TABLET, FILM COATED ORAL at 23:32

## 2019-01-17 RX ADMIN — METAXALONE 800 MG: 800 TABLET ORAL at 05:10

## 2019-01-17 RX ADMIN — POLYETHYLENE GLYCOL 3350 1 PACKET: 17 POWDER, FOR SOLUTION ORAL at 16:47

## 2019-01-17 RX ADMIN — GABAPENTIN 300 MG: 300 CAPSULE ORAL at 16:47

## 2019-01-17 RX ADMIN — GABAPENTIN 300 MG: 300 CAPSULE ORAL at 05:10

## 2019-01-17 RX ADMIN — LIDOCAINE 1 PATCH: 50 PATCH TOPICAL at 11:41

## 2019-01-17 RX ADMIN — OXYCODONE HYDROCHLORIDE 5 MG: 5 TABLET ORAL at 14:40

## 2019-01-17 RX ADMIN — Medication 1 EACH: at 05:13

## 2019-01-17 RX ADMIN — FAMOTIDINE 20 MG: 20 TABLET ORAL at 05:10

## 2019-01-17 RX ADMIN — OXYCODONE HYDROCHLORIDE 5 MG: 5 TABLET ORAL at 11:41

## 2019-01-17 RX ADMIN — ENOXAPARIN SODIUM 30 MG: 100 INJECTION SUBCUTANEOUS at 05:11

## 2019-01-17 RX ADMIN — SENNOSIDES AND DOCUSATE SODIUM 1 TABLET: 8.6; 5 TABLET ORAL at 19:28

## 2019-01-17 RX ADMIN — MORPHINE SULFATE 15 MG: 15 TABLET, EXTENDED RELEASE ORAL at 05:10

## 2019-01-17 RX ADMIN — METAXALONE 800 MG: 800 TABLET ORAL at 11:41

## 2019-01-17 RX ADMIN — Medication 1 EACH: at 16:47

## 2019-01-17 RX ADMIN — DIVALPROEX SODIUM 125 MG: 125 CAPSULE, COATED PELLETS ORAL at 20:26

## 2019-01-17 RX ADMIN — METAXALONE 800 MG: 800 TABLET ORAL at 16:48

## 2019-01-17 RX ADMIN — NICOTINE 21 MG: 21 PATCH, EXTENDED RELEASE TRANSDERMAL at 05:11

## 2019-01-17 RX ADMIN — OXYCODONE HYDROCHLORIDE 5 MG: 5 TABLET ORAL at 19:28

## 2019-01-17 RX ADMIN — ACETAMINOPHEN 650 MG: 325 TABLET, FILM COATED ORAL at 14:40

## 2019-01-17 RX ADMIN — CELECOXIB 200 MG: 200 CAPSULE ORAL at 05:10

## 2019-01-17 RX ADMIN — ENOXAPARIN SODIUM 30 MG: 100 INJECTION SUBCUTANEOUS at 16:48

## 2019-01-17 RX ADMIN — OXYCODONE HYDROCHLORIDE 5 MG: 5 TABLET ORAL at 02:23

## 2019-01-17 RX ADMIN — DIVALPROEX SODIUM 125 MG: 125 CAPSULE, COATED PELLETS ORAL at 05:15

## 2019-01-17 ASSESSMENT — PAIN SCALES - GENERAL
PAINLEVEL_OUTOF10: 8
PAINLEVEL_OUTOF10: 10
PAINLEVEL_OUTOF10: 8
PAINLEVEL_OUTOF10: 10
PAINLEVEL_OUTOF10: 5

## 2019-01-17 ASSESSMENT — ENCOUNTER SYMPTOMS
FOCAL WEAKNESS: 0
VOMITING: 0
NECK PAIN: 1
MYALGIAS: 1
ABDOMINAL PAIN: 0
PALPITATIONS: 0
DOUBLE VISION: 0
NAUSEA: 0
ROS GI COMMENTS: BM 1/12
SHORTNESS OF BREATH: 0
FEVER: 0

## 2019-01-17 NOTE — THERAPY
Discussed with PTA; pt has met functional goals. Deficits appear to be related to cognition rather than mobility. No further acute PT required.

## 2019-01-17 NOTE — DISCHARGE PLANNING
Agency/Facility Name: [facility]  Outcome: Referral sent to [facility] per Mariangel's(PAMELA) request.

## 2019-01-17 NOTE — PROGRESS NOTES
2 RN skin check.   Skin under C-collar, red but blanching, no breakdown.   Scattered abrasions to bilat hips.

## 2019-01-17 NOTE — CARE PLAN
Problem: Skin Integrity  Goal: Risk for impaired skin integrity will decrease  Outcome: PROGRESSING AS EXPECTED  Skin assessed under devices for breakdown. Pt turns self in bed.     Problem: Mobility  Goal: Risk for activity intolerance will decrease  Outcome: PROGRESSING AS EXPECTED  Fall precautions in place, pt up with SBA
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
## 2019-01-17 NOTE — PROGRESS NOTES
Pt AAOX4, WALSH. Plastic C-collar on at all times. Pain medicated per MAR. 1:1 Sitter at bedside for impulsive behaviors. Up with SBA. Tolerating Dys 2, thin liquid diet.
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
## 2019-01-17 NOTE — PROGRESS NOTES
2 RN skin check complete. No breakdown noted under C-Collar, scattered abrasions to bilateral hips.

## 2019-01-18 PROCEDURE — 700101 HCHG RX REV CODE 250: Performed by: NURSE PRACTITIONER

## 2019-01-18 PROCEDURE — 700111 HCHG RX REV CODE 636 W/ 250 OVERRIDE (IP): Performed by: SURGERY

## 2019-01-18 PROCEDURE — A9270 NON-COVERED ITEM OR SERVICE: HCPCS | Performed by: NURSE PRACTITIONER

## 2019-01-18 PROCEDURE — 700112 HCHG RX REV CODE 229: Performed by: NURSE PRACTITIONER

## 2019-01-18 PROCEDURE — 700102 HCHG RX REV CODE 250 W/ 637 OVERRIDE(OP): Performed by: NURSE PRACTITIONER

## 2019-01-18 PROCEDURE — A9270 NON-COVERED ITEM OR SERVICE: HCPCS | Performed by: STUDENT IN AN ORGANIZED HEALTH CARE EDUCATION/TRAINING PROGRAM

## 2019-01-18 PROCEDURE — 700102 HCHG RX REV CODE 250 W/ 637 OVERRIDE(OP): Performed by: STUDENT IN AN ORGANIZED HEALTH CARE EDUCATION/TRAINING PROGRAM

## 2019-01-18 PROCEDURE — 92526 ORAL FUNCTION THERAPY: CPT

## 2019-01-18 PROCEDURE — 770001 HCHG ROOM/CARE - MED/SURG/GYN PRIV*

## 2019-01-18 PROCEDURE — 700102 HCHG RX REV CODE 250 W/ 637 OVERRIDE(OP): Performed by: PSYCHIATRY & NEUROLOGY

## 2019-01-18 RX ORDER — CALCIUM CARBONATE 500 MG/1
500 TABLET, CHEWABLE ORAL 3 TIMES DAILY PRN
Status: DISCONTINUED | OUTPATIENT
Start: 2019-01-18 | End: 2019-02-13 | Stop reason: HOSPADM

## 2019-01-18 RX ADMIN — MORPHINE SULFATE 15 MG: 15 TABLET, EXTENDED RELEASE ORAL at 05:49

## 2019-01-18 RX ADMIN — DIVALPROEX SODIUM 125 MG: 125 CAPSULE, COATED PELLETS ORAL at 13:19

## 2019-01-18 RX ADMIN — GABAPENTIN 300 MG: 300 CAPSULE ORAL at 17:08

## 2019-01-18 RX ADMIN — OXYCODONE HYDROCHLORIDE 5 MG: 5 TABLET ORAL at 15:17

## 2019-01-18 RX ADMIN — ACETAMINOPHEN 650 MG: 325 TABLET, FILM COATED ORAL at 22:19

## 2019-01-18 RX ADMIN — OXYCODONE HYDROCHLORIDE 5 MG: 5 TABLET ORAL at 19:41

## 2019-01-18 RX ADMIN — FAMOTIDINE 20 MG: 20 TABLET ORAL at 17:08

## 2019-01-18 RX ADMIN — Medication 1 EACH: at 05:51

## 2019-01-18 RX ADMIN — OXYCODONE HYDROCHLORIDE 5 MG: 5 TABLET ORAL at 02:15

## 2019-01-18 RX ADMIN — DIVALPROEX SODIUM 125 MG: 125 CAPSULE, COATED PELLETS ORAL at 05:49

## 2019-01-18 RX ADMIN — DOCUSATE SODIUM 100 MG: 100 CAPSULE, LIQUID FILLED ORAL at 05:49

## 2019-01-18 RX ADMIN — SENNOSIDES AND DOCUSATE SODIUM 1 TABLET: 8.6; 5 TABLET ORAL at 22:19

## 2019-01-18 RX ADMIN — DIVALPROEX SODIUM 125 MG: 125 CAPSULE, COATED PELLETS ORAL at 22:19

## 2019-01-18 RX ADMIN — Medication 1 EACH: at 17:08

## 2019-01-18 RX ADMIN — GABAPENTIN 300 MG: 300 CAPSULE ORAL at 05:49

## 2019-01-18 RX ADMIN — POLYETHYLENE GLYCOL 3350 1 PACKET: 17 POWDER, FOR SOLUTION ORAL at 05:58

## 2019-01-18 RX ADMIN — LIDOCAINE 1 PATCH: 50 PATCH TOPICAL at 13:19

## 2019-01-18 RX ADMIN — GABAPENTIN 300 MG: 300 CAPSULE ORAL at 13:19

## 2019-01-18 RX ADMIN — NICOTINE 21 MG: 21 PATCH, EXTENDED RELEASE TRANSDERMAL at 05:50

## 2019-01-18 RX ADMIN — OXYCODONE HYDROCHLORIDE 5 MG: 5 TABLET ORAL at 07:02

## 2019-01-18 RX ADMIN — CELECOXIB 200 MG: 200 CAPSULE ORAL at 17:08

## 2019-01-18 RX ADMIN — FAMOTIDINE 20 MG: 20 TABLET ORAL at 05:49

## 2019-01-18 RX ADMIN — METAXALONE 800 MG: 800 TABLET ORAL at 05:49

## 2019-01-18 RX ADMIN — ARIPIPRAZOLE 5 MG: 10 TABLET ORAL at 05:49

## 2019-01-18 RX ADMIN — CELECOXIB 200 MG: 200 CAPSULE ORAL at 05:50

## 2019-01-18 RX ADMIN — MORPHINE SULFATE 15 MG: 15 TABLET, EXTENDED RELEASE ORAL at 17:08

## 2019-01-18 RX ADMIN — ENOXAPARIN SODIUM 30 MG: 100 INJECTION SUBCUTANEOUS at 17:08

## 2019-01-18 RX ADMIN — METAXALONE 800 MG: 800 TABLET ORAL at 17:08

## 2019-01-18 RX ADMIN — OXYCODONE HYDROCHLORIDE 5 MG: 5 TABLET ORAL at 23:53

## 2019-01-18 RX ADMIN — ENOXAPARIN SODIUM 30 MG: 100 INJECTION SUBCUTANEOUS at 05:51

## 2019-01-18 RX ADMIN — OXYCODONE HYDROCHLORIDE 5 MG: 5 TABLET ORAL at 11:24

## 2019-01-18 RX ADMIN — METAXALONE 800 MG: 800 TABLET ORAL at 13:19

## 2019-01-18 RX ADMIN — Medication 1 EACH: at 13:19

## 2019-01-18 ASSESSMENT — ENCOUNTER SYMPTOMS
NAUSEA: 0
ABDOMINAL PAIN: 0
DOUBLE VISION: 0
SHORTNESS OF BREATH: 0
FOCAL WEAKNESS: 0
MYALGIAS: 1
ROS GI COMMENTS: BM 1/17
SPEECH CHANGE: 0
NECK PAIN: 1
FEVER: 0
PALPITATIONS: 0

## 2019-01-18 ASSESSMENT — PAIN SCALES - GENERAL
PAINLEVEL_OUTOF10: 9
PAINLEVEL_OUTOF10: 10
PAINLEVEL_OUTOF10: 5
PAINLEVEL_OUTOF10: 9
PAINLEVEL_OUTOF10: 9
PAINLEVEL_OUTOF10: 4
PAINLEVEL_OUTOF10: 9

## 2019-01-18 NOTE — CARE PLAN
Problem: Safety  Goal: Will remain free from falls  Outcome: PROGRESSING AS EXPECTED  1:1 sitter at bedside for safety. Fall precautions in place     Problem: Skin Integrity  Goal: Risk for impaired skin integrity will decrease  Outcome: PROGRESSING AS EXPECTED  Skin assessed for signs of breakdown

## 2019-01-18 NOTE — PROGRESS NOTES
Pt A&Ox4, c/o pain to neck, medicating per MAR. Denies N/T. Pt ambulates self in room. 1:1 sitter at bedside for impulsivity. Plastic C-collar on at all times. Diet downgraded to dysphagia 1 thin liquids, 1:1 supervision, takes pills whole one at a time. Educated on plan of care, all questions answered at this time. Call light within reach, will continue to monitor.

## 2019-01-18 NOTE — DISCHARGE PLANNING
Agency/Facility Name: [facility]  Outcome: Patient declined due to behaviors and care exceeds capacity. Mariangel(PAMELA) notified.
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
## 2019-01-18 NOTE — DISCHARGE PLANNING
Agency/Facility Name: [facility]  Spoke To: Trav(p:[phone])  Outcome: Trav to contact Liaison to verify if referral was received and will call this CCA back.    Agency/Facility Name: [facility]  Spoke To: Rosa Isela(p:[phone])  Outcome: Referral not received. Referral sent to [facility] at fax #:[phone].
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
## 2019-01-18 NOTE — CARE PLAN
Problem: Communication  Goal: The ability to communicate needs accurately and effectively will improve    Intervention: [place] patient and significant other/support system to call light to alert staff of needs  A/OX4, able to make needs known      Problem: Mobility  Goal: Risk for activity intolerance will decrease    Intervention: Encourage patient to increase activity level in collaboration with Interdisciplinary Team  Pt up ad antonio, 1:1 sitter at bedside
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
## 2019-01-19 ENCOUNTER — APPOINTMENT (OUTPATIENT)
Dept: RADIOLOGY | Facility: MEDICAL CENTER | Age: 44
DRG: 963 | End: 2019-01-19
Attending: NEUROLOGICAL SURGERY
Payer: OTHER MISCELLANEOUS

## 2019-01-19 ENCOUNTER — APPOINTMENT (OUTPATIENT)
Dept: RADIOLOGY | Facility: MEDICAL CENTER | Age: 44
DRG: 963 | End: 2019-01-19
Attending: SURGERY
Payer: OTHER MISCELLANEOUS

## 2019-01-19 ENCOUNTER — APPOINTMENT (OUTPATIENT)
Dept: RADIOLOGY | Facility: MEDICAL CENTER | Age: 44
DRG: 963 | End: 2019-01-19
Attending: NURSE PRACTITIONER
Payer: OTHER MISCELLANEOUS

## 2019-01-19 LAB
ALBUMIN SERPL BCP-MCNC: 3.2 G/DL (ref 3.2–4.9)
ALBUMIN/GLOB SERPL: 1.1 G/DL
ALP SERPL-CCNC: 77 U/L (ref 30–99)
ALT SERPL-CCNC: 40 U/L (ref 2–50)
ANION GAP SERPL CALC-SCNC: 8 MMOL/L (ref 0–11.9)
AST SERPL-CCNC: 17 U/L (ref 12–45)
BASOPHILS # BLD AUTO: 0.5 % (ref 0–1.8)
BASOPHILS # BLD: 0.07 K/UL (ref 0–0.12)
BILIRUB SERPL-MCNC: 0.2 MG/DL (ref 0.1–1.5)
BUN SERPL-MCNC: 36 MG/DL (ref 8–22)
CALCIUM SERPL-MCNC: 8.8 MG/DL (ref 8.5–10.5)
CHLORIDE SERPL-SCNC: 102 MMOL/L (ref 96–112)
CO2 SERPL-SCNC: 26 MMOL/L (ref 20–33)
CREAT SERPL-MCNC: 0.87 MG/DL (ref 0.5–1.4)
EKG IMPRESSION: NORMAL
EOSINOPHIL # BLD AUTO: 0.2 K/UL (ref 0–0.51)
EOSINOPHIL NFR BLD: 1.5 % (ref 0–6.9)
ERYTHROCYTE [DISTWIDTH] IN BLOOD BY AUTOMATED COUNT: 45.1 FL (ref 35.9–50)
GLOBULIN SER CALC-MCNC: 2.9 G/DL (ref 1.9–3.5)
GLUCOSE SERPL-MCNC: 95 MG/DL (ref 65–99)
HCT VFR BLD AUTO: 38 % (ref 42–52)
HGB BLD-MCNC: 12.3 G/DL (ref 14–18)
IMM GRANULOCYTES # BLD AUTO: 0.29 K/UL (ref 0–0.11)
IMM GRANULOCYTES NFR BLD AUTO: 2.1 % (ref 0–0.9)
LYMPHOCYTES # BLD AUTO: 3.37 K/UL (ref 1–4.8)
LYMPHOCYTES NFR BLD: 24.8 % (ref 22–41)
MCH RBC QN AUTO: 29.7 PG (ref 27–33)
MCHC RBC AUTO-ENTMCNC: 32.4 G/DL (ref 33.7–35.3)
MCV RBC AUTO: 91.8 FL (ref 81.4–97.8)
MONOCYTES # BLD AUTO: 0.79 K/UL (ref 0–0.85)
MONOCYTES NFR BLD AUTO: 5.8 % (ref 0–13.4)
NEUTROPHILS # BLD AUTO: 8.89 K/UL (ref 1.82–7.42)
NEUTROPHILS NFR BLD: 65.3 % (ref 44–72)
NRBC # BLD AUTO: 0 K/UL
NRBC BLD-RTO: 0 /100 WBC
PLATELET # BLD AUTO: 469 K/UL (ref 164–446)
PMV BLD AUTO: 8.5 FL (ref 9–12.9)
POTASSIUM SERPL-SCNC: 4.8 MMOL/L (ref 3.6–5.5)
PROT SERPL-MCNC: 6.1 G/DL (ref 6–8.2)
RBC # BLD AUTO: 4.14 M/UL (ref 4.7–6.1)
SODIUM SERPL-SCNC: 136 MMOL/L (ref 135–145)
TROPONIN I SERPL-MCNC: <0.01 NG/ML (ref 0–0.04)
WBC # BLD AUTO: 13.6 K/UL (ref 4.8–10.8)

## 2019-01-19 PROCEDURE — 700102 HCHG RX REV CODE 250 W/ 637 OVERRIDE(OP): Performed by: PSYCHIATRY & NEUROLOGY

## 2019-01-19 PROCEDURE — 700102 HCHG RX REV CODE 250 W/ 637 OVERRIDE(OP): Performed by: NURSE PRACTITIONER

## 2019-01-19 PROCEDURE — 770001 HCHG ROOM/CARE - MED/SURG/GYN PRIV*

## 2019-01-19 PROCEDURE — A9270 NON-COVERED ITEM OR SERVICE: HCPCS | Performed by: NURSE PRACTITIONER

## 2019-01-19 PROCEDURE — 700111 HCHG RX REV CODE 636 W/ 250 OVERRIDE (IP): Performed by: SURGERY

## 2019-01-19 PROCEDURE — 36415 COLL VENOUS BLD VENIPUNCTURE: CPT

## 2019-01-19 PROCEDURE — 71275 CT ANGIOGRAPHY CHEST: CPT

## 2019-01-19 PROCEDURE — 700101 HCHG RX REV CODE 250: Performed by: NURSE PRACTITIONER

## 2019-01-19 PROCEDURE — 93005 ELECTROCARDIOGRAM TRACING: CPT | Performed by: SURGERY

## 2019-01-19 PROCEDURE — 84484 ASSAY OF TROPONIN QUANT: CPT

## 2019-01-19 PROCEDURE — 72040 X-RAY EXAM NECK SPINE 2-3 VW: CPT

## 2019-01-19 PROCEDURE — 93010 ELECTROCARDIOGRAM REPORT: CPT | Performed by: INTERNAL MEDICINE

## 2019-01-19 PROCEDURE — 72125 CT NECK SPINE W/O DYE: CPT

## 2019-01-19 PROCEDURE — A9270 NON-COVERED ITEM OR SERVICE: HCPCS | Performed by: STUDENT IN AN ORGANIZED HEALTH CARE EDUCATION/TRAINING PROGRAM

## 2019-01-19 PROCEDURE — 700117 HCHG RX CONTRAST REV CODE 255: Performed by: NURSE PRACTITIONER

## 2019-01-19 PROCEDURE — 700102 HCHG RX REV CODE 250 W/ 637 OVERRIDE(OP): Performed by: STUDENT IN AN ORGANIZED HEALTH CARE EDUCATION/TRAINING PROGRAM

## 2019-01-19 PROCEDURE — 80053 COMPREHEN METABOLIC PANEL: CPT

## 2019-01-19 PROCEDURE — 71045 X-RAY EXAM CHEST 1 VIEW: CPT

## 2019-01-19 PROCEDURE — 700112 HCHG RX REV CODE 229: Performed by: NURSE PRACTITIONER

## 2019-01-19 PROCEDURE — 700111 HCHG RX REV CODE 636 W/ 250 OVERRIDE (IP): Performed by: NURSE PRACTITIONER

## 2019-01-19 PROCEDURE — 85025 COMPLETE CBC W/AUTO DIFF WBC: CPT

## 2019-01-19 RX ORDER — MORPHINE SULFATE 4 MG/ML
3 INJECTION, SOLUTION INTRAMUSCULAR; INTRAVENOUS ONCE
Status: COMPLETED | OUTPATIENT
Start: 2019-01-19 | End: 2019-01-19

## 2019-01-19 RX ORDER — HALOPERIDOL 5 MG/ML
5 INJECTION INTRAMUSCULAR EVERY 6 HOURS PRN
Status: DISCONTINUED | OUTPATIENT
Start: 2019-01-19 | End: 2019-01-22

## 2019-01-19 RX ADMIN — MORPHINE SULFATE 3 MG: 4 INJECTION INTRAVENOUS at 11:43

## 2019-01-19 RX ADMIN — DIVALPROEX SODIUM 125 MG: 125 CAPSULE, COATED PELLETS ORAL at 05:49

## 2019-01-19 RX ADMIN — OXYCODONE HYDROCHLORIDE 5 MG: 5 TABLET ORAL at 08:22

## 2019-01-19 RX ADMIN — HALOPERIDOL LACTATE 5 MG: 5 INJECTION, SOLUTION INTRAMUSCULAR at 17:40

## 2019-01-19 RX ADMIN — ACETAMINOPHEN 650 MG: 325 TABLET, FILM COATED ORAL at 10:09

## 2019-01-19 RX ADMIN — FAMOTIDINE 20 MG: 20 TABLET ORAL at 05:50

## 2019-01-19 RX ADMIN — GABAPENTIN 300 MG: 300 CAPSULE ORAL at 12:08

## 2019-01-19 RX ADMIN — OXYCODONE HYDROCHLORIDE 5 MG: 5 TABLET ORAL at 21:49

## 2019-01-19 RX ADMIN — METAXALONE 800 MG: 800 TABLET ORAL at 17:12

## 2019-01-19 RX ADMIN — ENOXAPARIN SODIUM 30 MG: 100 INJECTION SUBCUTANEOUS at 05:51

## 2019-01-19 RX ADMIN — CELECOXIB 200 MG: 200 CAPSULE ORAL at 05:50

## 2019-01-19 RX ADMIN — ACETAMINOPHEN 650 MG: 325 TABLET, FILM COATED ORAL at 18:23

## 2019-01-19 RX ADMIN — OXYCODONE HYDROCHLORIDE 5 MG: 5 TABLET ORAL at 02:59

## 2019-01-19 RX ADMIN — DIVALPROEX SODIUM 125 MG: 125 CAPSULE, COATED PELLETS ORAL at 14:30

## 2019-01-19 RX ADMIN — OXYCODONE HYDROCHLORIDE 5 MG: 5 TABLET ORAL at 11:28

## 2019-01-19 RX ADMIN — FAMOTIDINE 20 MG: 20 TABLET ORAL at 17:12

## 2019-01-19 RX ADMIN — NICOTINE 21 MG: 21 PATCH, EXTENDED RELEASE TRANSDERMAL at 05:53

## 2019-01-19 RX ADMIN — GABAPENTIN 300 MG: 300 CAPSULE ORAL at 17:12

## 2019-01-19 RX ADMIN — Medication 1 EACH: at 17:12

## 2019-01-19 RX ADMIN — METAXALONE 800 MG: 800 TABLET ORAL at 12:08

## 2019-01-19 RX ADMIN — MORPHINE SULFATE 15 MG: 15 TABLET, EXTENDED RELEASE ORAL at 05:49

## 2019-01-19 RX ADMIN — MORPHINE SULFATE 15 MG: 15 TABLET, EXTENDED RELEASE ORAL at 17:12

## 2019-01-19 RX ADMIN — SENNOSIDES AND DOCUSATE SODIUM 1 TABLET: 8.6; 5 TABLET ORAL at 21:49

## 2019-01-19 RX ADMIN — LIDOCAINE 1 PATCH: 50 PATCH TOPICAL at 12:08

## 2019-01-19 RX ADMIN — OXYCODONE HYDROCHLORIDE 5 MG: 5 TABLET ORAL at 18:23

## 2019-01-19 RX ADMIN — ARIPIPRAZOLE 5 MG: 10 TABLET ORAL at 05:50

## 2019-01-19 RX ADMIN — ANTACID TABLETS 500 MG: 500 TABLET, CHEWABLE ORAL at 11:28

## 2019-01-19 RX ADMIN — POLYETHYLENE GLYCOL 3350 1 PACKET: 17 POWDER, FOR SOLUTION ORAL at 05:54

## 2019-01-19 RX ADMIN — ENOXAPARIN SODIUM 30 MG: 100 INJECTION SUBCUTANEOUS at 17:12

## 2019-01-19 RX ADMIN — Medication 1 EACH: at 12:08

## 2019-01-19 RX ADMIN — IOHEXOL 80 ML: 350 INJECTION, SOLUTION INTRAVENOUS at 14:18

## 2019-01-19 RX ADMIN — DOCUSATE SODIUM 100 MG: 100 CAPSULE, LIQUID FILLED ORAL at 05:50

## 2019-01-19 RX ADMIN — Medication 1 EACH: at 05:53

## 2019-01-19 RX ADMIN — GABAPENTIN 300 MG: 300 CAPSULE ORAL at 05:50

## 2019-01-19 RX ADMIN — CELECOXIB 200 MG: 200 CAPSULE ORAL at 17:12

## 2019-01-19 RX ADMIN — METAXALONE 800 MG: 800 TABLET ORAL at 05:49

## 2019-01-19 RX ADMIN — DIVALPROEX SODIUM 125 MG: 125 CAPSULE, COATED PELLETS ORAL at 21:49

## 2019-01-19 ASSESSMENT — ENCOUNTER SYMPTOMS
ABDOMINAL PAIN: 0
MYALGIAS: 1
PALPITATIONS: 0
NECK PAIN: 1
DOUBLE VISION: 0
SHORTNESS OF BREATH: 0
FEVER: 0
ROS GI COMMENTS: BM 1/17
FOCAL WEAKNESS: 0
SPEECH CHANGE: 0
NAUSEA: 0

## 2019-01-19 ASSESSMENT — PATIENT HEALTH QUESTIONNAIRE - PHQ9
2. FEELING DOWN, DEPRESSED, IRRITABLE, OR HOPELESS: NOT AT ALL
1. LITTLE INTEREST OR PLEASURE IN DOING THINGS: NOT AT ALL
SUM OF ALL RESPONSES TO PHQ9 QUESTIONS 1 AND 2: 0

## 2019-01-19 ASSESSMENT — PAIN SCALES - GENERAL
PAINLEVEL_OUTOF10: 10
PAINLEVEL_OUTOF10: 0
PAINLEVEL_OUTOF10: 9
PAINLEVEL_OUTOF10: 3
PAINLEVEL_OUTOF10: 0
PAINLEVEL_OUTOF10: 10

## 2019-01-19 ASSESSMENT — PAIN SCALES - WONG BAKER: WONGBAKER_NUMERICALRESPONSE: DOESN'T HURT AT ALL

## 2019-01-19 ASSESSMENT — LIFESTYLE VARIABLES: SUBSTANCE_ABUSE: 1
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
## 2019-01-19 NOTE — PROGRESS NOTES
Assumed care of pt. RE/ox4. 1:1 sitter at bedside. Complains of neck pain, medicated per MAR. Dys 1 thin liquids, 1:1 feeder. PIVs flushed and saline locked. C- collar on at all times. POC Ca rehab. Call light in reach, bed in low position, will continue hourly rounding.

## 2019-01-19 NOTE — CARE PLAN
Problem: Safety  Goal: Will remain free from falls    Intervention: Assess risk factors for falls  Steady gait, impulsive. 1:1 sitter at bedside      Problem: Pain Management  Goal: Pain level will decrease to patient's comfort goal    Intervention: Follow pain managment plan developed in collaboration with patient and Interdisciplinary Team  Pain controlled on current regimen
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
## 2019-01-19 NOTE — CODE DOCUMENTATION
"Tums and oxy given.  Patient states the pain \"inside\" his chest is gone but the pain on the \"oustide\" is gone.  8/10 pain  "

## 2019-01-19 NOTE — PROGRESS NOTES
2 RN skin check complete with BRAULIO Fall. Skin blanching red under C-Collar. Scattered abrasions to bilateral hips.

## 2019-01-19 NOTE — PROGRESS NOTES
"Blood pressure 115/83, pulse 84, temperature 36.5 °C (97.7 °F), resp. rate 20, height 1.727 m (5' 8\"), weight 57.8 kg (127 lb 6.8 oz), SpO2 99 %.      Pt sitting in bed eating, no more pain.   O2 off, sat at 99%     Will follow up on CTA and troponin.   "

## 2019-01-20 ENCOUNTER — APPOINTMENT (OUTPATIENT)
Dept: RADIOLOGY | Facility: MEDICAL CENTER | Age: 44
DRG: 963 | End: 2019-01-20
Attending: PHYSICIAN ASSISTANT
Payer: OTHER MISCELLANEOUS

## 2019-01-20 PROCEDURE — 770001 HCHG ROOM/CARE - MED/SURG/GYN PRIV*

## 2019-01-20 PROCEDURE — 700102 HCHG RX REV CODE 250 W/ 637 OVERRIDE(OP): Performed by: PSYCHIATRY & NEUROLOGY

## 2019-01-20 PROCEDURE — 700111 HCHG RX REV CODE 636 W/ 250 OVERRIDE (IP): Performed by: SURGERY

## 2019-01-20 PROCEDURE — A9270 NON-COVERED ITEM OR SERVICE: HCPCS | Performed by: NURSE PRACTITIONER

## 2019-01-20 PROCEDURE — 700111 HCHG RX REV CODE 636 W/ 250 OVERRIDE (IP): Performed by: NURSE PRACTITIONER

## 2019-01-20 PROCEDURE — 700102 HCHG RX REV CODE 250 W/ 637 OVERRIDE(OP): Performed by: NURSE PRACTITIONER

## 2019-01-20 PROCEDURE — 700102 HCHG RX REV CODE 250 W/ 637 OVERRIDE(OP): Performed by: STUDENT IN AN ORGANIZED HEALTH CARE EDUCATION/TRAINING PROGRAM

## 2019-01-20 PROCEDURE — 700101 HCHG RX REV CODE 250: Performed by: NURSE PRACTITIONER

## 2019-01-20 PROCEDURE — A9270 NON-COVERED ITEM OR SERVICE: HCPCS | Performed by: STUDENT IN AN ORGANIZED HEALTH CARE EDUCATION/TRAINING PROGRAM

## 2019-01-20 PROCEDURE — 700112 HCHG RX REV CODE 229: Performed by: NURSE PRACTITIONER

## 2019-01-20 RX ADMIN — GABAPENTIN 300 MG: 300 CAPSULE ORAL at 11:59

## 2019-01-20 RX ADMIN — FAMOTIDINE 20 MG: 20 TABLET ORAL at 05:33

## 2019-01-20 RX ADMIN — METAXALONE 800 MG: 800 TABLET ORAL at 16:21

## 2019-01-20 RX ADMIN — ENOXAPARIN SODIUM 30 MG: 100 INJECTION SUBCUTANEOUS at 05:31

## 2019-01-20 RX ADMIN — Medication 1 EACH: at 05:32

## 2019-01-20 RX ADMIN — ENOXAPARIN SODIUM 30 MG: 100 INJECTION SUBCUTANEOUS at 16:22

## 2019-01-20 RX ADMIN — MORPHINE SULFATE 15 MG: 15 TABLET, EXTENDED RELEASE ORAL at 05:33

## 2019-01-20 RX ADMIN — OXYCODONE HYDROCHLORIDE 5 MG: 5 TABLET ORAL at 09:19

## 2019-01-20 RX ADMIN — OXYCODONE HYDROCHLORIDE 5 MG: 5 TABLET ORAL at 16:25

## 2019-01-20 RX ADMIN — DOCUSATE SODIUM 100 MG: 100 CAPSULE, LIQUID FILLED ORAL at 05:33

## 2019-01-20 RX ADMIN — GABAPENTIN 300 MG: 300 CAPSULE ORAL at 05:31

## 2019-01-20 RX ADMIN — LIDOCAINE 1 PATCH: 50 PATCH TOPICAL at 11:59

## 2019-01-20 RX ADMIN — METAXALONE 800 MG: 800 TABLET ORAL at 11:59

## 2019-01-20 RX ADMIN — HALOPERIDOL LACTATE 5 MG: 5 INJECTION, SOLUTION INTRAMUSCULAR at 20:36

## 2019-01-20 RX ADMIN — GABAPENTIN 300 MG: 300 CAPSULE ORAL at 16:21

## 2019-01-20 RX ADMIN — DIVALPROEX SODIUM 125 MG: 125 CAPSULE, COATED PELLETS ORAL at 05:33

## 2019-01-20 RX ADMIN — NICOTINE 21 MG: 21 PATCH, EXTENDED RELEASE TRANSDERMAL at 05:32

## 2019-01-20 RX ADMIN — DIVALPROEX SODIUM 125 MG: 125 CAPSULE, COATED PELLETS ORAL at 14:44

## 2019-01-20 RX ADMIN — OXYCODONE HYDROCHLORIDE 5 MG: 5 TABLET ORAL at 19:30

## 2019-01-20 RX ADMIN — METAXALONE 800 MG: 800 TABLET ORAL at 05:31

## 2019-01-20 RX ADMIN — ACETAMINOPHEN 650 MG: 325 TABLET, FILM COATED ORAL at 10:45

## 2019-01-20 RX ADMIN — CELECOXIB 200 MG: 200 CAPSULE ORAL at 05:32

## 2019-01-20 RX ADMIN — OXYCODONE HYDROCHLORIDE 5 MG: 5 TABLET ORAL at 13:10

## 2019-01-20 RX ADMIN — MORPHINE SULFATE 15 MG: 15 TABLET, EXTENDED RELEASE ORAL at 16:21

## 2019-01-20 RX ADMIN — ARIPIPRAZOLE 5 MG: 10 TABLET ORAL at 05:32

## 2019-01-20 RX ADMIN — FAMOTIDINE 20 MG: 20 TABLET ORAL at 16:21

## 2019-01-20 RX ADMIN — DIVALPROEX SODIUM 125 MG: 125 CAPSULE, COATED PELLETS ORAL at 21:16

## 2019-01-20 ASSESSMENT — ENCOUNTER SYMPTOMS
FEVER: 0
FOCAL WEAKNESS: 0
NERVOUS/ANXIOUS: 1
NAUSEA: 0
ABDOMINAL PAIN: 0
MYALGIAS: 1
PALPITATIONS: 0
NECK PAIN: 1
SHORTNESS OF BREATH: 0
ROS GI COMMENTS: BM 1/17
SPEECH CHANGE: 0
DOUBLE VISION: 0

## 2019-01-20 ASSESSMENT — PAIN SCALES - WONG BAKER: WONGBAKER_NUMERICALRESPONSE: DOESN'T HURT AT ALL

## 2019-01-20 ASSESSMENT — PAIN SCALES - GENERAL
PAINLEVEL_OUTOF10: 0
PAINLEVEL_OUTOF10: 9
PAINLEVEL_OUTOF10: 0
PAINLEVEL_OUTOF10: 9

## 2019-01-20 ASSESSMENT — LIFESTYLE VARIABLES: SUBSTANCE_ABUSE: 1

## 2019-01-20 NOTE — CARE PLAN
Problem: Safety  Goal: Will remain free from falls    Intervention: Assess risk factors for falls  1:1 sitter at bedside       Problem: Pain Management  Goal: Pain level will decrease to patient's comfort goal    Intervention: Follow pain managment plan developed in collaboration with patient and Interdisciplinary Team  Pain controlled on regimen

## 2019-01-20 NOTE — ASSESSMENT & PLAN NOTE
1/14 Psych consultation. Abilify initiated 5 mg daily.  1/16 Adding depakote 125mg po tid for pain/ mood stabilization/ impulsivity.  1/22  DC depakote and abilify and switch to zyprexa (to start 1/23). Recommend ativan and not haldol.  1/28 Increasing nightly Zyprexa and nightly prn Zyprexa. Adding Trazodone at night. Weaning IV ativan.  2/1 Increase agitation, d/c ativan, starting klonopin .5mg tid, Zyprexa and gabapentin increased, continue trazodone per psychiatry  Mini Noguera MD. Psychiatry.

## 2019-01-20 NOTE — PROGRESS NOTES
"Blood pressure (!) 97/66, pulse 95, temperature 36.9 °C (98.5 °F), temperature source Temporal, resp. rate 17, height 1.727 m (5' 8\"), weight 57.8 kg (127 lb 6.8 oz), SpO2 99 %.    Notified by nursing of patient's fall in the bathroom. Unwitnessed. Sitter was waiting in patient's room   Patient reports he missed the toiled and landed on his hands . Denies hitting his head. Reports he heard a pop on the right side of his neck. He was wearing his Aspen C collar at the time of the fall.    Patient seen and assessed. Resting in bed complaining of neck pain. Increased pain on the right side of neck since the fall. Nontender. Patient moving all extremities without any acute changes in strength. Noting tingling in bilateral fingers which is unchanged from prior to fall.   Patient's mental status at baseline. Pleasant, oriented. Conversational. Slurry speech at baseline.  No new signs of trauma of head noted, arms and legs also without acute fall related injury.      RN paged to notify neurosurgery  Continue sitter at bedside. Supervised bathroom privileges.    "

## 2019-01-20 NOTE — PROGRESS NOTES
Assumed care of pt. Able to make needs known. A/Ox4. 1:1 sitter at bedside d/t impulsiveness. C-collar on at all times. Dys 1 thins, meds whole. Bed alarm on. CT head pending per NSX d/t unwitnessed fall last night. POC CA rehab. Call light in reach, bed in low position, will continue hourly rounding.

## 2019-01-20 NOTE — PROGRESS NOTES
"Blood pressure 115/83, pulse 84, temperature 36.5 °C (97.7 °F), resp. rate 20, height 1.727 m (5' 8\"), weight 57.8 kg (127 lb 6.8 oz), SpO2 99 %.      Negative work up for PE/Trops negative  WBC elevated with follow  No further chest pain.   Sats 99% RA  "
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
## 2019-01-20 NOTE — PROGRESS NOTES
Unwitnessed ground level fall onto hands and knees in bathroom. Patient wearing C-collar at all times. Reports no injury. [place] pop in neck. Trauma ARNP at bedside assessing patient. Instructed to inform neurosurgery. Call out to Dr. Coleman service for updates and orders.     Pt off floor for X-ray and CT. Order from Dr. Coleman due to fall.     X-ray and CT no new findings. Patient back in room sleeping comfortably in bed. Safety sitter at bedside.
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
## 2019-01-20 NOTE — PROGRESS NOTES
"Patient asking to eat solid foods, explained diet and SLP recommendations. Patient adamant about not \"eating baby food\". Explained risks. Security called to round on patient. 2 minutes later, patient up and attempting to leave. Security was called to assist as patient cannot leave. Called APN for updates and orders, received verbal for haldol IV, see mar. Administered med, patient apologizing for behavior. Asking for dinner tray.   Patient added to security hourly rounding list.   "

## 2019-01-20 NOTE — CARE PLAN
Problem: Bowel/Gastric:  Goal: Normal bowel function is maintained or improved  Outcome: MET Date Met: 01/19/19

## 2019-01-20 NOTE — PROGRESS NOTES
Rapid called for chest pain. Tums and Oxy given to patient while rapid team in room, since previous episode resolved with GI cocktail. APN ordered EKG, trops, CT chest to r/o PE. All workup negative.
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
## 2019-01-21 PROCEDURE — 700101 HCHG RX REV CODE 250: Performed by: NURSE PRACTITIONER

## 2019-01-21 PROCEDURE — A9270 NON-COVERED ITEM OR SERVICE: HCPCS | Performed by: STUDENT IN AN ORGANIZED HEALTH CARE EDUCATION/TRAINING PROGRAM

## 2019-01-21 PROCEDURE — A9270 NON-COVERED ITEM OR SERVICE: HCPCS | Performed by: NURSE PRACTITIONER

## 2019-01-21 PROCEDURE — 700102 HCHG RX REV CODE 250 W/ 637 OVERRIDE(OP): Performed by: NURSE PRACTITIONER

## 2019-01-21 PROCEDURE — 700102 HCHG RX REV CODE 250 W/ 637 OVERRIDE(OP): Performed by: STUDENT IN AN ORGANIZED HEALTH CARE EDUCATION/TRAINING PROGRAM

## 2019-01-21 PROCEDURE — A9270 NON-COVERED ITEM OR SERVICE: HCPCS | Performed by: SURGERY

## 2019-01-21 PROCEDURE — 700111 HCHG RX REV CODE 636 W/ 250 OVERRIDE (IP): Performed by: NURSE PRACTITIONER

## 2019-01-21 PROCEDURE — 700102 HCHG RX REV CODE 250 W/ 637 OVERRIDE(OP): Performed by: PSYCHIATRY & NEUROLOGY

## 2019-01-21 PROCEDURE — 700102 HCHG RX REV CODE 250 W/ 637 OVERRIDE(OP): Performed by: SURGERY

## 2019-01-21 PROCEDURE — 700111 HCHG RX REV CODE 636 W/ 250 OVERRIDE (IP): Performed by: SURGERY

## 2019-01-21 PROCEDURE — 770001 HCHG ROOM/CARE - MED/SURG/GYN PRIV*

## 2019-01-21 PROCEDURE — 99232 SBSQ HOSP IP/OBS MODERATE 35: CPT | Performed by: PSYCHIATRY & NEUROLOGY

## 2019-01-21 RX ORDER — OXYCODONE HYDROCHLORIDE 5 MG/1
5 TABLET ORAL EVERY 4 HOURS PRN
Status: DISCONTINUED | OUTPATIENT
Start: 2019-01-21 | End: 2019-01-24

## 2019-01-21 RX ORDER — MORPHINE SULFATE 15 MG/1
15 TABLET, FILM COATED, EXTENDED RELEASE ORAL EVERY 24 HOURS
Status: DISCONTINUED | OUTPATIENT
Start: 2019-01-22 | End: 2019-01-22

## 2019-01-21 RX ORDER — ALPRAZOLAM 0.25 MG/1
0.25 TABLET ORAL ONCE
Status: COMPLETED | OUTPATIENT
Start: 2019-01-22 | End: 2019-01-22

## 2019-01-21 RX ADMIN — HALOPERIDOL LACTATE 5 MG: 5 INJECTION, SOLUTION INTRAMUSCULAR at 08:28

## 2019-01-21 RX ADMIN — MORPHINE SULFATE 15 MG: 15 TABLET, EXTENDED RELEASE ORAL at 05:45

## 2019-01-21 RX ADMIN — OXYCODONE HYDROCHLORIDE 5 MG: 5 TABLET ORAL at 03:02

## 2019-01-21 RX ADMIN — METAXALONE 800 MG: 800 TABLET ORAL at 05:46

## 2019-01-21 RX ADMIN — HALOPERIDOL LACTATE 5 MG: 5 INJECTION, SOLUTION INTRAMUSCULAR at 20:28

## 2019-01-21 RX ADMIN — OXYCODONE HYDROCHLORIDE 5 MG: 5 TABLET ORAL at 06:01

## 2019-01-21 RX ADMIN — FAMOTIDINE 20 MG: 20 TABLET ORAL at 05:46

## 2019-01-21 RX ADMIN — ARIPIPRAZOLE 5 MG: 10 TABLET ORAL at 05:46

## 2019-01-21 RX ADMIN — ENOXAPARIN SODIUM 30 MG: 100 INJECTION SUBCUTANEOUS at 05:46

## 2019-01-21 RX ADMIN — GABAPENTIN 300 MG: 300 CAPSULE ORAL at 16:59

## 2019-01-21 RX ADMIN — NICOTINE 21 MG: 21 PATCH, EXTENDED RELEASE TRANSDERMAL at 05:46

## 2019-01-21 RX ADMIN — OXYCODONE HYDROCHLORIDE 5 MG: 5 TABLET ORAL at 22:18

## 2019-01-21 RX ADMIN — FAMOTIDINE 20 MG: 20 TABLET ORAL at 16:59

## 2019-01-21 RX ADMIN — GABAPENTIN 300 MG: 300 CAPSULE ORAL at 05:46

## 2019-01-21 RX ADMIN — DIVALPROEX SODIUM 125 MG: 125 CAPSULE, COATED PELLETS ORAL at 13:06

## 2019-01-21 RX ADMIN — METAXALONE 800 MG: 800 TABLET ORAL at 11:41

## 2019-01-21 RX ADMIN — METAXALONE 800 MG: 800 TABLET ORAL at 16:59

## 2019-01-21 RX ADMIN — GABAPENTIN 300 MG: 300 CAPSULE ORAL at 11:41

## 2019-01-21 RX ADMIN — LIDOCAINE 1 PATCH: 50 PATCH TOPICAL at 11:41

## 2019-01-21 RX ADMIN — ENOXAPARIN SODIUM 30 MG: 100 INJECTION SUBCUTANEOUS at 16:59

## 2019-01-21 RX ADMIN — DIVALPROEX SODIUM 125 MG: 125 CAPSULE, COATED PELLETS ORAL at 22:19

## 2019-01-21 RX ADMIN — OXYCODONE HYDROCHLORIDE 5 MG: 5 TABLET ORAL at 16:59

## 2019-01-21 RX ADMIN — OXYCODONE HYDROCHLORIDE 5 MG: 5 TABLET ORAL at 11:41

## 2019-01-21 RX ADMIN — DIVALPROEX SODIUM 125 MG: 125 CAPSULE, COATED PELLETS ORAL at 05:46

## 2019-01-21 ASSESSMENT — PAIN SCALES - GENERAL
PAINLEVEL_OUTOF10: 9
PAINLEVEL_OUTOF10: 10
PAINLEVEL_OUTOF10: 10
PAINLEVEL_OUTOF10: 9
PAINLEVEL_OUTOF10: 8
PAINLEVEL_OUTOF10: 7

## 2019-01-21 ASSESSMENT — ENCOUNTER SYMPTOMS: ROS GI COMMENTS: BM 1/17
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
## 2019-01-21 NOTE — CARE PLAN
Problem: Safety  Goal: Will remain free from falls  Outcome: PROGRESSING AS EXPECTED  Fall precautions and interventions in place. Pt educated on fall precautions and to ring call bell for assistance. Sitter at bedside.    Problem: Pain Management  Goal: Pain level will decrease to patient's comfort goal  Outcome: PROGRESSING AS EXPECTED  Pt medicated for pain per orders. Pt educated on pain management and interventions available.

## 2019-01-21 NOTE — DISCHARGE PLANNING
Anticipated Discharge Disposition:   Home with wife vs [facility]  Action:    -RN MARTHA telephone # sent to [phone] for return call.  -CCA asked to f/u rehab facility    Barriers to Discharge:    Family support and placement    Plan:   [facility]

## 2019-01-21 NOTE — PROGRESS NOTES
2 RN skin check completed due to presence of medical device. C-collar on at all times per order. Kin underneath is reddened but blanching. Padding intact.

## 2019-01-21 NOTE — PROGRESS NOTES
Patient reports feeling very anxious and jittery tonight. Has walked around the unit several times with the sitter. 5 mg haldol given. 1:1 supervision at bedside for safety.

## 2019-01-21 NOTE — PROGRESS NOTES
Patient slept well after haldol given last night. Did get up to use the bathroom several times and asked for pain medication at 0300. Otherwise had a very restful night. 1:1 supervision remains at bedside and is currently walking halls with the patient. Progressing slowly towards goals. Will continue to monitor.

## 2019-01-21 NOTE — CARE PLAN
Problem: Communication  Goal: The ability to communicate needs accurately and effectively will improve  Outcome: PROGRESSING AS EXPECTED      Problem: Safety  Goal: Will remain free from injury  Outcome: PROGRESSING AS EXPECTED      Problem: Pain Management  Goal: Pain level will decrease to patient's comfort goal  Outcome: PROGRESSING AS EXPECTED      Problem: Mobility  Goal: Risk for activity intolerance will decrease  Outcome: PROGRESSING AS EXPECTED

## 2019-01-21 NOTE — DISCHARGE PLANNING
Agency/Facility Name: [facility]  Outcome: Attempted to follow up with Trav(p:[phone]), however, no answer. Unable to leave voicemail as voicemail box is full. Will continue to follow up.

## 2019-01-22 PROCEDURE — 770006 HCHG ROOM/CARE - MED/SURG/GYN SEMI*

## 2019-01-22 PROCEDURE — A9270 NON-COVERED ITEM OR SERVICE: HCPCS | Performed by: NURSE PRACTITIONER

## 2019-01-22 PROCEDURE — 700101 HCHG RX REV CODE 250: Performed by: NURSE PRACTITIONER

## 2019-01-22 PROCEDURE — 700102 HCHG RX REV CODE 250 W/ 637 OVERRIDE(OP): Performed by: NURSE PRACTITIONER

## 2019-01-22 PROCEDURE — 700102 HCHG RX REV CODE 250 W/ 637 OVERRIDE(OP): Performed by: PSYCHIATRY & NEUROLOGY

## 2019-01-22 PROCEDURE — 700102 HCHG RX REV CODE 250 W/ 637 OVERRIDE(OP): Performed by: STUDENT IN AN ORGANIZED HEALTH CARE EDUCATION/TRAINING PROGRAM

## 2019-01-22 PROCEDURE — 99233 SBSQ HOSP IP/OBS HIGH 50: CPT | Performed by: PSYCHIATRY & NEUROLOGY

## 2019-01-22 PROCEDURE — A9270 NON-COVERED ITEM OR SERVICE: HCPCS | Performed by: PSYCHIATRY & NEUROLOGY

## 2019-01-22 PROCEDURE — A9270 NON-COVERED ITEM OR SERVICE: HCPCS | Performed by: SURGERY

## 2019-01-22 PROCEDURE — 700111 HCHG RX REV CODE 636 W/ 250 OVERRIDE (IP): Performed by: NURSE PRACTITIONER

## 2019-01-22 PROCEDURE — 700102 HCHG RX REV CODE 250 W/ 637 OVERRIDE(OP): Performed by: SURGERY

## 2019-01-22 PROCEDURE — A9270 NON-COVERED ITEM OR SERVICE: HCPCS | Performed by: STUDENT IN AN ORGANIZED HEALTH CARE EDUCATION/TRAINING PROGRAM

## 2019-01-22 PROCEDURE — 700111 HCHG RX REV CODE 636 W/ 250 OVERRIDE (IP): Performed by: SURGERY

## 2019-01-22 RX ORDER — LORAZEPAM 1 MG/1
2 TABLET ORAL EVERY 4 HOURS PRN
Status: DISCONTINUED | OUTPATIENT
Start: 2019-01-22 | End: 2019-01-24

## 2019-01-22 RX ORDER — LORAZEPAM 2 MG/ML
2 INJECTION INTRAMUSCULAR EVERY 4 HOURS PRN
Status: DISCONTINUED | OUTPATIENT
Start: 2019-01-22 | End: 2019-01-28

## 2019-01-22 RX ORDER — MORPHINE SULFATE 15 MG/1
15 TABLET, FILM COATED, EXTENDED RELEASE ORAL
Status: DISCONTINUED | OUTPATIENT
Start: 2019-01-22 | End: 2019-01-23

## 2019-01-22 RX ORDER — OLANZAPINE 5 MG/1
5 TABLET ORAL EVERY EVENING
Status: DISCONTINUED | OUTPATIENT
Start: 2019-01-22 | End: 2019-01-22

## 2019-01-22 RX ADMIN — DIVALPROEX SODIUM 125 MG: 125 CAPSULE, COATED PELLETS ORAL at 05:10

## 2019-01-22 RX ADMIN — OXYCODONE HYDROCHLORIDE 5 MG: 5 TABLET ORAL at 11:03

## 2019-01-22 RX ADMIN — ARIPIPRAZOLE 5 MG: 10 TABLET ORAL at 05:08

## 2019-01-22 RX ADMIN — DIVALPROEX SODIUM 125 MG: 125 CAPSULE, COATED PELLETS ORAL at 14:00

## 2019-01-22 RX ADMIN — ENOXAPARIN SODIUM 30 MG: 100 INJECTION SUBCUTANEOUS at 19:39

## 2019-01-22 RX ADMIN — ENOXAPARIN SODIUM 30 MG: 100 INJECTION SUBCUTANEOUS at 05:10

## 2019-01-22 RX ADMIN — OXYCODONE HYDROCHLORIDE 5 MG: 5 TABLET ORAL at 05:15

## 2019-01-22 RX ADMIN — HALOPERIDOL LACTATE 5 MG: 5 INJECTION, SOLUTION INTRAMUSCULAR at 17:46

## 2019-01-22 RX ADMIN — FAMOTIDINE 20 MG: 20 TABLET ORAL at 17:46

## 2019-01-22 RX ADMIN — LORAZEPAM 2 MG: 1 TABLET ORAL at 21:40

## 2019-01-22 RX ADMIN — GABAPENTIN 300 MG: 300 CAPSULE ORAL at 11:03

## 2019-01-22 RX ADMIN — OXYCODONE HYDROCHLORIDE 5 MG: 5 TABLET ORAL at 16:00

## 2019-01-22 RX ADMIN — METAXALONE 800 MG: 800 TABLET ORAL at 05:08

## 2019-01-22 RX ADMIN — FAMOTIDINE 20 MG: 20 TABLET ORAL at 05:09

## 2019-01-22 RX ADMIN — ALPRAZOLAM 0.25 MG: 0.25 TABLET ORAL at 00:32

## 2019-01-22 RX ADMIN — NICOTINE 21 MG: 21 PATCH, EXTENDED RELEASE TRANSDERMAL at 05:07

## 2019-01-22 RX ADMIN — MORPHINE SULFATE 15 MG: 15 TABLET, EXTENDED RELEASE ORAL at 20:48

## 2019-01-22 RX ADMIN — GABAPENTIN 300 MG: 300 CAPSULE ORAL at 17:46

## 2019-01-22 RX ADMIN — SENNOSIDES AND DOCUSATE SODIUM 1 TABLET: 8.6; 5 TABLET ORAL at 20:49

## 2019-01-22 RX ADMIN — GABAPENTIN 300 MG: 300 CAPSULE ORAL at 05:07

## 2019-01-22 RX ADMIN — OXYCODONE HYDROCHLORIDE 5 MG: 5 TABLET ORAL at 23:01

## 2019-01-22 RX ADMIN — LIDOCAINE 1 PATCH: 50 PATCH TOPICAL at 13:08

## 2019-01-22 ASSESSMENT — PAIN SCALES - GENERAL
PAINLEVEL_OUTOF10: 6
PAINLEVEL_OUTOF10: 10
PAINLEVEL_OUTOF10: 7
PAINLEVEL_OUTOF10: 10
PAINLEVEL_OUTOF10: 9
PAINLEVEL_OUTOF10: 8
PAINLEVEL_OUTOF10: 7

## 2019-01-22 ASSESSMENT — ENCOUNTER SYMPTOMS: ROS GI COMMENTS: BM 1/17

## 2019-01-22 NOTE — DISCHARGE PLANNING
Ag[facility]ility [facility]  Spoke To: Trav  Outcome: Per Trav, referral was submitted for review. Trav to follow up on status of referral and will call this CCA back with update.

## 2019-01-22 NOTE — PROGRESS NOTES
"Blood pressure 113/76, pulse 78, temperature 36.6 °C (97.8 °F), temperature source Temporal, resp. rate 18, height 1.727 m (5' 8\"), weight 57.8 kg (127 lb 6.8 oz), SpO2 95 %.    Notified of patient's fall while walking in the hallway with two sitters.   RN reports no new acute injury sustained during fall. Patient did not strike his head and he was wearing his c-collar at that time.    Patient rounded on. Resting asleep in bed with sitter at bedside and with bed alarm on.   Per sitter the patient tripped in the hallway and was caught and slowly assisted to the ground     Will reassess when patient awake.  "
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
## 2019-01-22 NOTE — PROGRESS NOTES
Patient alert and oriented. On room air. Vital signs stable. No change in neuro status. Complaints of neck pain managed with prn and scheduled pain meds. Patient refused bowel meds. OOB w/ standby assist. Patient slept on and off through the night. Patient took his c-collar off at one point and sitter notified this RN who went into room and put collar back on. Sitter at bedside. Bed alarm on and fall precautions in place. Handoff given to day shift RN.
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
## 2019-01-22 NOTE — PROGRESS NOTES
Patient was low fall risk, ambulating with steady gait in halls with 2 sitters, standby assist. Per patient and sitters, patient tripped on wheel of bed in hallway, fell to his knees 1/21/19 at 2355. Patient and sitters also report patient not hit his head on anything when he fell. RN reassessed patient. No injury, change in neuro status, or change in vital signs noted. Charlotte SANTOS notified. Pharmacy notified. Sitters currently at bedside, patient resting in bed. Patient HD fall risk changed to moderate, bed alarm now on. Fall precautions in place, educated patient on fall risk and precautions. Will notify family in AM.

## 2019-01-22 NOTE — PROGRESS NOTES
Pt A&Ox4. Medicated for pain per orders. Pt OOB and ambulating frequently, C-collar on at all times. Pt tolerating D1 diet with thin liquids. VSS.

## 2019-01-23 LAB
APPEARANCE UR: CLEAR
BILIRUB UR QL STRIP.AUTO: NEGATIVE
COLOR UR: YELLOW
GLUCOSE UR STRIP.AUTO-MCNC: NEGATIVE MG/DL
KETONES UR STRIP.AUTO-MCNC: ABNORMAL MG/DL
LEUKOCYTE ESTERASE UR QL STRIP.AUTO: NEGATIVE
MICRO URNS: ABNORMAL
NITRITE UR QL STRIP.AUTO: NEGATIVE
PH UR STRIP.AUTO: 6.5 [PH]
PROT UR QL STRIP: NEGATIVE MG/DL
RBC UR QL AUTO: NEGATIVE
SP GR UR STRIP.AUTO: 1.03
UROBILINOGEN UR STRIP.AUTO-MCNC: 1 MG/DL

## 2019-01-23 PROCEDURE — 700111 HCHG RX REV CODE 636 W/ 250 OVERRIDE (IP): Performed by: PSYCHIATRY & NEUROLOGY

## 2019-01-23 PROCEDURE — A9270 NON-COVERED ITEM OR SERVICE: HCPCS | Performed by: SURGERY

## 2019-01-23 PROCEDURE — 700101 HCHG RX REV CODE 250: Performed by: NURSE PRACTITIONER

## 2019-01-23 PROCEDURE — A9270 NON-COVERED ITEM OR SERVICE: HCPCS | Performed by: NURSE PRACTITIONER

## 2019-01-23 PROCEDURE — 700102 HCHG RX REV CODE 250 W/ 637 OVERRIDE(OP): Performed by: STUDENT IN AN ORGANIZED HEALTH CARE EDUCATION/TRAINING PROGRAM

## 2019-01-23 PROCEDURE — A9270 NON-COVERED ITEM OR SERVICE: HCPCS | Performed by: PSYCHIATRY & NEUROLOGY

## 2019-01-23 PROCEDURE — 770001 HCHG ROOM/CARE - MED/SURG/GYN PRIV*

## 2019-01-23 PROCEDURE — 92526 ORAL FUNCTION THERAPY: CPT

## 2019-01-23 PROCEDURE — A9270 NON-COVERED ITEM OR SERVICE: HCPCS | Performed by: STUDENT IN AN ORGANIZED HEALTH CARE EDUCATION/TRAINING PROGRAM

## 2019-01-23 PROCEDURE — 700102 HCHG RX REV CODE 250 W/ 637 OVERRIDE(OP): Performed by: SURGERY

## 2019-01-23 PROCEDURE — 700111 HCHG RX REV CODE 636 W/ 250 OVERRIDE (IP): Performed by: SURGERY

## 2019-01-23 PROCEDURE — 81003 URINALYSIS AUTO W/O SCOPE: CPT

## 2019-01-23 PROCEDURE — G0515 COGNITIVE SKILLS DEVELOPMENT: HCPCS

## 2019-01-23 PROCEDURE — 700102 HCHG RX REV CODE 250 W/ 637 OVERRIDE(OP): Performed by: NURSE PRACTITIONER

## 2019-01-23 PROCEDURE — 700102 HCHG RX REV CODE 250 W/ 637 OVERRIDE(OP): Performed by: PSYCHIATRY & NEUROLOGY

## 2019-01-23 PROCEDURE — 99233 SBSQ HOSP IP/OBS HIGH 50: CPT | Performed by: PSYCHIATRY & NEUROLOGY

## 2019-01-23 RX ORDER — OLANZAPINE 5 MG/1
5 TABLET ORAL EVERY EVENING
Status: DISCONTINUED | OUTPATIENT
Start: 2019-01-23 | End: 2019-01-28

## 2019-01-23 RX ORDER — OLANZAPINE 5 MG/1
5 TABLET ORAL NIGHTLY PRN
Status: DISCONTINUED | OUTPATIENT
Start: 2019-01-23 | End: 2019-02-13 | Stop reason: HOSPADM

## 2019-01-23 RX ADMIN — GABAPENTIN 300 MG: 300 CAPSULE ORAL at 11:15

## 2019-01-23 RX ADMIN — FAMOTIDINE 20 MG: 20 TABLET ORAL at 17:49

## 2019-01-23 RX ADMIN — NICOTINE 21 MG: 21 PATCH, EXTENDED RELEASE TRANSDERMAL at 04:57

## 2019-01-23 RX ADMIN — ENOXAPARIN SODIUM 30 MG: 100 INJECTION SUBCUTANEOUS at 04:57

## 2019-01-23 RX ADMIN — OXYCODONE HYDROCHLORIDE 5 MG: 5 TABLET ORAL at 19:48

## 2019-01-23 RX ADMIN — LORAZEPAM 2 MG: 1 TABLET ORAL at 19:48

## 2019-01-23 RX ADMIN — LORAZEPAM 2 MG: 1 TABLET ORAL at 15:36

## 2019-01-23 RX ADMIN — ACETAMINOPHEN 650 MG: 325 TABLET, FILM COATED ORAL at 23:22

## 2019-01-23 RX ADMIN — ENOXAPARIN SODIUM 30 MG: 100 INJECTION SUBCUTANEOUS at 17:49

## 2019-01-23 RX ADMIN — LORAZEPAM 2 MG: 2 INJECTION INTRAMUSCULAR at 22:24

## 2019-01-23 RX ADMIN — OXYCODONE HYDROCHLORIDE 5 MG: 5 TABLET ORAL at 11:15

## 2019-01-23 RX ADMIN — GABAPENTIN 300 MG: 300 CAPSULE ORAL at 04:57

## 2019-01-23 RX ADMIN — OXYCODONE HYDROCHLORIDE 5 MG: 5 TABLET ORAL at 15:15

## 2019-01-23 RX ADMIN — GABAPENTIN 300 MG: 300 CAPSULE ORAL at 17:49

## 2019-01-23 RX ADMIN — OLANZAPINE 5 MG: 5 TABLET, FILM COATED ORAL at 23:22

## 2019-01-23 RX ADMIN — OXYCODONE HYDROCHLORIDE 5 MG: 5 TABLET ORAL at 03:24

## 2019-01-23 RX ADMIN — LIDOCAINE 1 PATCH: 50 PATCH TOPICAL at 11:14

## 2019-01-23 RX ADMIN — FAMOTIDINE 20 MG: 20 TABLET ORAL at 04:57

## 2019-01-23 ASSESSMENT — PAIN SCALES - GENERAL
PAINLEVEL_OUTOF10: 10
PAINLEVEL_OUTOF10: 9
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
## 2019-01-23 NOTE — CARE PLAN
Problem: Pain Management  Goal: Pain level will decrease to patient's comfort goal  Outcome: PROGRESSING AS EXPECTED  Medicating for neck pain per MAR   Pt consistently reports pain 8-9/10   Encouraging c collar on at all times       Problem: Psychosocial Needs:  Goal: Level of anxiety will decrease  Outcome: PROGRESSING AS EXPECTED  Pt reports anxiety   Medicated per MAR with + results   Encouraging low stimuli environment   Reassurance and therapeutic communication provided
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
## 2019-01-23 NOTE — THERAPY
"Speech Language Therapy dysphagia treatment and cognitive-linguistic treatment completed.   Functional Status:  Pt seen over two sessions for work to address dysphagia as well as cogling tx to facilitate safety and management of diet upgrade.  Re swallow, pt can tolerate a D2 diet (soft, moist solids), HOWEVER, he is impulsive with bolus size and requires 1:1 fdg and repetition to follow recommended strategies.  Re cogling status, pt is labile, an unreliable historian, with poor insight into current deficits beyond a superficial level.  85% accuracy with recall of recommended swallow strategies with mod verbal and written cues but, again, requires constant cues to sequence and follow safe dysphagia plan.  Is not safe for independent d/c and displays poor insight and executive fxn needed for independent d/c.  Recommendations: Diet upgraded to D2/thin liquids with 1:1 cues required for safe po.  Pls do NOT leave jerrica crackers at bedside.  SLP to follow for cogling and dysphagia tx.   Plan of Care: Will benefit from Speech Therapy 5 times per week  Post-Acute Therapy: Discharge to a transitional care facility for continued skilled therapy services.    See \"Rehab Therapy-Acute\" Patient Summary Report for complete documentation.     "

## 2019-01-23 NOTE — PROGRESS NOTES
pts sister, Meg Zambrano, verified by correct phone number and name in chart. She is requesting that social work give her a call in morning,

## 2019-01-23 NOTE — PROGRESS NOTES
2 RN skin check:     C collar removed temporarily for skin check, pads intact. Lidocaine patch removed. Skin pink and blanching. Pt refusing to have backside assessed.

## 2019-01-23 NOTE — PROGRESS NOTES
Called into room by PSA sitter at aprox 1545. Pt on floor next to bed. Pt states that he slipped on item on floor and fell. PSA states in private that pt appeared to intentionally move to floor. Pt helped back to bed, neuro assessment stable. Pain stable. C-collar in place and intact. Charge RN updated. Will update trauma APRN, awaiting callback.

## 2019-01-23 NOTE — DISCHARGE PLANNING
Anticipated Discharge Disposition:   [facility]  [facility]    Action:    -contacted Judy with [facility] [phone] option 2 to inform that [facility] declined.  She will contact [facility] for redetermination.  Will follow up with RN CM.    Barriers to Discharge:    Placement    Plan:    [facility]

## 2019-01-23 NOTE — PROGRESS NOTES
Trauma Progress Note    Nursing updated that pt requesting to be seen by provider to discuss discharge plans    Pt seen  Resting in bed having lunch  States his current plan to is to go live with his sister in [place], [place]  Will have SW contact sister to confirm this  RN to contact SLP to have pt re-evaluated for swallow at this time, last seen 1/18

## 2019-01-23 NOTE — PSYCHIATRY
Full note to follow. Haldol is not preferred in TBIs, neither is ativan but at this point I would rather have him on ativan as he seems to be tolerating it better. Not sure why his behavior is decompensating, but will d/c depakote and abilify and switch to zyprexa. Will start zyprexa tomorrow night since he already got two different antipsychotics today, to reduce risk of NMS or akathisia.     I am not planning to keep this much ativan on for more than 24-48 hours; just would like to get him calm and transitioned to zyprexa.     Will follow.

## 2019-01-23 NOTE — PROGRESS NOTES
Pt A&Ox4, anxious, easily irritable. Pt WALSH, denies N/T, c collar in place. PSA sitter at bedside, has good rapport with pt. Clean urinal provided to pt, educated pt of urine sample collection. Pt voided in toilet in attempts to have BM. Pt tolerating diet, -N/V, ambulates in [address] with sitter to retrieve snacks from kitchen, good appetite.

## 2019-01-23 NOTE — CARE PLAN
Problem: Safety  Goal: Will remain free from injury  Outcome: PROGRESSING AS EXPECTED  Sitter present    Problem: Pain Management  Goal: Pain level will decrease to patient's comfort goal  Outcome: PROGRESSING SLOWER THAN EXPECTED  Pain meds given per MAR
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
## 2019-01-23 NOTE — PROGRESS NOTES
Pt AAox4, Shanice with 5/5 strength. C/O neck pain, meds given per MAR. Up with SBA, steady gait. Voiding and eating. Pt c/o of anxiety and asking for anxiety medication frequently, psych MD notified. VSS. C-collar in place.

## 2019-01-24 LAB
ALBUMIN SERPL BCP-MCNC: 3.7 G/DL (ref 3.2–4.9)
ALBUMIN/GLOB SERPL: 1.3 G/DL
ALP SERPL-CCNC: 78 U/L (ref 30–99)
ALT SERPL-CCNC: 31 U/L (ref 2–50)
ANION GAP SERPL CALC-SCNC: 9 MMOL/L (ref 0–11.9)
AST SERPL-CCNC: 15 U/L (ref 12–45)
BASOPHILS # BLD AUTO: 0.5 % (ref 0–1.8)
BASOPHILS # BLD: 0.07 K/UL (ref 0–0.12)
BILIRUB SERPL-MCNC: 0.4 MG/DL (ref 0.1–1.5)
BUN SERPL-MCNC: 26 MG/DL (ref 8–22)
CALCIUM SERPL-MCNC: 9 MG/DL (ref 8.5–10.5)
CHLORIDE SERPL-SCNC: 101 MMOL/L (ref 96–112)
CO2 SERPL-SCNC: 27 MMOL/L (ref 20–33)
CREAT SERPL-MCNC: 0.81 MG/DL (ref 0.5–1.4)
EOSINOPHIL # BLD AUTO: 0.17 K/UL (ref 0–0.51)
EOSINOPHIL NFR BLD: 1.3 % (ref 0–6.9)
ERYTHROCYTE [DISTWIDTH] IN BLOOD BY AUTOMATED COUNT: 48 FL (ref 35.9–50)
GLOBULIN SER CALC-MCNC: 2.8 G/DL (ref 1.9–3.5)
GLUCOSE SERPL-MCNC: 94 MG/DL (ref 65–99)
HCT VFR BLD AUTO: 40.9 % (ref 42–52)
HGB BLD-MCNC: 13.1 G/DL (ref 14–18)
IMM GRANULOCYTES # BLD AUTO: 0.07 K/UL (ref 0–0.11)
IMM GRANULOCYTES NFR BLD AUTO: 0.5 % (ref 0–0.9)
LYMPHOCYTES # BLD AUTO: 2.84 K/UL (ref 1–4.8)
LYMPHOCYTES NFR BLD: 22.1 % (ref 22–41)
MCH RBC QN AUTO: 29.8 PG (ref 27–33)
MCHC RBC AUTO-ENTMCNC: 32 G/DL (ref 33.7–35.3)
MCV RBC AUTO: 93 FL (ref 81.4–97.8)
MONOCYTES # BLD AUTO: 0.76 K/UL (ref 0–0.85)
MONOCYTES NFR BLD AUTO: 5.9 % (ref 0–13.4)
NEUTROPHILS # BLD AUTO: 8.93 K/UL (ref 1.82–7.42)
NEUTROPHILS NFR BLD: 69.7 % (ref 44–72)
NRBC # BLD AUTO: 0 K/UL
NRBC BLD-RTO: 0 /100 WBC
PLATELET # BLD AUTO: 432 K/UL (ref 164–446)
PMV BLD AUTO: 8.2 FL (ref 9–12.9)
POTASSIUM SERPL-SCNC: 3.7 MMOL/L (ref 3.6–5.5)
PROT SERPL-MCNC: 6.5 G/DL (ref 6–8.2)
RBC # BLD AUTO: 4.4 M/UL (ref 4.7–6.1)
SODIUM SERPL-SCNC: 137 MMOL/L (ref 135–145)
WBC # BLD AUTO: 12.8 K/UL (ref 4.8–10.8)

## 2019-01-24 PROCEDURE — 700111 HCHG RX REV CODE 636 W/ 250 OVERRIDE (IP): Performed by: SURGERY

## 2019-01-24 PROCEDURE — A9270 NON-COVERED ITEM OR SERVICE: HCPCS | Performed by: NURSE PRACTITIONER

## 2019-01-24 PROCEDURE — 85025 COMPLETE CBC W/AUTO DIFF WBC: CPT

## 2019-01-24 PROCEDURE — 700102 HCHG RX REV CODE 250 W/ 637 OVERRIDE(OP): Performed by: NURSE PRACTITIONER

## 2019-01-24 PROCEDURE — A9270 NON-COVERED ITEM OR SERVICE: HCPCS | Performed by: STUDENT IN AN ORGANIZED HEALTH CARE EDUCATION/TRAINING PROGRAM

## 2019-01-24 PROCEDURE — 700102 HCHG RX REV CODE 250 W/ 637 OVERRIDE(OP): Performed by: PSYCHIATRY & NEUROLOGY

## 2019-01-24 PROCEDURE — 700111 HCHG RX REV CODE 636 W/ 250 OVERRIDE (IP): Performed by: PSYCHIATRY & NEUROLOGY

## 2019-01-24 PROCEDURE — 80053 COMPREHEN METABOLIC PANEL: CPT

## 2019-01-24 PROCEDURE — 700102 HCHG RX REV CODE 250 W/ 637 OVERRIDE(OP): Performed by: STUDENT IN AN ORGANIZED HEALTH CARE EDUCATION/TRAINING PROGRAM

## 2019-01-24 PROCEDURE — 700102 HCHG RX REV CODE 250 W/ 637 OVERRIDE(OP): Performed by: SURGERY

## 2019-01-24 PROCEDURE — 36415 COLL VENOUS BLD VENIPUNCTURE: CPT

## 2019-01-24 PROCEDURE — 99233 SBSQ HOSP IP/OBS HIGH 50: CPT | Performed by: PSYCHIATRY & NEUROLOGY

## 2019-01-24 PROCEDURE — A9270 NON-COVERED ITEM OR SERVICE: HCPCS | Performed by: PSYCHIATRY & NEUROLOGY

## 2019-01-24 PROCEDURE — A9270 NON-COVERED ITEM OR SERVICE: HCPCS | Performed by: SURGERY

## 2019-01-24 PROCEDURE — 770001 HCHG ROOM/CARE - MED/SURG/GYN PRIV*

## 2019-01-24 PROCEDURE — 92526 ORAL FUNCTION THERAPY: CPT

## 2019-01-24 RX ORDER — LORAZEPAM 1 MG/1
1 TABLET ORAL EVERY 4 HOURS PRN
Status: DISCONTINUED | OUTPATIENT
Start: 2019-01-24 | End: 2019-02-01

## 2019-01-24 RX ORDER — GABAPENTIN 100 MG/1
200 CAPSULE ORAL 3 TIMES DAILY
Status: DISCONTINUED | OUTPATIENT
Start: 2019-01-24 | End: 2019-02-01

## 2019-01-24 RX ADMIN — ENOXAPARIN SODIUM 30 MG: 100 INJECTION SUBCUTANEOUS at 17:52

## 2019-01-24 RX ADMIN — ACETAMINOPHEN 650 MG: 325 TABLET, FILM COATED ORAL at 10:01

## 2019-01-24 RX ADMIN — GABAPENTIN 200 MG: 100 CAPSULE ORAL at 12:43

## 2019-01-24 RX ADMIN — NICOTINE 21 MG: 21 PATCH, EXTENDED RELEASE TRANSDERMAL at 05:12

## 2019-01-24 RX ADMIN — FAMOTIDINE 20 MG: 20 TABLET ORAL at 05:13

## 2019-01-24 RX ADMIN — ENOXAPARIN SODIUM 30 MG: 100 INJECTION SUBCUTANEOUS at 05:13

## 2019-01-24 RX ADMIN — LORAZEPAM 2 MG: 2 INJECTION INTRAMUSCULAR at 02:56

## 2019-01-24 RX ADMIN — SENNOSIDES AND DOCUSATE SODIUM 1 TABLET: 8.6; 5 TABLET ORAL at 20:37

## 2019-01-24 RX ADMIN — OLANZAPINE 5 MG: 5 TABLET, FILM COATED ORAL at 17:52

## 2019-01-24 RX ADMIN — GABAPENTIN 300 MG: 300 CAPSULE ORAL at 05:13

## 2019-01-24 RX ADMIN — OLANZAPINE 5 MG: 5 TABLET, FILM COATED ORAL at 22:58

## 2019-01-24 RX ADMIN — GABAPENTIN 200 MG: 100 CAPSULE ORAL at 18:00

## 2019-01-24 RX ADMIN — OXYCODONE HYDROCHLORIDE 5 MG: 5 TABLET ORAL at 05:13

## 2019-01-24 RX ADMIN — ACETAMINOPHEN 650 MG: 325 TABLET, FILM COATED ORAL at 15:57

## 2019-01-24 RX ADMIN — LORAZEPAM 1 MG: 1 TABLET ORAL at 19:42

## 2019-01-24 RX ADMIN — POLYETHYLENE GLYCOL 3350 1 PACKET: 17 POWDER, FOR SOLUTION ORAL at 05:13

## 2019-01-24 ASSESSMENT — PATIENT HEALTH QUESTIONNAIRE - PHQ9
SUM OF ALL RESPONSES TO PHQ9 QUESTIONS 1 AND 2: 0
1. LITTLE INTEREST OR PLEASURE IN DOING THINGS: NOT AT ALL
SUM OF ALL RESPONSES TO PHQ9 QUESTIONS 1 AND 2: 0
2. FEELING DOWN, DEPRESSED, IRRITABLE, OR HOPELESS: NOT AT ALL
1. LITTLE INTEREST OR PLEASURE IN DOING THINGS: NOT AT ALL
2. FEELING DOWN, DEPRESSED, IRRITABLE, OR HOPELESS: NOT AT ALL
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
## 2019-01-24 NOTE — DISCHARGE PLANNING
Agency/Facility Name: [facility](p:[phone])  Outcome: Attempted to contact Admissions to verify fax number, however, no answer. Voicemail left.

## 2019-01-24 NOTE — PROGRESS NOTES
Trauma Progress Note    Nursing telephoned, pts sister Meg would like to discuss the discharge plan    Meg contacted  Meg is the patients next of kin, Magalis is not  to the patient and he has a 10 year old son  Meg would like to pursue guardianship of the patient, have the patient establish with [place] Medicaid and either transfer to a [place] rehab or discharge to her home with outpatient services    Case reviewed with Marian BORDEN who is going to reach out to Meg again
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
## 2019-01-24 NOTE — CARE PLAN
Problem: Pain Management  Goal: Pain level will decrease to patient's comfort goal  Outcome: PROGRESSING AS EXPECTED  Medicating for neck pain per MAR   Pt consistently reports pain 8-9/10   Encouraging c collar on at all times   Repositioning for comfort   Offered ice and heat packs     Problem: Psychosocial Needs:  Goal: Level of anxiety will decrease  Outcome: PROGRESSING AS EXPECTED  Pt reports anxiety   Medicated per MAR  Encouraging low stimuli environment   Reassurance and therapeutic communication provided

## 2019-01-24 NOTE — PROGRESS NOTES
Attempted to call son Brendan at number listed, busy signal.     Attempted to call sister Meg, voicemail left in regards to pt attempting to leave the unit and in restraints for interference with medical treatment. Left call back number for Meg to reach RN.

## 2019-01-24 NOTE — CARE PLAN
Problem: Safety  Goal: Will remain free from injury  Outcome: PROGRESSING AS EXPECTED  Sitter present    Problem: Venous Thromboembolism (VTW)/Deep Vein Thrombosis (DVT) Prevention:  Goal: Patient will participate in Venous Thrombosis (VTE)/Deep Vein Thrombosis (DVT)Prevention Measures  Outcome: PROGRESSING SLOWER THAN EXPECTED  Refuses SCDs, ambulates frequently

## 2019-01-24 NOTE — PROGRESS NOTES
2 RN skin check:     Skin pink and blanching to posterior and anterior neck. Pads in placed to c collar. Lidocaine patch has been removed. BLE flaky and dry. Scattered bruises to BUE and BLE. Buttocks pink and blanching. Assessing beneath restraints for any skin breakdown.     Pt continues to be restless and agitated at times, attempting to bite off restraints and kicking foot of bed. Security was called for assistance to remove pt's clothing and place him in hospital gown, reposition in bed.

## 2019-01-24 NOTE — PROGRESS NOTES
Notified by PSA sitter that pt unwilling to return to his room and states he is leaving to have a cigarette. Staff unable to de-escalate situation as pt ambulating off unit towards [place], security was immediately called for assistance. Pt was approached by security and pt was combative. Pt was escorted back to his room and placed in restraints. A call was placed to Sarita ALMANZA and was informed of the situation. 2 mg IV ativan was administered and security remained on stand by until pt could be transferred to a room closer to the nurses' station. Received call back from Sarita ALMANZA for update, was notified that a PSA sitter remains with pt while in restraints to bilateral wrists and right leg.

## 2019-01-24 NOTE — PROGRESS NOTES
Pt AAOx4, neuro assessment stable. C-collar in place. One episode of agitation today when pt demanding to talk to MD and SS concerning discharge. Trauma APRN talked to pt and pt calmed down. Pt c/o of severe anxiety this afternoon, oral ativan given with good relief. VSS. Diet upgraded per SLP, pt  reminded to eat slowly and clear throat often, sometimes eats too quickly.
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
## 2019-01-24 NOTE — THERAPY
"Speech Language Therapy dysphagia treatment completed.   Functional Status:  Pt less than optimally actively participating due to residual affects of early am meds for behavior.  Strategies posted and pt requires continued frequent cues to clear gurlgy vocal quality both with saliva and during po intake.  Pt able to clear with cue and subsequent swallow.  Recommendations: D2/thins with po held if pt is not fully participating.  Cue to clear throat as needed on saliva and during meals.  Plan of Care: Will benefit from Speech Therapy 5 times per week  Post-Acute Therapy: Discharge to a transitional care facility for continued skilled therapy services.    See \"Rehab Therapy-Acute\" Patient Summary Report for complete documentation.     "

## 2019-01-25 PROCEDURE — 700102 HCHG RX REV CODE 250 W/ 637 OVERRIDE(OP): Performed by: STUDENT IN AN ORGANIZED HEALTH CARE EDUCATION/TRAINING PROGRAM

## 2019-01-25 PROCEDURE — 700111 HCHG RX REV CODE 636 W/ 250 OVERRIDE (IP): Performed by: PSYCHIATRY & NEUROLOGY

## 2019-01-25 PROCEDURE — 700102 HCHG RX REV CODE 250 W/ 637 OVERRIDE(OP): Performed by: NURSE PRACTITIONER

## 2019-01-25 PROCEDURE — A9270 NON-COVERED ITEM OR SERVICE: HCPCS | Performed by: NURSE PRACTITIONER

## 2019-01-25 PROCEDURE — A9270 NON-COVERED ITEM OR SERVICE: HCPCS | Performed by: STUDENT IN AN ORGANIZED HEALTH CARE EDUCATION/TRAINING PROGRAM

## 2019-01-25 PROCEDURE — 770001 HCHG ROOM/CARE - MED/SURG/GYN PRIV*

## 2019-01-25 PROCEDURE — 700112 HCHG RX REV CODE 229: Performed by: NURSE PRACTITIONER

## 2019-01-25 PROCEDURE — 700102 HCHG RX REV CODE 250 W/ 637 OVERRIDE(OP): Performed by: PSYCHIATRY & NEUROLOGY

## 2019-01-25 PROCEDURE — 700111 HCHG RX REV CODE 636 W/ 250 OVERRIDE (IP): Performed by: SURGERY

## 2019-01-25 PROCEDURE — A9270 NON-COVERED ITEM OR SERVICE: HCPCS | Performed by: PSYCHIATRY & NEUROLOGY

## 2019-01-25 RX ORDER — TRAMADOL HYDROCHLORIDE 50 MG/1
50-100 TABLET ORAL EVERY 6 HOURS PRN
Status: DISCONTINUED | OUTPATIENT
Start: 2019-01-25 | End: 2019-02-13 | Stop reason: HOSPADM

## 2019-01-25 RX ORDER — OXYCODONE HYDROCHLORIDE AND ACETAMINOPHEN 5; 325 MG/1; MG/1
1 TABLET ORAL EVERY 6 HOURS PRN
Status: DISCONTINUED | OUTPATIENT
Start: 2019-01-25 | End: 2019-02-13 | Stop reason: HOSPADM

## 2019-01-25 RX ADMIN — NICOTINE 21 MG: 21 PATCH, EXTENDED RELEASE TRANSDERMAL at 04:16

## 2019-01-25 RX ADMIN — GABAPENTIN 200 MG: 100 CAPSULE ORAL at 18:35

## 2019-01-25 RX ADMIN — ACETAMINOPHEN 650 MG: 325 TABLET, FILM COATED ORAL at 02:57

## 2019-01-25 RX ADMIN — GABAPENTIN 200 MG: 100 CAPSULE ORAL at 04:16

## 2019-01-25 RX ADMIN — OXYCODONE AND ACETAMINOPHEN 1 TABLET: 5; 325 TABLET ORAL at 13:09

## 2019-01-25 RX ADMIN — LORAZEPAM 1 MG: 1 TABLET ORAL at 02:57

## 2019-01-25 RX ADMIN — TRAMADOL HYDROCHLORIDE 100 MG: 50 TABLET, COATED ORAL at 10:34

## 2019-01-25 RX ADMIN — DOCUSATE SODIUM 100 MG: 100 CAPSULE, LIQUID FILLED ORAL at 04:16

## 2019-01-25 RX ADMIN — TRAMADOL HYDROCHLORIDE 100 MG: 50 TABLET, COATED ORAL at 18:35

## 2019-01-25 RX ADMIN — DOCUSATE SODIUM 100 MG: 100 CAPSULE, LIQUID FILLED ORAL at 18:00

## 2019-01-25 RX ADMIN — LORAZEPAM 2 MG: 2 INJECTION INTRAMUSCULAR at 20:06

## 2019-01-25 RX ADMIN — OLANZAPINE 5 MG: 5 TABLET, FILM COATED ORAL at 18:35

## 2019-01-25 RX ADMIN — OLANZAPINE 5 MG: 5 TABLET, FILM COATED ORAL at 19:15

## 2019-01-25 RX ADMIN — OXYCODONE AND ACETAMINOPHEN 1 TABLET: 5; 325 TABLET ORAL at 19:15

## 2019-01-25 RX ADMIN — LORAZEPAM 2 MG: 2 INJECTION INTRAMUSCULAR at 15:45

## 2019-01-25 RX ADMIN — ENOXAPARIN SODIUM 30 MG: 100 INJECTION SUBCUTANEOUS at 04:16

## 2019-01-25 RX ADMIN — GABAPENTIN 200 MG: 100 CAPSULE ORAL at 13:12

## 2019-01-25 RX ADMIN — ENOXAPARIN SODIUM 30 MG: 100 INJECTION SUBCUTANEOUS at 18:34

## 2019-01-25 RX ADMIN — ANTACID TABLETS 500 MG: 500 TABLET, CHEWABLE ORAL at 04:25

## 2019-01-25 ASSESSMENT — ENCOUNTER SYMPTOMS
VOMITING: 0
ABDOMINAL PAIN: 0
NECK PAIN: 1
SHORTNESS OF BREATH: 0
ROS GI COMMENTS: BM 1/23
DIZZINESS: 0
FEVER: 0
CHILLS: 0
HEADACHES: 0
NAUSEA: 0
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
## 2019-01-25 NOTE — PROGRESS NOTES
Assumed care of patient at 0700.    Received report from night RN.    Pt asleep.  Pt resting comfortably in bed.   Sitter at bedside.  Hourly rounding implemented.

## 2019-01-25 NOTE — CARE PLAN
Problem: Safety  Goal: Will remain free from injury  Outcome: PROGRESSING AS EXPECTED  Reinforced safety, use call bell for assistance, bed in lowest position. Safety sitter at bedside. Patient close to nurses station.     Problem: Mobility  Goal: Risk for activity intolerance will decrease  Outcome: PROGRESSING AS EXPECTED  Encouraged patient to ambulate and reposition frequently.

## 2019-01-25 NOTE — PROGRESS NOTES
"Pt requesting pain medication for his neck.  Offered pt tylenol per order.  Pt refusing tylenol nol stating \"I know this pain and it wont work for it. I'll pass.\" Pt demanding to speak with MD.  Kemi OLIVIA notified.  Kemi said she will put an order in [facility] for pain management.   "

## 2019-01-25 NOTE — PROGRESS NOTES
Received bedside report from day shift RN. Patient AOx4. Patient calm at this time. Safety sitter at bedside. Patient complains of neck pain, reviewed PRN medications. Reinforced safety, use call bell for assistance, bed in lowest position.

## 2019-01-25 NOTE — PROGRESS NOTES
Patient resting in bed at this time, alert and oriented x4, and sitter is at the bedside. Fall education provided, instructed patient to use the call light for any needs, and rn will continue to monitor patient. No acute overnight events to report.

## 2019-01-25 NOTE — PROGRESS NOTES
"Pt has been complaining of his pain and wanted more medication so he can sleep. Pt has been sleeping on and off throughout the day.  PT wanting to sign out AMA but Psychiatry states that pt does not have capacity to make medical decisions.  Pt states \"I just googled it and Dr. Vickers said there isn't anything in the law that states they can make me stay here\".  I explained to pt since he has had a brain injury he isn't in the right mind yet to make decisions for himself, medically, per Psychiatry.  Pt becoming increasingly agitated and said that he was going to stay in the middle of the hallway til the Psychiatry comes and sees him, and he wasn't going to move til then.  Security just happened to come by and see pt becoming agitated and tried to get pt to go back in room.  Pt not cooperating so pt was moved to his bed and put in b/l wrist restraints.  PT removed restraints within a few minutes.  Ativan given to pt.  Pt now resting in bed.    "
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
## 2019-01-25 NOTE — DISCHARGE PLANNING
Anticipated Discharge Disposition: TBD    Action: LSW requested [place] [facility] to f/u with [facility].    Barriers to Discharge: Safe dc plan; Physiatry Re-evaluation    Plan: LSW to f/u with [facility]

## 2019-01-25 NOTE — PROGRESS NOTES
Darion RN resumed patient care, reported off at bedside. Patient AOx4, resting in bed at this time.

## 2019-01-25 NOTE — CARE PLAN
Problem: Communication  Goal: The ability to communicate needs accurately and effectively will improve    Intervention: Tyler patient and significant other/support system to call light to alert staff of needs  Patient oriented to call light system, call light within reach, and patient instructed to call for help for any needs       Problem: Safety  Goal: Will remain free from falls    Intervention: Assess risk factors for falls  Patient at risk for falls, patient has fall precautions in place, sitter at bedside      Problem: Infection  Goal: Will remain free from infection    Intervention: Implement standard precautions and perform hand washing before and after patient contact  Hand hygiene performed before and after patient contact

## 2019-01-25 NOTE — DISCHARGE PLANNING
Agency/Facility Name: [facility](p:[phone])  Outcome: Attempted to contact Admissions to verify fax number, however, no answer. Voicemail left.

## 2019-01-26 PROCEDURE — 700102 HCHG RX REV CODE 250 W/ 637 OVERRIDE(OP): Performed by: STUDENT IN AN ORGANIZED HEALTH CARE EDUCATION/TRAINING PROGRAM

## 2019-01-26 PROCEDURE — A9270 NON-COVERED ITEM OR SERVICE: HCPCS | Performed by: NURSE PRACTITIONER

## 2019-01-26 PROCEDURE — 700111 HCHG RX REV CODE 636 W/ 250 OVERRIDE (IP): Performed by: PSYCHIATRY & NEUROLOGY

## 2019-01-26 PROCEDURE — 770001 HCHG ROOM/CARE - MED/SURG/GYN PRIV*

## 2019-01-26 PROCEDURE — 700102 HCHG RX REV CODE 250 W/ 637 OVERRIDE(OP): Performed by: NURSE PRACTITIONER

## 2019-01-26 PROCEDURE — 700112 HCHG RX REV CODE 229: Performed by: NURSE PRACTITIONER

## 2019-01-26 PROCEDURE — 700102 HCHG RX REV CODE 250 W/ 637 OVERRIDE(OP): Performed by: PSYCHIATRY & NEUROLOGY

## 2019-01-26 PROCEDURE — A9270 NON-COVERED ITEM OR SERVICE: HCPCS | Performed by: STUDENT IN AN ORGANIZED HEALTH CARE EDUCATION/TRAINING PROGRAM

## 2019-01-26 PROCEDURE — 700111 HCHG RX REV CODE 636 W/ 250 OVERRIDE (IP): Performed by: SURGERY

## 2019-01-26 PROCEDURE — A9270 NON-COVERED ITEM OR SERVICE: HCPCS | Performed by: PSYCHIATRY & NEUROLOGY

## 2019-01-26 RX ADMIN — OLANZAPINE 5 MG: 5 TABLET, FILM COATED ORAL at 18:06

## 2019-01-26 RX ADMIN — ENOXAPARIN SODIUM 30 MG: 100 INJECTION SUBCUTANEOUS at 09:13

## 2019-01-26 RX ADMIN — LORAZEPAM 2 MG: 2 INJECTION INTRAMUSCULAR at 18:07

## 2019-01-26 RX ADMIN — DOCUSATE SODIUM 100 MG: 100 CAPSULE, LIQUID FILLED ORAL at 18:00

## 2019-01-26 RX ADMIN — OXYCODONE AND ACETAMINOPHEN 1 TABLET: 5; 325 TABLET ORAL at 13:25

## 2019-01-26 RX ADMIN — ENOXAPARIN SODIUM 30 MG: 100 INJECTION SUBCUTANEOUS at 20:47

## 2019-01-26 RX ADMIN — LORAZEPAM 2 MG: 2 INJECTION INTRAMUSCULAR at 09:35

## 2019-01-26 RX ADMIN — LORAZEPAM 2 MG: 2 INJECTION INTRAMUSCULAR at 14:16

## 2019-01-26 RX ADMIN — OLANZAPINE 5 MG: 5 TABLET, FILM COATED ORAL at 20:46

## 2019-01-26 RX ADMIN — DOCUSATE SODIUM 100 MG: 100 CAPSULE, LIQUID FILLED ORAL at 06:00

## 2019-01-26 RX ADMIN — GABAPENTIN 200 MG: 100 CAPSULE ORAL at 18:06

## 2019-01-26 RX ADMIN — GABAPENTIN 200 MG: 100 CAPSULE ORAL at 12:58

## 2019-01-26 RX ADMIN — NICOTINE 21 MG: 21 PATCH, EXTENDED RELEASE TRANSDERMAL at 09:11

## 2019-01-26 RX ADMIN — OXYCODONE AND ACETAMINOPHEN 1 TABLET: 5; 325 TABLET ORAL at 20:46

## 2019-01-26 ASSESSMENT — ENCOUNTER SYMPTOMS
NERVOUS/ANXIOUS: 1
HEADACHES: 0
ABDOMINAL PAIN: 0
NAUSEA: 0
ROS GI COMMENTS: BM 1/23
SHORTNESS OF BREATH: 0
VOMITING: 0
DIZZINESS: 0
FEVER: 0
NECK PAIN: 1
CHILLS: 0
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
## 2019-01-26 NOTE — PROGRESS NOTES
Patient slept well through night, requesting morning medications later this morning. Patient is alert and oriented x4, sitter at bedside, and patient has no c/o pain this morning. Rn will continue to assess

## 2019-01-26 NOTE — PROGRESS NOTES
Assumed care of patient at 0700.    Received report from night RN.    Pt alert and oriented x 4, has no complaints of pain.  Pt resting comfortably in bed.   Sitter at bedside.  Pt wearing hard cervical collar.  Hourly rounding implemented.

## 2019-01-26 NOTE — PROGRESS NOTES
Patient currently up, pacing in room, and alert and oriented x4. Patient c/o of neck pain, prn pain medication given. Safety sitter at bedside at this time.   Patient requesting psychiatry to see him again to reevaluate his mental status.

## 2019-01-27 PROCEDURE — A9270 NON-COVERED ITEM OR SERVICE: HCPCS | Performed by: STUDENT IN AN ORGANIZED HEALTH CARE EDUCATION/TRAINING PROGRAM

## 2019-01-27 PROCEDURE — 700102 HCHG RX REV CODE 250 W/ 637 OVERRIDE(OP): Performed by: PSYCHIATRY & NEUROLOGY

## 2019-01-27 PROCEDURE — 770001 HCHG ROOM/CARE - MED/SURG/GYN PRIV*

## 2019-01-27 PROCEDURE — A9270 NON-COVERED ITEM OR SERVICE: HCPCS | Performed by: NURSE PRACTITIONER

## 2019-01-27 PROCEDURE — 700111 HCHG RX REV CODE 636 W/ 250 OVERRIDE (IP): Performed by: SURGERY

## 2019-01-27 PROCEDURE — 700112 HCHG RX REV CODE 229: Performed by: NURSE PRACTITIONER

## 2019-01-27 PROCEDURE — 700102 HCHG RX REV CODE 250 W/ 637 OVERRIDE(OP): Performed by: NURSE PRACTITIONER

## 2019-01-27 PROCEDURE — 700102 HCHG RX REV CODE 250 W/ 637 OVERRIDE(OP): Performed by: STUDENT IN AN ORGANIZED HEALTH CARE EDUCATION/TRAINING PROGRAM

## 2019-01-27 PROCEDURE — 700111 HCHG RX REV CODE 636 W/ 250 OVERRIDE (IP): Performed by: PSYCHIATRY & NEUROLOGY

## 2019-01-27 PROCEDURE — A9270 NON-COVERED ITEM OR SERVICE: HCPCS | Performed by: PSYCHIATRY & NEUROLOGY

## 2019-01-27 RX ADMIN — NICOTINE 21 MG: 21 PATCH, EXTENDED RELEASE TRANSDERMAL at 08:02

## 2019-01-27 RX ADMIN — GABAPENTIN 200 MG: 100 CAPSULE ORAL at 07:57

## 2019-01-27 RX ADMIN — DOCUSATE SODIUM 100 MG: 100 CAPSULE, LIQUID FILLED ORAL at 07:52

## 2019-01-27 RX ADMIN — OXYCODONE AND ACETAMINOPHEN 1 TABLET: 5; 325 TABLET ORAL at 15:08

## 2019-01-27 RX ADMIN — TRAMADOL HYDROCHLORIDE 100 MG: 50 TABLET, COATED ORAL at 16:14

## 2019-01-27 RX ADMIN — TRAMADOL HYDROCHLORIDE 100 MG: 50 TABLET, COATED ORAL at 09:15

## 2019-01-27 RX ADMIN — LORAZEPAM 2 MG: 2 INJECTION INTRAMUSCULAR at 07:58

## 2019-01-27 RX ADMIN — DOCUSATE SODIUM 100 MG: 100 CAPSULE, LIQUID FILLED ORAL at 17:27

## 2019-01-27 RX ADMIN — OXYCODONE AND ACETAMINOPHEN 1 TABLET: 5; 325 TABLET ORAL at 21:01

## 2019-01-27 RX ADMIN — GABAPENTIN 200 MG: 100 CAPSULE ORAL at 17:26

## 2019-01-27 RX ADMIN — OLANZAPINE 5 MG: 5 TABLET, FILM COATED ORAL at 17:26

## 2019-01-27 RX ADMIN — OXYCODONE AND ACETAMINOPHEN 1 TABLET: 5; 325 TABLET ORAL at 07:52

## 2019-01-27 RX ADMIN — ENOXAPARIN SODIUM 30 MG: 100 INJECTION SUBCUTANEOUS at 17:22

## 2019-01-27 RX ADMIN — LORAZEPAM 2 MG: 2 INJECTION INTRAMUSCULAR at 03:05

## 2019-01-27 RX ADMIN — LORAZEPAM 2 MG: 2 INJECTION INTRAMUSCULAR at 21:40

## 2019-01-27 RX ADMIN — ENOXAPARIN SODIUM 30 MG: 100 INJECTION SUBCUTANEOUS at 08:00

## 2019-01-27 RX ADMIN — SENNOSIDES AND DOCUSATE SODIUM 1 TABLET: 8.6; 5 TABLET ORAL at 17:28

## 2019-01-27 RX ADMIN — LORAZEPAM 2 MG: 2 INJECTION INTRAMUSCULAR at 13:33

## 2019-01-27 RX ADMIN — LORAZEPAM 2 MG: 2 INJECTION INTRAMUSCULAR at 17:21

## 2019-01-27 RX ADMIN — GABAPENTIN 200 MG: 100 CAPSULE ORAL at 13:33

## 2019-01-27 ASSESSMENT — ENCOUNTER SYMPTOMS
FEVER: 0
DIZZINESS: 0
CHILLS: 0
VOMITING: 0
NAUSEA: 0
ROS GI COMMENTS: BM 1/26
SHORTNESS OF BREATH: 0
ABDOMINAL PAIN: 0
HEADACHES: 0
NECK PAIN: 1

## 2019-01-27 NOTE — PROGRESS NOTES
Pt. has on cervical collars at all times. 2 RN skin check completed. No skin breakdown noted under brace. Padding in place over bony prominences.

## 2019-01-27 NOTE — PROGRESS NOTES
Patient woke up at 0745 and began asking for pain medication. States Pain 11/10. Pain meds given. Patient very agitated. Gave 2 mg IV ativan. Did not seem to help very much, patient still pacing the halls and having to be redirected and reoriented frequently. Patient short term memory is not in effect at this time. Morning medications also given at this time. Patient has not been verbally aggressive or combative. Patient is smiling and taking the reorientation well. [facility]
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
## 2019-01-27 NOTE — CARE PLAN
Problem: Communication  Goal: The ability to communicate needs accurately and effectively will improve  Outcome: PROGRESSING AS EXPECTED  Pt. Alert and oriented x 4 at this time and able to call for all needs appropriately.     Problem: Safety  Goal: Will remain free from falls  Outcome: PROGRESSING AS EXPECTED  1:1 safety sitter at the bedside. Hourly rounding in place. Call light in reach.

## 2019-01-27 NOTE — PROGRESS NOTES
Patient has become very agitated. Calling Trauma on call to see if can have extra dose of ativan. IV was infiltrated. No idea how much he got in the last dose. Called Haydee and notified her of the patients distress. Haydee said Trauma was not in charge of his Ativan, and/or Psyc Meds. Called Psyc and left message regarding patient's distress. [facility]

## 2019-01-28 LAB
ANION GAP SERPL CALC-SCNC: 5 MMOL/L (ref 0–11.9)
BASOPHILS # BLD AUTO: 0.3 % (ref 0–1.8)
BASOPHILS # BLD: 0.03 K/UL (ref 0–0.12)
BUN SERPL-MCNC: 25 MG/DL (ref 8–22)
CALCIUM SERPL-MCNC: 8.3 MG/DL (ref 8.5–10.5)
CHLORIDE SERPL-SCNC: 107 MMOL/L (ref 96–112)
CO2 SERPL-SCNC: 27 MMOL/L (ref 20–33)
CREAT SERPL-MCNC: 0.85 MG/DL (ref 0.5–1.4)
EOSINOPHIL # BLD AUTO: 0.34 K/UL (ref 0–0.51)
EOSINOPHIL NFR BLD: 3.4 % (ref 0–6.9)
ERYTHROCYTE [DISTWIDTH] IN BLOOD BY AUTOMATED COUNT: 49.7 FL (ref 35.9–50)
GLUCOSE SERPL-MCNC: 95 MG/DL (ref 65–99)
HCT VFR BLD AUTO: 32.9 % (ref 42–52)
HGB BLD-MCNC: 10.5 G/DL (ref 14–18)
IMM GRANULOCYTES # BLD AUTO: 0.09 K/UL (ref 0–0.11)
IMM GRANULOCYTES NFR BLD AUTO: 0.9 % (ref 0–0.9)
IRON SATN MFR SERPL: 18 % (ref 15–55)
IRON SERPL-MCNC: 52 UG/DL (ref 50–180)
LYMPHOCYTES # BLD AUTO: 3.23 K/UL (ref 1–4.8)
LYMPHOCYTES NFR BLD: 32.2 % (ref 22–41)
MAGNESIUM SERPL-MCNC: 1.8 MG/DL (ref 1.5–2.5)
MCH RBC QN AUTO: 30.4 PG (ref 27–33)
MCHC RBC AUTO-ENTMCNC: 31.9 G/DL (ref 33.7–35.3)
MCV RBC AUTO: 95.4 FL (ref 81.4–97.8)
MONOCYTES # BLD AUTO: 0.72 K/UL (ref 0–0.85)
MONOCYTES NFR BLD AUTO: 7.2 % (ref 0–13.4)
NEUTROPHILS # BLD AUTO: 5.63 K/UL (ref 1.82–7.42)
NEUTROPHILS NFR BLD: 56 % (ref 44–72)
NRBC # BLD AUTO: 0 K/UL
NRBC BLD-RTO: 0 /100 WBC
PHOSPHATE SERPL-MCNC: 4.4 MG/DL (ref 2.5–4.5)
PLATELET # BLD AUTO: 328 K/UL (ref 164–446)
PMV BLD AUTO: 8.9 FL (ref 9–12.9)
POTASSIUM SERPL-SCNC: 3.9 MMOL/L (ref 3.6–5.5)
RBC # BLD AUTO: 3.45 M/UL (ref 4.7–6.1)
SODIUM SERPL-SCNC: 139 MMOL/L (ref 135–145)
TIBC SERPL-MCNC: 297 UG/DL (ref 250–450)
WBC # BLD AUTO: 10 K/UL (ref 4.8–10.8)

## 2019-01-28 PROCEDURE — 83735 ASSAY OF MAGNESIUM: CPT

## 2019-01-28 PROCEDURE — 85025 COMPLETE CBC W/AUTO DIFF WBC: CPT

## 2019-01-28 PROCEDURE — 770001 HCHG ROOM/CARE - MED/SURG/GYN PRIV*

## 2019-01-28 PROCEDURE — A9270 NON-COVERED ITEM OR SERVICE: HCPCS | Performed by: NURSE PRACTITIONER

## 2019-01-28 PROCEDURE — 92526 ORAL FUNCTION THERAPY: CPT

## 2019-01-28 PROCEDURE — 99232 SBSQ HOSP IP/OBS MODERATE 35: CPT | Performed by: SURGERY

## 2019-01-28 PROCEDURE — 700111 HCHG RX REV CODE 636 W/ 250 OVERRIDE (IP): Performed by: PSYCHIATRY & NEUROLOGY

## 2019-01-28 PROCEDURE — 700102 HCHG RX REV CODE 250 W/ 637 OVERRIDE(OP): Performed by: NURSE PRACTITIONER

## 2019-01-28 PROCEDURE — 700102 HCHG RX REV CODE 250 W/ 637 OVERRIDE(OP): Performed by: PSYCHIATRY & NEUROLOGY

## 2019-01-28 PROCEDURE — 700111 HCHG RX REV CODE 636 W/ 250 OVERRIDE (IP): Performed by: SURGERY

## 2019-01-28 PROCEDURE — A9270 NON-COVERED ITEM OR SERVICE: HCPCS | Performed by: STUDENT IN AN ORGANIZED HEALTH CARE EDUCATION/TRAINING PROGRAM

## 2019-01-28 PROCEDURE — 83550 IRON BINDING TEST: CPT

## 2019-01-28 PROCEDURE — 36415 COLL VENOUS BLD VENIPUNCTURE: CPT

## 2019-01-28 PROCEDURE — 84100 ASSAY OF PHOSPHORUS: CPT

## 2019-01-28 PROCEDURE — A9270 NON-COVERED ITEM OR SERVICE: HCPCS | Performed by: PSYCHIATRY & NEUROLOGY

## 2019-01-28 PROCEDURE — 700102 HCHG RX REV CODE 250 W/ 637 OVERRIDE(OP): Performed by: STUDENT IN AN ORGANIZED HEALTH CARE EDUCATION/TRAINING PROGRAM

## 2019-01-28 PROCEDURE — 700112 HCHG RX REV CODE 229: Performed by: NURSE PRACTITIONER

## 2019-01-28 PROCEDURE — 83540 ASSAY OF IRON: CPT

## 2019-01-28 PROCEDURE — 80048 BASIC METABOLIC PNL TOTAL CA: CPT

## 2019-01-28 RX ORDER — LORAZEPAM 2 MG/ML
2 INJECTION INTRAMUSCULAR EVERY 6 HOURS PRN
Status: DISCONTINUED | OUTPATIENT
Start: 2019-01-28 | End: 2019-02-01

## 2019-01-28 RX ORDER — OLANZAPINE 5 MG/1
10 TABLET ORAL EVERY EVENING
Status: DISCONTINUED | OUTPATIENT
Start: 2019-01-28 | End: 2019-02-01

## 2019-01-28 RX ORDER — TRAZODONE HYDROCHLORIDE 100 MG/1
50 TABLET ORAL
Status: DISCONTINUED | OUTPATIENT
Start: 2019-01-28 | End: 2019-02-13 | Stop reason: HOSPADM

## 2019-01-28 RX ADMIN — GABAPENTIN 200 MG: 100 CAPSULE ORAL at 17:35

## 2019-01-28 RX ADMIN — LORAZEPAM 1 MG: 1 TABLET ORAL at 18:37

## 2019-01-28 RX ADMIN — TRAMADOL HYDROCHLORIDE 100 MG: 50 TABLET, COATED ORAL at 14:35

## 2019-01-28 RX ADMIN — LORAZEPAM 2 MG: 2 INJECTION INTRAMUSCULAR at 10:42

## 2019-01-28 RX ADMIN — POLYETHYLENE GLYCOL 3350 1 PACKET: 17 POWDER, FOR SOLUTION ORAL at 17:36

## 2019-01-28 RX ADMIN — TRAMADOL HYDROCHLORIDE 100 MG: 50 TABLET, COATED ORAL at 21:36

## 2019-01-28 RX ADMIN — LORAZEPAM 1 MG: 1 TABLET ORAL at 23:43

## 2019-01-28 RX ADMIN — TRAZODONE HYDROCHLORIDE 50 MG: 100 TABLET ORAL at 21:36

## 2019-01-28 RX ADMIN — GABAPENTIN 200 MG: 100 CAPSULE ORAL at 11:32

## 2019-01-28 RX ADMIN — OXYCODONE AND ACETAMINOPHEN 1 TABLET: 5; 325 TABLET ORAL at 11:30

## 2019-01-28 RX ADMIN — OXYCODONE AND ACETAMINOPHEN 1 TABLET: 5; 325 TABLET ORAL at 17:38

## 2019-01-28 RX ADMIN — OLANZAPINE 10 MG: 5 TABLET, FILM COATED ORAL at 17:36

## 2019-01-28 RX ADMIN — OLANZAPINE 5 MG: 5 TABLET, FILM COATED ORAL at 00:45

## 2019-01-28 RX ADMIN — NICOTINE 21 MG: 21 PATCH, EXTENDED RELEASE TRANSDERMAL at 06:06

## 2019-01-28 RX ADMIN — DOCUSATE SODIUM 100 MG: 100 CAPSULE, LIQUID FILLED ORAL at 17:37

## 2019-01-28 RX ADMIN — SENNOSIDES AND DOCUSATE SODIUM 1 TABLET: 8.6; 5 TABLET ORAL at 21:36

## 2019-01-28 RX ADMIN — GABAPENTIN 200 MG: 100 CAPSULE ORAL at 06:07

## 2019-01-28 RX ADMIN — TRAMADOL HYDROCHLORIDE 100 MG: 50 TABLET, COATED ORAL at 08:26

## 2019-01-28 RX ADMIN — OXYCODONE AND ACETAMINOPHEN 1 TABLET: 5; 325 TABLET ORAL at 03:32

## 2019-01-28 RX ADMIN — LORAZEPAM 2 MG: 2 INJECTION INTRAMUSCULAR at 03:32

## 2019-01-28 RX ADMIN — ENOXAPARIN SODIUM 30 MG: 100 INJECTION SUBCUTANEOUS at 18:37

## 2019-01-28 ASSESSMENT — ENCOUNTER SYMPTOMS
FEVER: 0
CHILLS: 0
ROS GI COMMENTS: BM 1/26
NAUSEA: 0
SENSORY CHANGE: 0
ABDOMINAL PAIN: 0
VOMITING: 0
HEADACHES: 0
NECK PAIN: 1
SHORTNESS OF BREATH: 0
DIZZINESS: 0

## 2019-01-28 NOTE — PROGRESS NOTES
Received bedside report from day shift RN. Patient AOx4. Patient complaining of pain, explained to patient that medications are not due until 9pm. Patient very agitated and restless pacing the galindo. Safety sitter at bed side.

## 2019-01-28 NOTE — CARE PLAN
Problem: Safety  Goal: Will remain free from injury  Outcome: PROGRESSING AS EXPECTED  Reinforced safety, use call bell for assistance, bed in lowest position. Safety sitter at bedside.     Problem: Knowledge Deficit  Goal: Knowledge of disease process/condition, treatment plan, diagnostic tests, and medications will improve  Outcome: PROGRESSING AS EXPECTED  Reviewed plan of care with patient. Encouraged to ask questions

## 2019-01-28 NOTE — PROGRESS NOTES
"1900- Patient complaining of pain and not being able to sleep asking for medications.  Explained to patient medications not due until 2100 and 2130. Patient very agitated pacing galindo asking staff for his medications.    2100- Patient was given PRN Percocet   2140- Patient was given PRN Ativan     2200- When checking on patient, patient was sleeping in bed    0030- Patient pacing galindo again asking for his pain and anxiety medication and states \"I can't sleep\". Explained to patient his medications are due every 6 hours and that they are not do until 0300 and 0330. Patient was given PRN Zyprexa to help him sleep. Patient continued to pace hallway stating he can't sleep. Patient was redirected back to bed several times and was encouraged to lay down and let the medication work. Patient stated his bed was \"possessed\" and things are \"jumping\" on him. Patient continued to pace hallway and stated he will wait at the nurses station until 0300am when he could get his medication. Patient remained restless pacing hallway stating \"I can't sleep\"    0330- Patient up to nurses station requesting his medication. Told patient to go back to his room and I would bring medications. Patient was seated in bed while medications were administered. Patient stated he was going to sleep. Approximately 30 seconds after leaving the room heard a noise. Patient was on floor near foot of the bed. Safety sitter stated patient fell \"slowly\" and \"lightly\" trying to get back into the bed. Patient was assisted back to bed. Upon returning to do VS patient sleeping in bed. Post Fall assessment performed. Notified MD, Pharmacy and Charge RN.     9219- Notified by safety sitter that patient took blankets and pillows and laid on the floor to sleep because the  bed is \"lumpy\". Explained to patient he can not sleep on the floor. Bed was changed out for a new one.             "

## 2019-01-28 NOTE — DISCHARGE PLANNING
"Anticipated Discharge Disposition: TBD     Action: Per Unit LSW, sister in [place] is now declining to take pt and recommended pt's adult step-son Brendan Bonds. Three attempts to call son Brendan Bonds and phone is busy. Will continue to try to reach son. Mix reports as sister said Brendan resides in [place] but now is saying he resides in [place].     Unit LSW is contacting insurance to identify SNF's in [facility] ([place]) as pt does not meet rehab criteria.     Barriers to Discharge: lack of support, no identified placement    Plan: F/U on SNF referrals, r/o any family/friend support for safe d/c \"home\".      "
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
## 2019-01-28 NOTE — DISCHARGE PLANNING
Anticipated Discharge Disposition: TBD     Action: LSW requested [place] CANAS to f/u with [facility].    LSW made tc to pt's sister, Jenny ([phone])  She has been speaking with pt and she is unsure if he is willing to come to [place] and since speaking with him she isnt sure she can have him in her home. Jenny stated pt has been speaking to his son, Brendan in [facility]. She suggested this worker speak with him.       Barriers to Discharge: Safe dc plan     Plan: LSW to f/u with [facility]

## 2019-01-28 NOTE — PROGRESS NOTES
· 2 RN skin check complete with BRAULIO Gates.  · Devices in place c-collar, plastic.  · Skin assessed under devices is intact, pink, blanching. Mepilex applied to area of neck where plastic is directly against skin.   · Skin is intact, generalized old, scabbing abrasions on bilateral lower extremities. Dry feet and heels, lotion applied.   · Patient has generalized old bruising.  · The following interventions in place: Assessing points of discomfort under c-collar. Assessing underneath c-collar at least Qshift. Assisting patient with ambulation.

## 2019-01-28 NOTE — PROGRESS NOTES
Received report from BRAULIO Lees. Assumed patient care at this time. Pt A&Ox4, restless and up walking in hallways. Pt denies chest pain, SOB, blurry or double vision, n/v, headache, n/t. Pt c/o neck pain, medicated per orders, see MAR. Patient had fall this am per noc RN, no injuries and no changes in assessment. POC discussed and all questions and concerns addressed at this time. Fall precautions, hourly rounding, and Q4 hour neuro checks in place. 1:1 safety sitter at bedside. Patient has c-collar in place.

## 2019-01-28 NOTE — DISCHARGE PLANNING
Agency/Facility Name: [facility](p:[phone])  Spoke To: [facility]  Outcome: Received fax # from [facility]. Referral sent to f:[phone]

## 2019-01-28 NOTE — THERAPY
"Speech Language Therapy dysphagia treatment completed.   Functional Status:  Pt continues to require frequent cues to clear throat with saliva and during/post swallow.  Behavior is impulsive, with poor insight, poor judgement, poor attn/concentration, poor recall and agitation requiring medical management.  Pt presenting w/ behavior consistent with HI, consistent with RL IV-VI, requiring f/u post acute care d/c.   Recommendations: continue current D2/thin liquid diet without upgrade until pt consistentnly able to recall and follow posted sw/secretion management strategies.    Plan of Care: Will benefit from Speech Therapy 5 times per week  Post-Acute Therapy: Discharge to a transitional care facility for continued skilled therapy services.    See \"Rehab Therapy-Acute\" Patient Summary Report for complete documentation.     "

## 2019-01-29 PROCEDURE — A9270 NON-COVERED ITEM OR SERVICE: HCPCS | Performed by: STUDENT IN AN ORGANIZED HEALTH CARE EDUCATION/TRAINING PROGRAM

## 2019-01-29 PROCEDURE — 700112 HCHG RX REV CODE 229: Performed by: NURSE PRACTITIONER

## 2019-01-29 PROCEDURE — 99233 SBSQ HOSP IP/OBS HIGH 50: CPT | Performed by: PSYCHIATRY & NEUROLOGY

## 2019-01-29 PROCEDURE — 770001 HCHG ROOM/CARE - MED/SURG/GYN PRIV*

## 2019-01-29 PROCEDURE — A9270 NON-COVERED ITEM OR SERVICE: HCPCS | Performed by: NURSE PRACTITIONER

## 2019-01-29 PROCEDURE — 700111 HCHG RX REV CODE 636 W/ 250 OVERRIDE (IP): Performed by: SURGERY

## 2019-01-29 PROCEDURE — 700102 HCHG RX REV CODE 250 W/ 637 OVERRIDE(OP): Performed by: NURSE PRACTITIONER

## 2019-01-29 PROCEDURE — 700102 HCHG RX REV CODE 250 W/ 637 OVERRIDE(OP): Performed by: STUDENT IN AN ORGANIZED HEALTH CARE EDUCATION/TRAINING PROGRAM

## 2019-01-29 PROCEDURE — 700102 HCHG RX REV CODE 250 W/ 637 OVERRIDE(OP): Performed by: PSYCHIATRY & NEUROLOGY

## 2019-01-29 PROCEDURE — A9270 NON-COVERED ITEM OR SERVICE: HCPCS | Performed by: PSYCHIATRY & NEUROLOGY

## 2019-01-29 RX ORDER — ACETAMINOPHEN 325 MG/1
650 TABLET ORAL EVERY 6 HOURS PRN
Status: DISCONTINUED | OUTPATIENT
Start: 2019-01-29 | End: 2019-02-13 | Stop reason: HOSPADM

## 2019-01-29 RX ADMIN — OXYCODONE AND ACETAMINOPHEN 1 TABLET: 5; 325 TABLET ORAL at 18:07

## 2019-01-29 RX ADMIN — LORAZEPAM 1 MG: 1 TABLET ORAL at 11:48

## 2019-01-29 RX ADMIN — OXYCODONE AND ACETAMINOPHEN 1 TABLET: 5; 325 TABLET ORAL at 00:14

## 2019-01-29 RX ADMIN — GABAPENTIN 200 MG: 100 CAPSULE ORAL at 11:49

## 2019-01-29 RX ADMIN — ENOXAPARIN SODIUM 30 MG: 100 INJECTION SUBCUTANEOUS at 18:07

## 2019-01-29 RX ADMIN — LORAZEPAM 1 MG: 1 TABLET ORAL at 18:07

## 2019-01-29 RX ADMIN — NICOTINE 21 MG: 21 PATCH, EXTENDED RELEASE TRANSDERMAL at 06:38

## 2019-01-29 RX ADMIN — ACETAMINOPHEN 650 MG: 325 TABLET, FILM COATED ORAL at 14:12

## 2019-01-29 RX ADMIN — OXYCODONE AND ACETAMINOPHEN 1 TABLET: 5; 325 TABLET ORAL at 11:49

## 2019-01-29 RX ADMIN — OLANZAPINE 10 MG: 5 TABLET, FILM COATED ORAL at 18:09

## 2019-01-29 RX ADMIN — ENOXAPARIN SODIUM 30 MG: 100 INJECTION SUBCUTANEOUS at 11:49

## 2019-01-29 RX ADMIN — GABAPENTIN 200 MG: 100 CAPSULE ORAL at 18:07

## 2019-01-29 RX ADMIN — DOCUSATE SODIUM 100 MG: 100 CAPSULE, LIQUID FILLED ORAL at 18:07

## 2019-01-29 RX ADMIN — TRAMADOL HYDROCHLORIDE 100 MG: 50 TABLET, COATED ORAL at 19:31

## 2019-01-29 RX ADMIN — DOCUSATE SODIUM 100 MG: 100 CAPSULE, LIQUID FILLED ORAL at 11:48

## 2019-01-29 RX ADMIN — TRAMADOL HYDROCHLORIDE 100 MG: 50 TABLET, COATED ORAL at 12:30

## 2019-01-29 ASSESSMENT — ENCOUNTER SYMPTOMS
ABDOMINAL PAIN: 0
CHILLS: 0
NECK PAIN: 1
VOMITING: 0
HEADACHES: 0
SHORTNESS OF BREATH: 0
SENSORY CHANGE: 0
ROS GI COMMENTS: BM 1/28
FEVER: 0
DIZZINESS: 0
NAUSEA: 0

## 2019-01-29 NOTE — PROGRESS NOTES
2 RN skin check: skin checked under hard collar. Intact, redness, blanching. Mepilex applied. Sacrum pink, blanches.

## 2019-01-29 NOTE — THERAPY
Attempted to see pt for OT tx. Pt currently sleeping, per RN did not sleep last night. Will try again later as appropriate.

## 2019-01-29 NOTE — PROGRESS NOTES
Contacted neurosurgery about whether patient is cleared to receive NSAIDs. Spoke to Alexus ALMANZA pt is safe to receive NSAIDs at this time.

## 2019-01-29 NOTE — CARE PLAN
Problem: Safety  Goal: Will remain free from injury  Outcome: PROGRESSING AS EXPECTED  1:1 safety sitter at bedside. Patient assisted when ambulating with SBA. Patient had bed alarm on for beginning of shift because patient would still just stand up and start going even with sitter present. This RN wanted to make sure patient was more stable on his feet before d/c the bed alarm with sitter at bedside. Patient became more steady during shift when pain medication and Ativan spread out by at least 30 minutes. Patient has bed locked and in low position. Call light and belongings in reach. No falls during this shift. Patient educated with reinforcement often throughout shift regarding fall prevention and fall program interventions.    Problem: Bowel/Gastric:  Goal: Will not experience complications related to bowel motility  Outcome: PROGRESSING AS EXPECTED  Patient had bowel movement during this RN's shift and was passing gas most of the shift. Per patient bowel movement was regular, not loose, so this RN gave scheduled bowel medications to prevent constipation with narcotic pain medications.    Problem: Pain Management  Goal: Pain level will decrease to patient's comfort goal  Outcome: PROGRESSING SLOWER THAN EXPECTED  Patient c/o pain throughout shift in neck, back, and shoulders. Patient medicated per orders, see MAR. Patient had positive results with medications, however, results did not last very long and patient c/o increasing pain again. Provided patient other non pharmacological pain interventions such as distraction, ambulation, offered food, cold packs, hot packs, etc. Patient educated on pain medications and appropriate dosage times to prevent dizziness and falls.    Problem: Psychosocial Needs:  Goal: Level of anxiety will decrease  Outcome: PROGRESSING SLOWER THAN EXPECTED  Patient was restless and became a little agitated with staff members during the morning related to his needs not being immediately  fulfilled. This RN and another RN explained that we would help the patient but it might not be immediate. Patient given PRN dose of Ativan for agitation, IV instead of PO per patient request. Dr. Villarreal came to see patient today and explained how she wants him to try PO Ativan next time he becomes agitated and restless because the results would be longer lasting. Patient verbalized understanding. Dr. Villarreal also ordered scheduled trazodone to assist patient in falling asleep as he stated he did not get much last night.

## 2019-01-29 NOTE — DISCHARGE PLANNING
Agency/Facility Name: [facility], [facility], [facility], [facility], [facility]  Plan or Request: Referral sent to [place] SNFs listed above per Sanjana's(LSW) request.
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
## 2019-01-29 NOTE — PROGRESS NOTES
"Pt demanding \"IV pain\" meds and demanding to speak to \"someone higher\" than this RN. Pt was educated on POC that was set up by day shift care team, try oral meds first before trying IV Ativan. Pt stated \"I can't wait for the oral to kick in, I need the IV med so it'll work faster. I'll just follow you around.\" Pt is difficult to reason with and distract. Pt has been offered food, ambulation, and oral Zyprexa. Pt finally redirected to eat food, ambulate, and take Zyprexa.  "

## 2019-01-29 NOTE — CARE PLAN
Problem: Safety  Goal: Will remain free from injury  Outcome: PROGRESSING SLOWER THAN EXPECTED  Reviewed patient's mobility status, discussed with care team of patient needs, verifying appropriate safety precautions in place, providing patient education, ensuring call lights are within reach, non-slip socks in use, evaluating needs alarms & monitoring every shift, continuing with current plan of care.      Problem: Knowledge Deficit  Goal: Knowledge of the prescribed therapeutic regimen will improve  Outcome: PROGRESSING SLOWER THAN EXPECTED  Educated patient about POC, activities, encouraging patient to ask questions, providing answers to patient's questions, educating patient about medications, encouraging patient involvement in care process. Continuing with current POC.

## 2019-01-29 NOTE — DISCHARGE PLANNING
Anticipated Discharge Disposition: TBD    Action: LSW called pts health insurance, [facility] ([phone]) to identify SNFs in [facility]. LSW spoke with Leida who provided 7 SNFs that pts insurance is contracted with. LSW emailed list to Francy.     Leida @ [facility] provided LSW pts new billing number. LSW provided new B# to P[employer] agent Kaci Rao. B# updated.     Barriers to Discharge: Placement    Plan: LSW await SNF responses
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
## 2019-01-29 NOTE — PROGRESS NOTES
2000 - Pt asleep after c/o not being able to sleep. PSA is at bedside. Will continue to monitor.    2115 - Pt awake and asking for Jello. Also given Scheduled PM meds and Tramadol PRN. Pt back to room w/ PSA.    2200 - Pt asleep. PSA at bedside.    2230 - Pt ate mashed potatoes, veggies. Watching TV in bed, calm. PSA bedside.    0015 - pt trying to call wife, asking this RN why he's being transferred to another facility, demanding to speak with charge RN. Using distraction, ambulation, food, to calm patient down.

## 2019-01-30 PROCEDURE — 770001 HCHG ROOM/CARE - MED/SURG/GYN PRIV*

## 2019-01-30 PROCEDURE — A9270 NON-COVERED ITEM OR SERVICE: HCPCS | Performed by: NURSE PRACTITIONER

## 2019-01-30 PROCEDURE — 700102 HCHG RX REV CODE 250 W/ 637 OVERRIDE(OP): Performed by: NURSE PRACTITIONER

## 2019-01-30 PROCEDURE — A9270 NON-COVERED ITEM OR SERVICE: HCPCS | Performed by: PSYCHIATRY & NEUROLOGY

## 2019-01-30 PROCEDURE — 700111 HCHG RX REV CODE 636 W/ 250 OVERRIDE (IP): Performed by: SURGERY

## 2019-01-30 PROCEDURE — 700111 HCHG RX REV CODE 636 W/ 250 OVERRIDE (IP): Performed by: PSYCHIATRY & NEUROLOGY

## 2019-01-30 PROCEDURE — 97535 SELF CARE MNGMENT TRAINING: CPT

## 2019-01-30 PROCEDURE — 99232 SBSQ HOSP IP/OBS MODERATE 35: CPT | Performed by: SURGERY

## 2019-01-30 PROCEDURE — 700102 HCHG RX REV CODE 250 W/ 637 OVERRIDE(OP): Performed by: PSYCHIATRY & NEUROLOGY

## 2019-01-30 PROCEDURE — 700102 HCHG RX REV CODE 250 W/ 637 OVERRIDE(OP): Performed by: STUDENT IN AN ORGANIZED HEALTH CARE EDUCATION/TRAINING PROGRAM

## 2019-01-30 PROCEDURE — A9270 NON-COVERED ITEM OR SERVICE: HCPCS | Performed by: STUDENT IN AN ORGANIZED HEALTH CARE EDUCATION/TRAINING PROGRAM

## 2019-01-30 PROCEDURE — 700112 HCHG RX REV CODE 229: Performed by: NURSE PRACTITIONER

## 2019-01-30 RX ADMIN — TRAMADOL HYDROCHLORIDE 100 MG: 50 TABLET, COATED ORAL at 19:45

## 2019-01-30 RX ADMIN — TRAZODONE HYDROCHLORIDE 50 MG: 100 TABLET ORAL at 20:19

## 2019-01-30 RX ADMIN — GABAPENTIN 200 MG: 100 CAPSULE ORAL at 12:43

## 2019-01-30 RX ADMIN — LORAZEPAM 2 MG: 2 INJECTION INTRAMUSCULAR; INTRAVENOUS at 15:05

## 2019-01-30 RX ADMIN — LORAZEPAM 1 MG: 1 TABLET ORAL at 10:43

## 2019-01-30 RX ADMIN — LORAZEPAM 2 MG: 2 INJECTION INTRAMUSCULAR; INTRAVENOUS at 22:48

## 2019-01-30 RX ADMIN — GABAPENTIN 200 MG: 100 CAPSULE ORAL at 17:06

## 2019-01-30 RX ADMIN — NICOTINE 21 MG: 21 PATCH, EXTENDED RELEASE TRANSDERMAL at 04:48

## 2019-01-30 RX ADMIN — GABAPENTIN 200 MG: 100 CAPSULE ORAL at 04:50

## 2019-01-30 RX ADMIN — OXYCODONE AND ACETAMINOPHEN 1 TABLET: 5; 325 TABLET ORAL at 14:33

## 2019-01-30 RX ADMIN — DOCUSATE SODIUM 100 MG: 100 CAPSULE, LIQUID FILLED ORAL at 04:51

## 2019-01-30 RX ADMIN — OLANZAPINE 10 MG: 5 TABLET, FILM COATED ORAL at 17:05

## 2019-01-30 RX ADMIN — LORAZEPAM 1 MG: 1 TABLET ORAL at 21:54

## 2019-01-30 RX ADMIN — OXYCODONE AND ACETAMINOPHEN 1 TABLET: 5; 325 TABLET ORAL at 08:17

## 2019-01-30 RX ADMIN — ENOXAPARIN SODIUM 30 MG: 100 INJECTION SUBCUTANEOUS at 04:53

## 2019-01-30 RX ADMIN — TRAMADOL HYDROCHLORIDE 100 MG: 50 TABLET, COATED ORAL at 10:19

## 2019-01-30 RX ADMIN — LORAZEPAM 1 MG: 1 TABLET ORAL at 04:48

## 2019-01-30 RX ADMIN — ENOXAPARIN SODIUM 30 MG: 100 INJECTION SUBCUTANEOUS at 18:00

## 2019-01-30 RX ADMIN — MAGNESIUM HYDROXIDE 30 ML: 400 SUSPENSION ORAL at 04:51

## 2019-01-30 ASSESSMENT — COGNITIVE AND FUNCTIONAL STATUS - GENERAL
DAILY ACTIVITIY SCORE: 24
SUGGESTED CMS G CODE MODIFIER DAILY ACTIVITY: CH

## 2019-01-30 ASSESSMENT — ENCOUNTER SYMPTOMS
FOCAL WEAKNESS: 1
CONSTITUTIONAL NEGATIVE: 1
ROS GI COMMENTS: BM 1/28
RESPIRATORY NEGATIVE: 1
NECK PAIN: 1

## 2019-01-30 NOTE — CARE PLAN
Problem: Safety  Goal: Will remain free from injury  Outcome: PROGRESSING AS EXPECTED  Sitter present    Problem: Mobility  Goal: Risk for activity intolerance will decrease  Outcome: PROGRESSING AS EXPECTED  Up with SBA, steady gait

## 2019-01-30 NOTE — PROGRESS NOTES
Pt pacing hallway, refusing to go back to room. Asking for something to help him relax. Starting to get very agitated. Gave him 2mg Ativan. Explained that the medical team is trying to wean him off of his medications and that he will not be able to go home on IV meds. He says that he understands. More calm now. Agrees to lay in bed and rest.

## 2019-01-30 NOTE — PROGRESS NOTES
2 RN skin check: hard cervical collar in place at all times, redness on right shoulder w/ Mepilex applied. Skin intact on right shoulder, blanching. All other skin WDL.

## 2019-01-30 NOTE — THERAPY
"Occupational Therapy Treatment completed with focus on ADLs.  Functional Status:  Pt found sitting in bed. Pt participated in simulated medication management task. Required assist w/ opening medication bottles and pill box. Difficulty managing daily schedule of medication, required max cues to put appropriate pills in correct day slot in pill case. Required Max cues to reread instructions to be able to appropriately complete and understand tasks. Difficulty w/ two step instructions of AM and PM schedules. Will continue to benefit from OT services while in house to address IADLs. At this time, pt requires Max A for medication management and will benefit from 24/7 supervision upon appropriate d/c home.   Plan of Care: Will benefit from Occupational Therapy 1 times per week  Discharge Recommendations:  Equipment Will Continue to Assess for Equipment Needs.     See \"Rehab Therapy-Acute\" Patient Summary Report for complete documentation.   "

## 2019-01-30 NOTE — DISCHARGE PLANNING
Agency/Facility Name: [facility]  Outcome: Patient declined- no open beds.
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
## 2019-01-30 NOTE — DISCHARGE PLANNING
Anticipated Discharge Disposition: TBD    Action: LSW called Brendan Bonds ([phone]) new # that pt's sister provided. Mariangel, pt's daughter-in-law who is  to pt's other son, Valentino, answered. Mariangel provided LSW Brendan's # ([phone]). LSW called and LM for Brendan.     LSW called Mariangel back and went straight to . LSW LM asking for a call back.      Barriers to Discharge: SNF acceptance on [place], lack of family communication    Plan: f/u with family & SNF referrals

## 2019-01-30 NOTE — PROGRESS NOTES
2 RN skin check   Blanching redness under c-collar, foam in place. All other skin clean, dry intact.

## 2019-01-30 NOTE — PROGRESS NOTES
Pt AAOx4, Shanice, C/O neck pain, meds given per MAR. Up with SBA, steady gait. Pt reports eating and voiding. Slightly slurred speech unchanged. C-Collar in place. VSS

## 2019-01-31 PROCEDURE — 700102 HCHG RX REV CODE 250 W/ 637 OVERRIDE(OP): Performed by: STUDENT IN AN ORGANIZED HEALTH CARE EDUCATION/TRAINING PROGRAM

## 2019-01-31 PROCEDURE — A9270 NON-COVERED ITEM OR SERVICE: HCPCS | Performed by: NURSE PRACTITIONER

## 2019-01-31 PROCEDURE — 770001 HCHG ROOM/CARE - MED/SURG/GYN PRIV*

## 2019-01-31 PROCEDURE — A9270 NON-COVERED ITEM OR SERVICE: HCPCS | Performed by: STUDENT IN AN ORGANIZED HEALTH CARE EDUCATION/TRAINING PROGRAM

## 2019-01-31 PROCEDURE — A9270 NON-COVERED ITEM OR SERVICE: HCPCS | Performed by: PSYCHIATRY & NEUROLOGY

## 2019-01-31 PROCEDURE — 700102 HCHG RX REV CODE 250 W/ 637 OVERRIDE(OP): Performed by: PSYCHIATRY & NEUROLOGY

## 2019-01-31 PROCEDURE — 700111 HCHG RX REV CODE 636 W/ 250 OVERRIDE (IP): Performed by: SURGERY

## 2019-01-31 PROCEDURE — 700102 HCHG RX REV CODE 250 W/ 637 OVERRIDE(OP): Performed by: NURSE PRACTITIONER

## 2019-01-31 RX ORDER — ONDANSETRON 4 MG/1
4 TABLET, ORALLY DISINTEGRATING ORAL EVERY 4 HOURS PRN
Status: DISCONTINUED | OUTPATIENT
Start: 2019-01-31 | End: 2019-02-13 | Stop reason: HOSPADM

## 2019-01-31 RX ADMIN — ENOXAPARIN SODIUM 30 MG: 100 INJECTION SUBCUTANEOUS at 06:00

## 2019-01-31 RX ADMIN — NICOTINE 21 MG: 21 PATCH, EXTENDED RELEASE TRANSDERMAL at 05:56

## 2019-01-31 RX ADMIN — GABAPENTIN 200 MG: 100 CAPSULE ORAL at 05:56

## 2019-01-31 RX ADMIN — TRAMADOL HYDROCHLORIDE 100 MG: 50 TABLET, COATED ORAL at 22:34

## 2019-01-31 RX ADMIN — LORAZEPAM 1 MG: 1 TABLET ORAL at 13:55

## 2019-01-31 RX ADMIN — OXYCODONE AND ACETAMINOPHEN 1 TABLET: 5; 325 TABLET ORAL at 13:12

## 2019-01-31 RX ADMIN — ENOXAPARIN SODIUM 30 MG: 100 INJECTION SUBCUTANEOUS at 18:33

## 2019-01-31 RX ADMIN — LORAZEPAM 1 MG: 1 TABLET ORAL at 08:08

## 2019-01-31 RX ADMIN — GABAPENTIN 200 MG: 100 CAPSULE ORAL at 12:04

## 2019-01-31 RX ADMIN — OLANZAPINE 10 MG: 5 TABLET, FILM COATED ORAL at 18:33

## 2019-01-31 RX ADMIN — TRAZODONE HYDROCHLORIDE 50 MG: 100 TABLET ORAL at 20:08

## 2019-01-31 RX ADMIN — OXYCODONE AND ACETAMINOPHEN 1 TABLET: 5; 325 TABLET ORAL at 06:45

## 2019-01-31 RX ADMIN — GABAPENTIN 200 MG: 100 CAPSULE ORAL at 18:33

## 2019-01-31 RX ADMIN — OXYCODONE AND ACETAMINOPHEN 1 TABLET: 5; 325 TABLET ORAL at 19:21

## 2019-01-31 ASSESSMENT — ENCOUNTER SYMPTOMS
NECK PAIN: 1
ROS GI COMMENTS: BM 1/30
RESPIRATORY NEGATIVE: 1
CONSTITUTIONAL NEGATIVE: 1
FOCAL WEAKNESS: 0
NEUROLOGICAL NEGATIVE: 1

## 2019-01-31 NOTE — PROGRESS NOTES
Pt still asking for pain meds and anxiety meds frequently. Pt states he just wants to sleep and is unable to. Pt reassured about medication regimen and scheduled trazadone later this evening.

## 2019-01-31 NOTE — DISCHARGE PLANNING
"Anticipated Discharge Disposition: TBD    Action: LSW called pt's son, Brendan Bonds ([phone]). LSW tried to explain to Brendan his fathers d/c plan/needs. Brendan quickly stated \"give me 3-4 days then I'll come.\" Brendan then hung up on LSW while LSW was speaking.     LSW tried to call Brendan back & phone went straight to . LSW LM.     Barriers to Discharge: SNF acceptance & families unwillingness to participate with d/c.     Plan: LSW await SNF acceptance & contact family members    "

## 2019-01-31 NOTE — PROGRESS NOTES
"1930: Patient stating he is has 10/10 neck pain. Tramadol given at 1945.     2145: Patient stating he is unable to sleep and is feeling anxious. Patient requesting IV Ativan stating \"the pill doesn't work\" Education provided. PO given.     2200: Patient removed IV.     2230: New IV placed. Patient stated he is still anxious. Patient declined other comfort measures to reduce anxiety, requesting IV ativan. Ativan 2mg IV given.     2315: Patient following RN stating he is still anxious and unable to sleep. RN redirected patient to room. Safety sitter called RN stating patient is trying to sleep on the ground. Patient removed c-collar. Patient redirect back to bed. C-collar placed back on patient.     2345: Patient sleeping in bed.     Patient slept the rest of the night. 1:1 safety sitter at bedside. Hourly rounding in place.   "
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
## 2019-01-31 NOTE — DISCHARGE PLANNING
Anticipated Discharge Disposition: TBD    Action: LSW requested [facility] to f/u with SNF referrals.     Barriers to Discharge: SNF acceptance. Family unengaged.     Plan: LSW await SNF acceptance. LSW to contact family, again.

## 2019-01-31 NOTE — CARE PLAN
Problem: Psychosocial Needs:  Goal: Level of anxiety will decrease  Outcome: PROGRESSING SLOWER THAN EXPECTED  meds given per MAR and reassurance given    Problem: Skin Integrity  Goal: Risk for impaired skin integrity will decrease  Outcome: PROGRESSING AS EXPECTED  Pt turns self and ambulates frequently

## 2019-01-31 NOTE — CARE PLAN
Problem: Safety  Goal: Will remain free from injury  Outcome: PROGRESSING AS EXPECTED  Patient ambulating around unit with a steady gait, SBA and c-collar in place. 1:1 safety sitter at bedside.     Problem: Pain Management  Goal: Pain level will decrease to patient's comfort goal  Outcome: PROGRESSING SLOWER THAN EXPECTED  Pain assessed, medication given per MAR.     Problem: Psychosocial Needs:  Goal: Level of anxiety will decrease  Outcome: PROGRESSING SLOWER THAN EXPECTED  Ativan given PO and IV. Patient refusing other anxiety reducing measures.

## 2019-02-01 PROCEDURE — 700102 HCHG RX REV CODE 250 W/ 637 OVERRIDE(OP): Performed by: NURSE PRACTITIONER

## 2019-02-01 PROCEDURE — 700111 HCHG RX REV CODE 636 W/ 250 OVERRIDE (IP): Performed by: SURGERY

## 2019-02-01 PROCEDURE — A9270 NON-COVERED ITEM OR SERVICE: HCPCS | Performed by: PSYCHIATRY & NEUROLOGY

## 2019-02-01 PROCEDURE — A9270 NON-COVERED ITEM OR SERVICE: HCPCS | Performed by: NURSE PRACTITIONER

## 2019-02-01 PROCEDURE — A9270 NON-COVERED ITEM OR SERVICE: HCPCS | Performed by: STUDENT IN AN ORGANIZED HEALTH CARE EDUCATION/TRAINING PROGRAM

## 2019-02-01 PROCEDURE — 700102 HCHG RX REV CODE 250 W/ 637 OVERRIDE(OP): Performed by: PSYCHIATRY & NEUROLOGY

## 2019-02-01 PROCEDURE — 700111 HCHG RX REV CODE 636 W/ 250 OVERRIDE (IP): Performed by: NURSE PRACTITIONER

## 2019-02-01 PROCEDURE — 99232 SBSQ HOSP IP/OBS MODERATE 35: CPT | Performed by: PSYCHIATRY & NEUROLOGY

## 2019-02-01 PROCEDURE — 700111 HCHG RX REV CODE 636 W/ 250 OVERRIDE (IP): Performed by: PSYCHIATRY & NEUROLOGY

## 2019-02-01 PROCEDURE — 770001 HCHG ROOM/CARE - MED/SURG/GYN PRIV*

## 2019-02-01 PROCEDURE — 700102 HCHG RX REV CODE 250 W/ 637 OVERRIDE(OP): Performed by: STUDENT IN AN ORGANIZED HEALTH CARE EDUCATION/TRAINING PROGRAM

## 2019-02-01 RX ORDER — OLANZAPINE 5 MG/1
15 TABLET ORAL EVERY EVENING
Status: DISCONTINUED | OUTPATIENT
Start: 2019-02-02 | End: 2019-02-13 | Stop reason: HOSPADM

## 2019-02-01 RX ORDER — OLANZAPINE 5 MG/1
5 TABLET ORAL ONCE
Status: COMPLETED | OUTPATIENT
Start: 2019-02-01 | End: 2019-02-01

## 2019-02-01 RX ORDER — CLONAZEPAM 0.5 MG/1
0.5 TABLET ORAL 3 TIMES DAILY
Status: DISCONTINUED | OUTPATIENT
Start: 2019-02-01 | End: 2019-02-13 | Stop reason: HOSPADM

## 2019-02-01 RX ORDER — GABAPENTIN 300 MG/1
300 CAPSULE ORAL 3 TIMES DAILY
Status: DISCONTINUED | OUTPATIENT
Start: 2019-02-02 | End: 2019-02-13 | Stop reason: HOSPADM

## 2019-02-01 RX ORDER — ZIPRASIDONE MESYLATE 20 MG/ML
10 INJECTION, POWDER, LYOPHILIZED, FOR SOLUTION INTRAMUSCULAR EVERY 4 HOURS PRN
Status: DISCONTINUED | OUTPATIENT
Start: 2019-02-01 | End: 2019-02-13 | Stop reason: HOSPADM

## 2019-02-01 RX ORDER — LORAZEPAM 2 MG/ML
2 INJECTION INTRAMUSCULAR EVERY 6 HOURS PRN
Status: DISCONTINUED | OUTPATIENT
Start: 2019-02-01 | End: 2019-02-01

## 2019-02-01 RX ADMIN — GABAPENTIN 200 MG: 100 CAPSULE ORAL at 11:45

## 2019-02-01 RX ADMIN — ZIPRASIDONE MESYLATE 10 MG: 20 INJECTION, POWDER, LYOPHILIZED, FOR SOLUTION INTRAMUSCULAR at 21:31

## 2019-02-01 RX ADMIN — OLANZAPINE 10 MG: 5 TABLET, FILM COATED ORAL at 16:51

## 2019-02-01 RX ADMIN — CLONAZEPAM 0.5 MG: 0.5 TABLET ORAL at 16:04

## 2019-02-01 RX ADMIN — OXYCODONE AND ACETAMINOPHEN 1 TABLET: 5; 325 TABLET ORAL at 08:03

## 2019-02-01 RX ADMIN — OLANZAPINE 5 MG: 5 TABLET, FILM COATED ORAL at 23:26

## 2019-02-01 RX ADMIN — OXYCODONE AND ACETAMINOPHEN 1 TABLET: 5; 325 TABLET ORAL at 14:14

## 2019-02-01 RX ADMIN — NICOTINE POLACRILEX 2 MG: 2 GUM, CHEWING BUCCAL at 11:07

## 2019-02-01 RX ADMIN — ENOXAPARIN SODIUM 30 MG: 100 INJECTION SUBCUTANEOUS at 16:52

## 2019-02-01 RX ADMIN — OLANZAPINE 5 MG: 5 TABLET, FILM COATED ORAL at 13:41

## 2019-02-01 RX ADMIN — NICOTINE 21 MG: 21 PATCH, EXTENDED RELEASE TRANSDERMAL at 08:04

## 2019-02-01 RX ADMIN — TRAZODONE HYDROCHLORIDE 50 MG: 100 TABLET ORAL at 17:43

## 2019-02-01 RX ADMIN — CLONAZEPAM 0.5 MG: 0.5 TABLET ORAL at 21:31

## 2019-02-01 RX ADMIN — TRAMADOL HYDROCHLORIDE 100 MG: 50 TABLET, COATED ORAL at 22:20

## 2019-02-01 RX ADMIN — GABAPENTIN 200 MG: 100 CAPSULE ORAL at 16:51

## 2019-02-01 RX ADMIN — LORAZEPAM 1 MG: 1 TABLET ORAL at 08:55

## 2019-02-01 RX ADMIN — ENOXAPARIN SODIUM 30 MG: 100 INJECTION SUBCUTANEOUS at 08:02

## 2019-02-01 RX ADMIN — LORAZEPAM 2 MG: 2 INJECTION INTRAMUSCULAR; INTRAVENOUS at 11:07

## 2019-02-01 RX ADMIN — TRAMADOL HYDROCHLORIDE 100 MG: 50 TABLET, COATED ORAL at 09:49

## 2019-02-01 RX ADMIN — GABAPENTIN 200 MG: 100 CAPSULE ORAL at 08:03

## 2019-02-01 RX ADMIN — TRAMADOL HYDROCHLORIDE 100 MG: 50 TABLET, COATED ORAL at 16:06

## 2019-02-01 ASSESSMENT — ENCOUNTER SYMPTOMS
ROS GI COMMENTS: BM 1/30
CONSTITUTIONAL NEGATIVE: 1
HEADACHES: 1
FOCAL WEAKNESS: 0
NECK PAIN: 1
RESPIRATORY NEGATIVE: 1

## 2019-02-01 NOTE — PROGRESS NOTES
Pt AAOx4, up ad antonio with steady gait. Irritable/anxious at times. Still asks for pain and anxiety meds frequently. Frequently expresses desire to leave, reassurance given. RPt reports having normal bowel movements, refuses stool softeners/bowel meds despite counseling, belly firm on assessment. Pt removed IV today for second time, APRN notified, ok with no IV. C-collar in place.
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
## 2019-02-01 NOTE — PROGRESS NOTES
"Assumed care of pt at 0700.   Pt is A&Ox4.   Pt denies n/v and n/t.   Pt complains of pain \"8/10 to posterior neck\", medication given per MAR.   1:1 safety sitter at bedside.  Plan of care discussed.   Hourly rounding in place.   "
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
## 2019-02-01 NOTE — CARE PLAN
Problem: Safety  Goal: Will remain free from injury  Outcome: PROGRESSING AS EXPECTED  Treaded socks in place. 1:1 sitter at bedside. Bed locked and in lowest position. Call light within reach.     Problem: Pain Management  Goal: Pain level will decrease to patient's comfort goal  Outcome: PROGRESSING SLOWER THAN EXPECTED  PRN pain medication in use.

## 2019-02-01 NOTE — CARE PLAN
Problem: Safety  Goal: Will remain free from injury  Outcome: PROGRESSING SLOWER THAN EXPECTED      Problem: Knowledge Deficit  Goal: Knowledge of the prescribed therapeutic regimen will improve  Outcome: PROGRESSING SLOWER THAN EXPECTED      Problem: Discharge Barriers/Planning  Goal: Patient's continuum of care needs will be met  Outcome: PROGRESSING SLOWER THAN EXPECTED

## 2019-02-01 NOTE — DISCHARGE PLANNING
Anticipated Discharge Disposition: SNF    Action: LSW requested [facility] f/u with outstanding SNF referrals    Barriers to Discharge: SNF Acceptance    Plan: LSW to f/u with [facility]

## 2019-02-01 NOTE — DISCHARGE PLANNING
"Anticipated Discharge Disposition: TBD    Action: LSW PA w/ pt's son, Brendan Bonds ([phone]). Phone went straight to .     LSW LM w/ Mariangel ([phone]) pt's daughter-in-law trying to speak with pt's other son, Valentino \"Vinney\"     LSW called pt's sister, Meg ([phone]) who stated that pt has 5 children: Brendan, Darcie, Jerome, El, Yamileth and Lance. Meg didn't know the correct order of the children but knows Brendan is the oldest. Meg stated that all the children are on drugs. Meg informed LSW that Brendan Bonds and his wife, Clementina Bonds live in [place]. She also informed LSW that Darcie and his wife (Mariangel) live in [place]. Meg does not know the locations of the other children. Meg provided Clementina Bonds's # ([phone]) to LSW. Meg also stated that pt's cousin, Kalpesh \"[place]\" resides in [place]. Meg doesn't know his #. LSW explained d/c needs & plan w/ Meg. Meg declined to provide care for pt after d/c.     LSW called Clementina Bonds ([phone]). Clementina is aware of pt in hospital. LSW informed Clementina of pt's d/c plan & needs. Clementina stated that pt should be placed in a SNF vs. Home w/ family. LSW informed Clementina that pt is pending SNF acceptance in the [place] area. Clementina stated pt has cousin (Kalpesh aka Lisa Cool) in [place] that can possible assist with transportation. LSW asked about Kalpesh & pt's relationship and Clementina stated \"Kalpesh should be willing to help.\" LSW discussed with Clementina pt potentially d/c to Kalpesh's home & requested Kalpesh's # and Clementina agreed to call LSW back to provide #. LSWs contact number provided to Clementina. Clementina also stated that if pt gets accepted to SNF they would pay for a flight to [place] and possibly Kalpesh accompany him. LSW requested a call back from Brendan as well. Clementina agreed.     LSW looked in pts demographics under pt relationships. Kalpesh is listed however no #. Once LSW receives Kalpesh's # LSW will update patient relation info.      Barriers to Discharge: SNF " acceptance. Families unwilling to assist w/ d/c.     Plan: Await for call back from Clementina Bonds getting Kalpesh's #. Per Clementina, Kalpesh may assist with d/c transportation and/or placement in home with 24/7 care. LSW to call Kalpesh. Await SNF acceptance.

## 2019-02-01 NOTE — DISCHARGE PLANNING
Anticipated Discharge Disposition: TBD    Action: LSW received a VM from  José Luis Clancy ([phone]) asking about pt. LSW called  [facility] and LM.     Barriers to Discharge: SNF acceptance    Plan: Await call back from reyna Clancy, pending SNF acceptance. Call family.

## 2019-02-02 PROCEDURE — 770001 HCHG ROOM/CARE - MED/SURG/GYN PRIV*

## 2019-02-02 PROCEDURE — A9270 NON-COVERED ITEM OR SERVICE: HCPCS | Performed by: NURSE PRACTITIONER

## 2019-02-02 PROCEDURE — A9270 NON-COVERED ITEM OR SERVICE: HCPCS | Performed by: PSYCHIATRY & NEUROLOGY

## 2019-02-02 PROCEDURE — 700102 HCHG RX REV CODE 250 W/ 637 OVERRIDE(OP): Performed by: NURSE PRACTITIONER

## 2019-02-02 PROCEDURE — A9270 NON-COVERED ITEM OR SERVICE: HCPCS | Performed by: STUDENT IN AN ORGANIZED HEALTH CARE EDUCATION/TRAINING PROGRAM

## 2019-02-02 PROCEDURE — 700111 HCHG RX REV CODE 636 W/ 250 OVERRIDE (IP): Performed by: SURGERY

## 2019-02-02 PROCEDURE — 700112 HCHG RX REV CODE 229: Performed by: NURSE PRACTITIONER

## 2019-02-02 PROCEDURE — 700102 HCHG RX REV CODE 250 W/ 637 OVERRIDE(OP): Performed by: STUDENT IN AN ORGANIZED HEALTH CARE EDUCATION/TRAINING PROGRAM

## 2019-02-02 PROCEDURE — 700102 HCHG RX REV CODE 250 W/ 637 OVERRIDE(OP): Performed by: PSYCHIATRY & NEUROLOGY

## 2019-02-02 RX ADMIN — TRAMADOL HYDROCHLORIDE 100 MG: 50 TABLET, COATED ORAL at 10:09

## 2019-02-02 RX ADMIN — MAGNESIUM HYDROXIDE 30 ML: 400 SUSPENSION ORAL at 06:00

## 2019-02-02 RX ADMIN — OLANZAPINE 15 MG: 5 TABLET, FILM COATED ORAL at 16:54

## 2019-02-02 RX ADMIN — CLONAZEPAM 0.5 MG: 0.5 TABLET ORAL at 15:18

## 2019-02-02 RX ADMIN — POLYETHYLENE GLYCOL 3350 1 PACKET: 17 POWDER, FOR SOLUTION ORAL at 06:35

## 2019-02-02 RX ADMIN — TRAMADOL HYDROCHLORIDE 100 MG: 50 TABLET, COATED ORAL at 16:52

## 2019-02-02 RX ADMIN — GABAPENTIN 300 MG: 300 CAPSULE ORAL at 06:34

## 2019-02-02 RX ADMIN — NICOTINE 21 MG: 21 PATCH, EXTENDED RELEASE TRANSDERMAL at 06:35

## 2019-02-02 RX ADMIN — DOCUSATE SODIUM 100 MG: 100 CAPSULE, LIQUID FILLED ORAL at 16:53

## 2019-02-02 RX ADMIN — OXYCODONE AND ACETAMINOPHEN 1 TABLET: 5; 325 TABLET ORAL at 20:37

## 2019-02-02 RX ADMIN — ANTACID TABLETS 500 MG: 500 TABLET, CHEWABLE ORAL at 22:32

## 2019-02-02 RX ADMIN — GABAPENTIN 300 MG: 300 CAPSULE ORAL at 16:53

## 2019-02-02 RX ADMIN — CLONAZEPAM 0.5 MG: 0.5 TABLET ORAL at 20:38

## 2019-02-02 RX ADMIN — ENOXAPARIN SODIUM 30 MG: 100 INJECTION SUBCUTANEOUS at 16:53

## 2019-02-02 RX ADMIN — TRAMADOL HYDROCHLORIDE 100 MG: 50 TABLET, COATED ORAL at 23:00

## 2019-02-02 RX ADMIN — CLONAZEPAM 0.5 MG: 0.5 TABLET ORAL at 10:09

## 2019-02-02 RX ADMIN — OXYCODONE AND ACETAMINOPHEN 1 TABLET: 5; 325 TABLET ORAL at 08:06

## 2019-02-02 RX ADMIN — ENOXAPARIN SODIUM 30 MG: 100 INJECTION SUBCUTANEOUS at 06:35

## 2019-02-02 RX ADMIN — TRAZODONE HYDROCHLORIDE 50 MG: 100 TABLET ORAL at 20:38

## 2019-02-02 RX ADMIN — GABAPENTIN 300 MG: 300 CAPSULE ORAL at 13:13

## 2019-02-02 RX ADMIN — POLYETHYLENE GLYCOL 3350 1 PACKET: 17 POWDER, FOR SOLUTION ORAL at 16:53

## 2019-02-02 RX ADMIN — DOCUSATE SODIUM 100 MG: 100 CAPSULE, LIQUID FILLED ORAL at 06:34

## 2019-02-02 RX ADMIN — OXYCODONE AND ACETAMINOPHEN 1 TABLET: 5; 325 TABLET ORAL at 14:32

## 2019-02-02 ASSESSMENT — ENCOUNTER SYMPTOMS
NECK PAIN: 1
CONSTITUTIONAL NEGATIVE: 1
FOCAL WEAKNESS: 0
RESPIRATORY NEGATIVE: 1

## 2019-02-02 NOTE — PROGRESS NOTES
Pt continues to be anxious following [place]  and reporting he is going to leave. Pt also reporting pain in neck. Pt administered  10mg of tramadol for 10/10 pain. Encouraged pt to return to room with sitter. Pt is refusing and continues to ambulate in galindo way. Will continue to monitor.
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
## 2019-02-02 NOTE — CARE PLAN
Problem: Safety  Goal: Will remain free from injury  Pt has 1:1 sitter at all times and will remain free from injury.    Problem: Knowledge Deficit  Goal: Knowledge of disease process/condition, treatment plan, diagnostic tests, and medications will improve  Outcome: PROGRESSING SLOWER THAN EXPECTED  Pt requires reinforcement of plan of care.

## 2019-02-02 NOTE — PROGRESS NOTES
"Assumed care of pt at 0700.   Pt is A&Ox4.   Pt complains of pain \"6/10 to posterior neck\", medication given per MAR.   C collar in place at all times.   1:1 sitter at bedside.   Plan of care discussed.   Hourly rounding in place.   "

## 2019-02-02 NOTE — PROGRESS NOTES
Bed side report taken from day nurse. Sitter at bedside. Pt asleep. Bed lowered, locked and call light within reach.

## 2019-02-02 NOTE — PROGRESS NOTES
Pt continued to roam around in galindo with sitter. Pt continues to complain he cannot go to sleep and will leaved soon if unable to sleep. Pt ordered to go back to room and this nurse would give him something to help him sleep. Pt continued to argue but eventually went to room. Prn dose of Zyprexa 5mg was administered at 2326. Pt finally fell asleep around 12:45. 1:1 sitter at bedside. Pt resting well. Bed lowered, and locked.
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
## 2019-02-02 NOTE — PROGRESS NOTES
"Pt very anxious, restless and stating \" I feel like running, I'm going to run out of here!\" pt refusing to return to room. Pt reports hard to breath and its because he wants to leave.  Talked pt into returning to room and 10mg IM Geodon was administered. Will continue to monitor.   "

## 2019-02-03 ENCOUNTER — APPOINTMENT (OUTPATIENT)
Dept: RADIOLOGY | Facility: MEDICAL CENTER | Age: 44
DRG: 963 | End: 2019-02-03
Attending: NURSE PRACTITIONER
Payer: OTHER MISCELLANEOUS

## 2019-02-03 PROCEDURE — 700111 HCHG RX REV CODE 636 W/ 250 OVERRIDE (IP): Performed by: SURGERY

## 2019-02-03 PROCEDURE — A9270 NON-COVERED ITEM OR SERVICE: HCPCS | Performed by: STUDENT IN AN ORGANIZED HEALTH CARE EDUCATION/TRAINING PROGRAM

## 2019-02-03 PROCEDURE — 72040 X-RAY EXAM NECK SPINE 2-3 VW: CPT

## 2019-02-03 PROCEDURE — A9270 NON-COVERED ITEM OR SERVICE: HCPCS | Performed by: NURSE PRACTITIONER

## 2019-02-03 PROCEDURE — 700102 HCHG RX REV CODE 250 W/ 637 OVERRIDE(OP): Performed by: STUDENT IN AN ORGANIZED HEALTH CARE EDUCATION/TRAINING PROGRAM

## 2019-02-03 PROCEDURE — 700102 HCHG RX REV CODE 250 W/ 637 OVERRIDE(OP): Performed by: NURSE PRACTITIONER

## 2019-02-03 PROCEDURE — 700112 HCHG RX REV CODE 229: Performed by: NURSE PRACTITIONER

## 2019-02-03 PROCEDURE — 700102 HCHG RX REV CODE 250 W/ 637 OVERRIDE(OP): Performed by: PSYCHIATRY & NEUROLOGY

## 2019-02-03 PROCEDURE — 770001 HCHG ROOM/CARE - MED/SURG/GYN PRIV*

## 2019-02-03 PROCEDURE — A9270 NON-COVERED ITEM OR SERVICE: HCPCS | Performed by: PSYCHIATRY & NEUROLOGY

## 2019-02-03 RX ADMIN — CLONAZEPAM 0.5 MG: 0.5 TABLET ORAL at 15:58

## 2019-02-03 RX ADMIN — OLANZAPINE 15 MG: 5 TABLET, FILM COATED ORAL at 16:53

## 2019-02-03 RX ADMIN — ANTACID TABLETS 500 MG: 500 TABLET, CHEWABLE ORAL at 23:13

## 2019-02-03 RX ADMIN — SENNOSIDES AND DOCUSATE SODIUM 1 TABLET: 8.6; 5 TABLET ORAL at 20:35

## 2019-02-03 RX ADMIN — GABAPENTIN 300 MG: 300 CAPSULE ORAL at 11:31

## 2019-02-03 RX ADMIN — DOCUSATE SODIUM 100 MG: 100 CAPSULE, LIQUID FILLED ORAL at 08:50

## 2019-02-03 RX ADMIN — ENOXAPARIN SODIUM 30 MG: 100 INJECTION SUBCUTANEOUS at 08:47

## 2019-02-03 RX ADMIN — OXYCODONE AND ACETAMINOPHEN 1 TABLET: 5; 325 TABLET ORAL at 21:55

## 2019-02-03 RX ADMIN — DOCUSATE SODIUM 100 MG: 100 CAPSULE, LIQUID FILLED ORAL at 16:53

## 2019-02-03 RX ADMIN — OXYCODONE AND ACETAMINOPHEN 1 TABLET: 5; 325 TABLET ORAL at 08:48

## 2019-02-03 RX ADMIN — TRAZODONE HYDROCHLORIDE 50 MG: 100 TABLET ORAL at 20:35

## 2019-02-03 RX ADMIN — GABAPENTIN 300 MG: 300 CAPSULE ORAL at 08:48

## 2019-02-03 RX ADMIN — TRAMADOL HYDROCHLORIDE 100 MG: 50 TABLET, COATED ORAL at 10:33

## 2019-02-03 RX ADMIN — CLONAZEPAM 0.5 MG: 0.5 TABLET ORAL at 10:33

## 2019-02-03 RX ADMIN — TRAMADOL HYDROCHLORIDE 100 MG: 50 TABLET, COATED ORAL at 20:34

## 2019-02-03 RX ADMIN — OXYCODONE AND ACETAMINOPHEN 1 TABLET: 5; 325 TABLET ORAL at 15:58

## 2019-02-03 RX ADMIN — CLONAZEPAM 0.5 MG: 0.5 TABLET ORAL at 21:55

## 2019-02-03 RX ADMIN — NICOTINE 21 MG: 21 PATCH, EXTENDED RELEASE TRANSDERMAL at 08:48

## 2019-02-03 RX ADMIN — GABAPENTIN 300 MG: 300 CAPSULE ORAL at 16:53

## 2019-02-03 RX ADMIN — ENOXAPARIN SODIUM 30 MG: 100 INJECTION SUBCUTANEOUS at 16:53

## 2019-02-03 ASSESSMENT — ENCOUNTER SYMPTOMS
NECK PAIN: 1
CONSTITUTIONAL NEGATIVE: 1
ROS GI COMMENTS: BM 2/1
RESPIRATORY NEGATIVE: 1
FOCAL WEAKNESS: 0

## 2019-02-03 NOTE — CARE PLAN
Problem: Safety  Goal: Will remain free from injury    Intervention: Provide assistance with mobility  Pt will remain free from injury. Pt with 1:1 sitter that ambulates with him  In galindo.      Problem: Pain Management  Goal: Pain level will decrease to patient's comfort goal    Intervention: Educate and implement non-pharmacologic comfort measures. Examples: relaxation, distration, play therapy, activity therapy, massage, etc.  Encouraged pt to try and relax for a period of time to help with neck pain.

## 2019-02-03 NOTE — CARE PLAN
Problem: Safety  Goal: Will remain free from injury  Outcome: PROGRESSING AS EXPECTED  Treaded socks in place. 1:1 sitter at bedside. Bed locked and in lowest position. Call light within reach.     Problem: Mobility  Goal: Risk for activity intolerance will decrease  Outcome: PROGRESSING AS EXPECTED  Pt ambulating up self with steady gait.
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
## 2019-02-03 NOTE — PROGRESS NOTES
"Pt up in galindo demanding a \"shot\". Informed pt it wasn't time for a shot. Pt states he has pain in neck. Informed pt to go to room and calm down and I would bring his pain medication as soon as it was due. Pt continues to verbalize that he is going to leave. Instructed pt to go back to room and wait for me. Sitter at bedside.  "
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
## 2019-02-03 NOTE — PROGRESS NOTES
"Assumed care of pt at 0700.   Pt is A&Ox4.   Pt complains of pain \"8/10 to posterior neck\", medication given per MAR.   Pt is upself to bathroom with steady gait.   1:1 sitter at bedside for safety.   C-collar in place at all times.  Plan of care discussed.   Hourly rounding in place.  "

## 2019-02-04 PROCEDURE — 700102 HCHG RX REV CODE 250 W/ 637 OVERRIDE(OP): Performed by: NURSE PRACTITIONER

## 2019-02-04 PROCEDURE — A9270 NON-COVERED ITEM OR SERVICE: HCPCS | Performed by: NURSE PRACTITIONER

## 2019-02-04 PROCEDURE — 700102 HCHG RX REV CODE 250 W/ 637 OVERRIDE(OP): Performed by: PSYCHIATRY & NEUROLOGY

## 2019-02-04 PROCEDURE — 700111 HCHG RX REV CODE 636 W/ 250 OVERRIDE (IP): Performed by: SURGERY

## 2019-02-04 PROCEDURE — 700102 HCHG RX REV CODE 250 W/ 637 OVERRIDE(OP): Performed by: STUDENT IN AN ORGANIZED HEALTH CARE EDUCATION/TRAINING PROGRAM

## 2019-02-04 PROCEDURE — A9270 NON-COVERED ITEM OR SERVICE: HCPCS | Performed by: PSYCHIATRY & NEUROLOGY

## 2019-02-04 PROCEDURE — 700112 HCHG RX REV CODE 229: Performed by: NURSE PRACTITIONER

## 2019-02-04 PROCEDURE — 770001 HCHG ROOM/CARE - MED/SURG/GYN PRIV*

## 2019-02-04 PROCEDURE — A9270 NON-COVERED ITEM OR SERVICE: HCPCS | Performed by: STUDENT IN AN ORGANIZED HEALTH CARE EDUCATION/TRAINING PROGRAM

## 2019-02-04 RX ADMIN — ENOXAPARIN SODIUM 30 MG: 100 INJECTION SUBCUTANEOUS at 06:23

## 2019-02-04 RX ADMIN — DOCUSATE SODIUM 100 MG: 100 CAPSULE, LIQUID FILLED ORAL at 06:26

## 2019-02-04 RX ADMIN — OXYCODONE AND ACETAMINOPHEN 1 TABLET: 5; 325 TABLET ORAL at 09:49

## 2019-02-04 RX ADMIN — CLONAZEPAM 0.5 MG: 0.5 TABLET ORAL at 16:27

## 2019-02-04 RX ADMIN — GABAPENTIN 300 MG: 300 CAPSULE ORAL at 06:24

## 2019-02-04 RX ADMIN — GABAPENTIN 300 MG: 300 CAPSULE ORAL at 20:11

## 2019-02-04 RX ADMIN — POLYETHYLENE GLYCOL 3350 1 PACKET: 17 POWDER, FOR SOLUTION ORAL at 16:27

## 2019-02-04 RX ADMIN — TRAMADOL HYDROCHLORIDE 50 MG: 50 TABLET, COATED ORAL at 12:34

## 2019-02-04 RX ADMIN — GABAPENTIN 300 MG: 300 CAPSULE ORAL at 14:02

## 2019-02-04 RX ADMIN — CLONAZEPAM 0.5 MG: 0.5 TABLET ORAL at 09:51

## 2019-02-04 RX ADMIN — TRAMADOL HYDROCHLORIDE 100 MG: 50 TABLET, COATED ORAL at 22:26

## 2019-02-04 RX ADMIN — TRAMADOL HYDROCHLORIDE 100 MG: 50 TABLET, COATED ORAL at 06:25

## 2019-02-04 RX ADMIN — SENNOSIDES AND DOCUSATE SODIUM 1 TABLET: 8.6; 5 TABLET ORAL at 21:08

## 2019-02-04 RX ADMIN — OXYCODONE AND ACETAMINOPHEN 1 TABLET: 5; 325 TABLET ORAL at 16:26

## 2019-02-04 RX ADMIN — CLONAZEPAM 0.5 MG: 0.5 TABLET ORAL at 20:11

## 2019-02-04 RX ADMIN — ANTACID TABLETS 500 MG: 500 TABLET, CHEWABLE ORAL at 12:32

## 2019-02-04 RX ADMIN — SENNOSIDES AND DOCUSATE SODIUM 1 TABLET: 8.6; 5 TABLET ORAL at 20:11

## 2019-02-04 RX ADMIN — OLANZAPINE 15 MG: 5 TABLET, FILM COATED ORAL at 16:28

## 2019-02-04 RX ADMIN — TRAZODONE HYDROCHLORIDE 50 MG: 100 TABLET ORAL at 20:11

## 2019-02-04 RX ADMIN — ENOXAPARIN SODIUM 30 MG: 100 INJECTION SUBCUTANEOUS at 16:27

## 2019-02-04 RX ADMIN — NICOTINE 21 MG: 21 PATCH, EXTENDED RELEASE TRANSDERMAL at 06:22

## 2019-02-04 RX ADMIN — DOCUSATE SODIUM 100 MG: 100 CAPSULE, LIQUID FILLED ORAL at 16:27

## 2019-02-04 ASSESSMENT — ENCOUNTER SYMPTOMS
ROS GI COMMENTS: BM 2/2
NECK PAIN: 1
CONSTITUTIONAL NEGATIVE: 1
FOCAL WEAKNESS: 0
RESPIRATORY NEGATIVE: 1

## 2019-02-04 NOTE — PROGRESS NOTES
Neurosurgery Progress Note    Subjective:  C/O: Axial neck pain, denies bilateral upper or lower extremity pain, numbness, weakness. Ambulating with therapies in galindo. No changes in symptoms after GLF 2 days ago. Patient eager to discharge.   Voiding, passing flatus  Pain well controlled on oral medications      Exam:  VSS  A&Ox4, NAD  Cervical collar being worn appropriately  Trachea midline, no difficulty swallowing - no coughing or choking while drinking water   No nuchal rigidity   NM: 5/5 deltoid, biceps, triceps, handgrip, intrinsics   Sensation intact and equal throughout all four extremities.   Pulmonary: non-labored breathing on room air, normal respiratory effort  No LE edema, erythema, cyanosis, clubbing  Calves non-tender to compression bilat    BP  Min: 99/68  Max: 123/83  Pulse  Av.5  Min: 89  Max: 105  Resp  Av  Min: 20  Max: 20  Temp  Av.6 °C (97.9 °F)  Min: 36.6 °C (97.8 °F)  Max: 36.7 °C (98 °F)  SpO2  Av.5 %  Min: 94 %  Max: 97 %    No Data Recorded                      Intake/Output       19 - 19 0659 19 07 - 19 0659       [id number] Total  6297-4637 Total       Intake    Total Intake -- -- -- -- -- --       Output    Urine  --  -- --  --  -- --    Number of Times Voided 2 x -- 2 x -- -- --    Total Output -- -- -- -- -- --       Net I/O     -- -- -- -- -- --          No intake or output data in the 24 hours ending 19 0823         • nicotine polacrilex  2 mg Q2HRS PRN   • ziprasidone  10 mg Q4HRS PRN   • clonazePAM  0.5 mg TID   • gabapentin  300 mg TID   • OLANZapine  15 mg Q EVENING   • ondansetron  4 mg Q4HRS PRN   • acetaminophen  650 mg Q6HRS PRN   • traZODone  50 mg QHS   • tramadol   mg Q6HRS PRN   • oxyCODONE-acetaminophen  1 Tab Q6HRS PRN   • OLANZapine  5 mg HS PRN   • calcium carbonate  500 mg TID PRN   • nicotine  21 mg Daily-0600   • enoxaparin (LOVENOX) injection  30 mg Q12HRS   • Respiratory Care per  Protocol   Continuous RT   • Pharmacy Consult Request  1 Each PRN   • docusate sodium  100 mg BID   • senna-docusate  1 Tab Nightly   • senna-docusate  1 Tab Q24HRS PRN   • polyethylene glycol/lytes  1 Packet BID   • magnesium hydroxide  30 mL DAILY   • bisacodyl  10 mg Q24HRS PRN   • fleet  1 Each Once PRN       Assessment and Plan:  Hospital day #30  Prophylactic anticoagulation: yes         Start date/time: per trauma/hospitalist  Recent cervical spine x-rays after GLF 2 days ago reveal: Displaced fractures of the tips of the spinous processes of C5 and C6 appear unchanged.  Neuro intact.  No acute fracture is identified.  Will likely require long term bracing as patient is a current smoker.   Pain management per trauma.   Ok to D/c to rehab  Recommend continued bracing, XR and f/u in 2-3 weeks  Will follow peripherally   D/w Dr. Coleman

## 2019-02-04 NOTE — CARE PLAN
Problem: Safety  Goal: Will remain free from injury  Outcome: PROGRESSING SLOWER THAN EXPECTED  Pt will remain free from injury. Pt has 1:1 sitter.    Problem: Knowledge Deficit  Goal: Knowledge of disease process/condition, treatment plan, diagnostic tests, and medications will improve  Outcome: PROGRESSING SLOWER THAN EXPECTED  Pt requires reinforcement of plan of care.
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
## 2019-02-04 NOTE — PROGRESS NOTES
2 RN's at bedside. Neck brace removed for skin assessment. Skin intact under brace. No other issues noted.

## 2019-02-05 PROCEDURE — 97535 SELF CARE MNGMENT TRAINING: CPT

## 2019-02-05 PROCEDURE — A9270 NON-COVERED ITEM OR SERVICE: HCPCS | Performed by: PSYCHIATRY & NEUROLOGY

## 2019-02-05 PROCEDURE — A9270 NON-COVERED ITEM OR SERVICE: HCPCS | Performed by: NURSE PRACTITIONER

## 2019-02-05 PROCEDURE — 700102 HCHG RX REV CODE 250 W/ 637 OVERRIDE(OP): Performed by: NURSE PRACTITIONER

## 2019-02-05 PROCEDURE — 700112 HCHG RX REV CODE 229: Performed by: NURSE PRACTITIONER

## 2019-02-05 PROCEDURE — G0515 COGNITIVE SKILLS DEVELOPMENT: HCPCS

## 2019-02-05 PROCEDURE — 92526 ORAL FUNCTION THERAPY: CPT

## 2019-02-05 PROCEDURE — A9270 NON-COVERED ITEM OR SERVICE: HCPCS | Performed by: STUDENT IN AN ORGANIZED HEALTH CARE EDUCATION/TRAINING PROGRAM

## 2019-02-05 PROCEDURE — 700102 HCHG RX REV CODE 250 W/ 637 OVERRIDE(OP): Performed by: STUDENT IN AN ORGANIZED HEALTH CARE EDUCATION/TRAINING PROGRAM

## 2019-02-05 PROCEDURE — 700102 HCHG RX REV CODE 250 W/ 637 OVERRIDE(OP): Performed by: PSYCHIATRY & NEUROLOGY

## 2019-02-05 PROCEDURE — 770001 HCHG ROOM/CARE - MED/SURG/GYN PRIV*

## 2019-02-05 PROCEDURE — 700111 HCHG RX REV CODE 636 W/ 250 OVERRIDE (IP): Performed by: SURGERY

## 2019-02-05 RX ORDER — IBUPROFEN 600 MG/1
600 TABLET ORAL EVERY 6 HOURS PRN
Status: DISCONTINUED | OUTPATIENT
Start: 2019-02-05 | End: 2019-02-13 | Stop reason: HOSPADM

## 2019-02-05 RX ADMIN — SENNOSIDES AND DOCUSATE SODIUM 1 TABLET: 8.6; 5 TABLET ORAL at 20:10

## 2019-02-05 RX ADMIN — TRAMADOL HYDROCHLORIDE 100 MG: 50 TABLET, COATED ORAL at 20:53

## 2019-02-05 RX ADMIN — OXYCODONE AND ACETAMINOPHEN 1 TABLET: 5; 325 TABLET ORAL at 22:13

## 2019-02-05 RX ADMIN — DOCUSATE SODIUM 100 MG: 100 CAPSULE, LIQUID FILLED ORAL at 18:00

## 2019-02-05 RX ADMIN — OLANZAPINE 15 MG: 5 TABLET, FILM COATED ORAL at 18:28

## 2019-02-05 RX ADMIN — ENOXAPARIN SODIUM 30 MG: 100 INJECTION SUBCUTANEOUS at 09:20

## 2019-02-05 RX ADMIN — CLONAZEPAM 0.5 MG: 0.5 TABLET ORAL at 20:09

## 2019-02-05 RX ADMIN — ACETAMINOPHEN 650 MG: 325 TABLET, FILM COATED ORAL at 13:58

## 2019-02-05 RX ADMIN — DOCUSATE SODIUM 100 MG: 100 CAPSULE, LIQUID FILLED ORAL at 09:18

## 2019-02-05 RX ADMIN — OXYCODONE AND ACETAMINOPHEN 1 TABLET: 5; 325 TABLET ORAL at 01:22

## 2019-02-05 RX ADMIN — POLYETHYLENE GLYCOL 3350 1 PACKET: 17 POWDER, FOR SOLUTION ORAL at 18:27

## 2019-02-05 RX ADMIN — ANTACID TABLETS 500 MG: 500 TABLET, CHEWABLE ORAL at 20:26

## 2019-02-05 RX ADMIN — CLONAZEPAM 0.5 MG: 0.5 TABLET ORAL at 09:20

## 2019-02-05 RX ADMIN — NICOTINE 21 MG: 21 PATCH, EXTENDED RELEASE TRANSDERMAL at 09:20

## 2019-02-05 RX ADMIN — OXYCODONE AND ACETAMINOPHEN 1 TABLET: 5; 325 TABLET ORAL at 09:19

## 2019-02-05 RX ADMIN — TRAMADOL HYDROCHLORIDE 100 MG: 50 TABLET, COATED ORAL at 12:01

## 2019-02-05 RX ADMIN — GABAPENTIN 300 MG: 300 CAPSULE ORAL at 09:19

## 2019-02-05 RX ADMIN — ACETAMINOPHEN 650 MG: 325 TABLET, FILM COATED ORAL at 21:43

## 2019-02-05 RX ADMIN — OXYCODONE AND ACETAMINOPHEN 1 TABLET: 5; 325 TABLET ORAL at 16:02

## 2019-02-05 RX ADMIN — GABAPENTIN 300 MG: 300 CAPSULE ORAL at 12:01

## 2019-02-05 RX ADMIN — CLONAZEPAM 0.5 MG: 0.5 TABLET ORAL at 18:28

## 2019-02-05 RX ADMIN — TRAZODONE HYDROCHLORIDE 50 MG: 100 TABLET ORAL at 20:09

## 2019-02-05 RX ADMIN — GABAPENTIN 300 MG: 300 CAPSULE ORAL at 18:27

## 2019-02-05 RX ADMIN — OLANZAPINE 5 MG: 5 TABLET, FILM COATED ORAL at 01:47

## 2019-02-05 ASSESSMENT — ENCOUNTER SYMPTOMS
RESPIRATORY NEGATIVE: 1
ROS GI COMMENTS: BM 2/4
CONSTITUTIONAL NEGATIVE: 1
NECK PAIN: 1
FOCAL WEAKNESS: 0

## 2019-02-05 NOTE — THERAPY
"Occupational Therapy Treatment completed with focus on cognition.  Functional Status:  Pt seen for OT tx. Pt completed simulated medication management task w/ max cues and setup. Pt able to read directions out loud appropriately but required repeated cues to follow directions correctly. Pt forgetful w/ directions and required cues to re-read pill bottles. After reading pill bottles pt able to verbalize where simulated pills were to appropriately go but once he opened the bottle he would forget what days he was supposed to put them in. Pt able to differentiate AM/PM schedule and improved ability to open and manipulate pill bottles today from previous session. Pt continues to have impaired cognition limiting ability to complete IADLs w/o assistance. Continue to recommend that pt would benefit from 24/7 supv upon appropriate d/c home.   Plan of Care: Will benefit from Occupational Therapy 1 times per week  Discharge Recommendations:  Equipment Will Continue to Assess for Equipment Needs.     See \"Rehab Therapy-Acute\" Patient Summary Report for complete documentation.   "
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
## 2019-02-05 NOTE — PROGRESS NOTES
2 RN skin check completed. Pt's skin assessed under c-collar. Skin is pink and blanching. Otherwise skin is intact.

## 2019-02-05 NOTE — PROGRESS NOTES
Pt A&Ox4  Pt having constant pain in neck and tooth  Requesting oral surgeon consult at this time due to tooth pain  Pt agitated and anxious at times requesting multiple pain medications and threatening to go to ED to receive pain medication  Pt needs constant reinforcement and education regarding pain management  1:1 sitter at bedside for safety.   C-collar in place at all times  Gait steady with SBA  Hourly rounding completed

## 2019-02-05 NOTE — DISCHARGE PLANNING
Anticipated Discharge Disposition: SNF     Action: LSW requested [facility] f/u with outstanding SNF referrals.     Barriers to Discharge: SNF Acceptance; Safe DC Plan; Unengaged Family     Plan: LSW to f/u with [facility]
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
## 2019-02-05 NOTE — CARE PLAN
Problem: Safety  Goal: Will remain free from injury  Outcome: PROGRESSING AS EXPECTED  1:1 sitter at bedside. Sitter has eyes on pt at all times even when in the bathroom. Pt's gait steady. Hourly rounding completed.     Problem: Psychosocial Needs:  Goal: Level of anxiety will decrease  Outcome: PROGRESSING AS EXPECTED  Pt having anxiety due to tooth pain. Pt educated on pain management plan. PRN medication given per eMAR.

## 2019-02-06 PROCEDURE — A9270 NON-COVERED ITEM OR SERVICE: HCPCS | Performed by: NURSE PRACTITIONER

## 2019-02-06 PROCEDURE — 700111 HCHG RX REV CODE 636 W/ 250 OVERRIDE (IP): Performed by: SURGERY

## 2019-02-06 PROCEDURE — 770001 HCHG ROOM/CARE - MED/SURG/GYN PRIV*

## 2019-02-06 PROCEDURE — 700102 HCHG RX REV CODE 250 W/ 637 OVERRIDE(OP): Performed by: NURSE PRACTITIONER

## 2019-02-06 PROCEDURE — 700102 HCHG RX REV CODE 250 W/ 637 OVERRIDE(OP): Performed by: PSYCHIATRY & NEUROLOGY

## 2019-02-06 PROCEDURE — 700112 HCHG RX REV CODE 229: Performed by: NURSE PRACTITIONER

## 2019-02-06 PROCEDURE — 700111 HCHG RX REV CODE 636 W/ 250 OVERRIDE (IP): Performed by: NURSE PRACTITIONER

## 2019-02-06 PROCEDURE — 700102 HCHG RX REV CODE 250 W/ 637 OVERRIDE(OP): Performed by: STUDENT IN AN ORGANIZED HEALTH CARE EDUCATION/TRAINING PROGRAM

## 2019-02-06 PROCEDURE — A9270 NON-COVERED ITEM OR SERVICE: HCPCS | Performed by: STUDENT IN AN ORGANIZED HEALTH CARE EDUCATION/TRAINING PROGRAM

## 2019-02-06 PROCEDURE — A9270 NON-COVERED ITEM OR SERVICE: HCPCS | Performed by: PSYCHIATRY & NEUROLOGY

## 2019-02-06 RX ORDER — NICOTINE 21 MG/24HR
14 PATCH, TRANSDERMAL 24 HOURS TRANSDERMAL
Status: DISCONTINUED | OUTPATIENT
Start: 2019-02-07 | End: 2019-02-13 | Stop reason: HOSPADM

## 2019-02-06 RX ADMIN — ONDANSETRON 4 MG: 4 TABLET, ORALLY DISINTEGRATING ORAL at 19:51

## 2019-02-06 RX ADMIN — TRAMADOL HYDROCHLORIDE 100 MG: 50 TABLET, COATED ORAL at 17:50

## 2019-02-06 RX ADMIN — ENOXAPARIN SODIUM 30 MG: 100 INJECTION SUBCUTANEOUS at 06:13

## 2019-02-06 RX ADMIN — OXYCODONE AND ACETAMINOPHEN 1 TABLET: 5; 325 TABLET ORAL at 07:40

## 2019-02-06 RX ADMIN — OLANZAPINE 5 MG: 5 TABLET, FILM COATED ORAL at 00:42

## 2019-02-06 RX ADMIN — CLONAZEPAM 0.5 MG: 0.5 TABLET ORAL at 11:26

## 2019-02-06 RX ADMIN — GABAPENTIN 300 MG: 300 CAPSULE ORAL at 11:26

## 2019-02-06 RX ADMIN — NICOTINE 21 MG: 21 PATCH, EXTENDED RELEASE TRANSDERMAL at 06:13

## 2019-02-06 RX ADMIN — ACETAMINOPHEN 650 MG: 325 TABLET, FILM COATED ORAL at 20:29

## 2019-02-06 RX ADMIN — DOCUSATE SODIUM 100 MG: 100 CAPSULE, LIQUID FILLED ORAL at 17:51

## 2019-02-06 RX ADMIN — CLONAZEPAM 0.5 MG: 0.5 TABLET ORAL at 16:02

## 2019-02-06 RX ADMIN — GABAPENTIN 300 MG: 300 CAPSULE ORAL at 06:12

## 2019-02-06 RX ADMIN — TRAZODONE HYDROCHLORIDE 50 MG: 100 TABLET ORAL at 20:30

## 2019-02-06 RX ADMIN — IBUPROFEN 600 MG: 600 TABLET ORAL at 06:12

## 2019-02-06 RX ADMIN — GABAPENTIN 300 MG: 300 CAPSULE ORAL at 17:50

## 2019-02-06 RX ADMIN — TRAMADOL HYDROCHLORIDE 50 MG: 50 TABLET, COATED ORAL at 11:24

## 2019-02-06 RX ADMIN — ENOXAPARIN SODIUM 30 MG: 100 INJECTION SUBCUTANEOUS at 17:49

## 2019-02-06 RX ADMIN — OXYCODONE AND ACETAMINOPHEN 1 TABLET: 5; 325 TABLET ORAL at 15:28

## 2019-02-06 RX ADMIN — OLANZAPINE 15 MG: 5 TABLET, FILM COATED ORAL at 18:48

## 2019-02-06 RX ADMIN — OLANZAPINE 5 MG: 5 TABLET, FILM COATED ORAL at 17:50

## 2019-02-06 RX ADMIN — DOCUSATE SODIUM 100 MG: 100 CAPSULE, LIQUID FILLED ORAL at 06:12

## 2019-02-06 RX ADMIN — POLYETHYLENE GLYCOL 3350 1 PACKET: 17 POWDER, FOR SOLUTION ORAL at 06:12

## 2019-02-06 RX ADMIN — CLONAZEPAM 0.5 MG: 0.5 TABLET ORAL at 21:34

## 2019-02-06 ASSESSMENT — ENCOUNTER SYMPTOMS
ROS GI COMMENTS: 2/5 BM
FEVER: 0
NECK PAIN: 1
SHORTNESS OF BREATH: 0
CHILLS: 0
ABDOMINAL PAIN: 0
SENSORY CHANGE: 0
FOCAL WEAKNESS: 0
NAUSEA: 0

## 2019-02-06 NOTE — PROGRESS NOTES
· 2 RN skin check complete with BRAULIO Cody.  · Devices in place C-collar.  · Skin assessed under devices intact. Skin is pink, blanching. Skin otherwise intact.  · The following interventions in place: encouraging patient to ambulate during shift, encouraging patient to let staff know if he feels any discomfort with brace, assessing underneath brace every shift.
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
## 2019-02-06 NOTE — THERAPY
"Speech Language Therapy dysphagia treatment completed.   Functional Status:  Patient currently on Dys2/thin liquid diet and per RN, patient appears to be tolerating diet without difficulty. Patient consumed PO trials from Dys2/thin liquid diet dinner tray. Patient consumed approximately 7-8 bites without difficulty. Patient then consumed PO trials of mixed consistencies, crackers, and thin liquids via cup sip and straw with no overt s/sx of aspiration. Patient required moderate verbal cues to reduce bite/sip size and rate, d/t impulsivity. Patient had wet/gurgly vocal quality x1 several minutes after cessation of PO trials, which appeared related to saliva, as patient reports he talks so much that he forgets to swallow his saliva and his vocal quality gets wet/gurgly or he drools. Laryngeal elevation palpated as complete. At this time, recommend patient upgrade to regular diet with thin liquids with intermittent supervision. RN aware. SLP is following.     Recommendations: At this time, recommend patient upgrade to regular diet with thin liquids with intermittent supervision.   Plan of Care: Will benefit from Speech Therapy 5 times per week  Post-Acute Therapy: Recommend inpatient transitional care services for continued speech therapy services.      See \"Rehab Therapy-Acute\" Patient Summary Report for complete documentation.     "

## 2019-02-06 NOTE — DISCHARGE PLANNING
"Anticipated Discharge Disposition: SNF     Action: LSW requested [facility] f/u with outstanding SNF referrals.    LSW met with pt, Flash and his , José Luis Garcia, bedside. Flash does not have any family in [place]. He has his cousin, Kalpesh here in [place] ([phone]). Flash is in agreement to drop his [employer] and apply for [place] Medicaid as he has no dc support in [place]. The rest of his family is spread out between, [place] (Sister), [place] (Ex & Son) and [place] (Son & DIL). Flash's truck is in the impound lot in [place]. His wallet with his id and money are located inside.  Flash is in agreement that he needs further SLP/OT therapy and is happy to be told that he isn't going to a psych facility. Flash asked this worker to write on his white board \"No [facility]\". Flash will have a conversation with his cousin Kalpesh after dc from SNF.     , José Luis Garcia  [phone]  [email or web address]    LSW emailed PFA for information on proceeding with pt canceling Medi-[employer]ryan and screening him for [place] Medicaid.    LSW emailed [facility] office for any needed forms to disenroll pt from [email or web address]     Barriers to Discharge: SNF Acceptance; Safe DC Plan; Unengaged Family; [employer] Insurance     Plan: LSW to f/u with PFA  "

## 2019-02-06 NOTE — PROGRESS NOTES
Pt A&Ox4   Vitals stable  Safety sitter at bedside  Pt still having complaint of tooth pain and requesting to see a dentist.   Pt given pain medication per eMAR  Fall precautions in place.   Pt ambulating frequently   Hourly rounding completed

## 2019-02-06 NOTE — CARE PLAN
Problem: Safety  Goal: Will remain free from injury  Outcome: PROGRESSING AS EXPECTED  Safety sitter at bedside. C-collar on at all times. Pt educated on fall precautions    Problem: Mobility  Goal: Risk for activity intolerance will decrease  Outcome: PROGRESSING AS EXPECTED  Pt's gait steady with SBA. No falls during shift. Pt educated regarding pain medication and fall prevention

## 2019-02-07 PROCEDURE — A9270 NON-COVERED ITEM OR SERVICE: HCPCS | Performed by: NURSE PRACTITIONER

## 2019-02-07 PROCEDURE — 700102 HCHG RX REV CODE 250 W/ 637 OVERRIDE(OP): Performed by: PSYCHIATRY & NEUROLOGY

## 2019-02-07 PROCEDURE — 99232 SBSQ HOSP IP/OBS MODERATE 35: CPT | Performed by: PSYCHIATRY & NEUROLOGY

## 2019-02-07 PROCEDURE — 700112 HCHG RX REV CODE 229: Performed by: NURSE PRACTITIONER

## 2019-02-07 PROCEDURE — 700102 HCHG RX REV CODE 250 W/ 637 OVERRIDE(OP): Performed by: NURSE PRACTITIONER

## 2019-02-07 PROCEDURE — 770001 HCHG ROOM/CARE - MED/SURG/GYN PRIV*

## 2019-02-07 PROCEDURE — 700111 HCHG RX REV CODE 636 W/ 250 OVERRIDE (IP): Performed by: SURGERY

## 2019-02-07 PROCEDURE — A9270 NON-COVERED ITEM OR SERVICE: HCPCS | Performed by: PSYCHIATRY & NEUROLOGY

## 2019-02-07 RX ADMIN — ENOXAPARIN SODIUM 30 MG: 100 INJECTION SUBCUTANEOUS at 17:30

## 2019-02-07 RX ADMIN — DOCUSATE SODIUM 100 MG: 100 CAPSULE, LIQUID FILLED ORAL at 05:54

## 2019-02-07 RX ADMIN — DOCUSATE SODIUM 100 MG: 100 CAPSULE, LIQUID FILLED ORAL at 17:30

## 2019-02-07 RX ADMIN — ENOXAPARIN SODIUM 30 MG: 100 INJECTION SUBCUTANEOUS at 05:54

## 2019-02-07 RX ADMIN — OXYCODONE AND ACETAMINOPHEN 1 TABLET: 5; 325 TABLET ORAL at 05:54

## 2019-02-07 RX ADMIN — GABAPENTIN 300 MG: 300 CAPSULE ORAL at 17:30

## 2019-02-07 RX ADMIN — TRAMADOL HYDROCHLORIDE 100 MG: 50 TABLET, COATED ORAL at 14:51

## 2019-02-07 RX ADMIN — OXYCODONE AND ACETAMINOPHEN 1 TABLET: 5; 325 TABLET ORAL at 12:27

## 2019-02-07 RX ADMIN — OLANZAPINE 15 MG: 5 TABLET, FILM COATED ORAL at 19:48

## 2019-02-07 RX ADMIN — CLONAZEPAM 0.5 MG: 0.5 TABLET ORAL at 17:30

## 2019-02-07 RX ADMIN — OXYCODONE AND ACETAMINOPHEN 1 TABLET: 5; 325 TABLET ORAL at 22:02

## 2019-02-07 RX ADMIN — CLONAZEPAM 0.5 MG: 0.5 TABLET ORAL at 22:02

## 2019-02-07 RX ADMIN — POLYETHYLENE GLYCOL 3350 1 PACKET: 17 POWDER, FOR SOLUTION ORAL at 05:55

## 2019-02-07 RX ADMIN — MAGNESIUM HYDROXIDE 30 ML: 400 SUSPENSION ORAL at 05:55

## 2019-02-07 RX ADMIN — CLONAZEPAM 0.5 MG: 0.5 TABLET ORAL at 10:48

## 2019-02-07 RX ADMIN — TRAMADOL HYDROCHLORIDE 100 MG: 50 TABLET, COATED ORAL at 20:29

## 2019-02-07 RX ADMIN — SENNOSIDES AND DOCUSATE SODIUM 1 TABLET: 8.6; 5 TABLET ORAL at 22:02

## 2019-02-07 RX ADMIN — TRAZODONE HYDROCHLORIDE 50 MG: 100 TABLET ORAL at 22:02

## 2019-02-07 RX ADMIN — GABAPENTIN 300 MG: 300 CAPSULE ORAL at 12:27

## 2019-02-07 RX ADMIN — GABAPENTIN 300 MG: 300 CAPSULE ORAL at 05:54

## 2019-02-07 RX ADMIN — TRAMADOL HYDROCHLORIDE 100 MG: 50 TABLET, COATED ORAL at 08:34

## 2019-02-07 RX ADMIN — NICOTINE 14 MG: 14 PATCH, EXTENDED RELEASE TRANSDERMAL at 05:55

## 2019-02-07 ASSESSMENT — ENCOUNTER SYMPTOMS
NAUSEA: 0
SHORTNESS OF BREATH: 0
ABDOMINAL PAIN: 0
NECK PAIN: 1
ROS GI COMMENTS: 2/5 BM

## 2019-02-07 NOTE — PROGRESS NOTES
2 RN skin check performed. Pt's skin under c-collar is pink, blanchable and intact. No problems noted.
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
## 2019-02-07 NOTE — PROGRESS NOTES
"Assumed care of pt at 0700.   Pt is A&Ox4.   Pt complains of pain \"4/10 to neck\", medication given per MAR.   1:1 sitter at bedside for safety.   C-collar in place at all times.   Plan of care discussed.   Hourly rounding in place.   "

## 2019-02-07 NOTE — PROGRESS NOTES
"Pt c/o nausea. Within 5 minutes having emesis. Administered Zofran per MD order. 15 minutes later, pt states nausea is completely gone, feels \"great\".   "

## 2019-02-07 NOTE — CARE PLAN
Problem: Safety  Goal: Will remain free from injury  Outcome: PROGRESSING AS EXPECTED  Treaded socks in place. 1:1 sitter at bedside. Bed locked and in lowest position. Call light within reach.     Problem: Mobility  Goal: Risk for activity intolerance will decrease  Outcome: PROGRESSING AS EXPECTED  Pt ambulating around room and unit with standby assist.

## 2019-02-07 NOTE — DISCHARGE PLANNING
Agency/Facility Name: [facility]  Spoke To: Stephanie  Outcome: Patient declined- no open beds.    Agency/Facility Name: [facility]  Spoke To: Admissions  Outcome: Admissions staff to locate referral and will call this CCA back.    Agency/Facility Name: [facility]  Spoke To: Polina  Outcome: Patient declined- no open beds.    Agency/Facility Name: [facility]  Spoke To: Abdiel(p:[phone])  Outcome: Referral resent to [phone] per Abdiel's request.    Agency/Facility Name: [facility]  Outcome: Attempted to follow up, however, no answer. Message left.

## 2019-02-07 NOTE — CARE PLAN
Problem: Safety  Goal: Will remain free from injury  Outcome: PROGRESSING AS EXPECTED  Patient has 1:1 sitter at bedside. Escorted off unit to chapel and out to patio once during shift with supervision. Patient able to make needs known.     Problem: Knowledge Deficit  Goal: Knowledge of disease process/condition, treatment plan, diagnostic tests, and medications will improve  Outcome: PROGRESSING AS EXPECTED  Educated pt on POC, activities, encouraging pt to ask questions, providing answers to pt questions, educating pt about medications, encouraging pt envolvement in care process, and continuing with current POC. Patient requires reinforcement with medication at times r/t forgetfulness and anxiety.    Problem: Pain Management  Goal: Pain level will decrease to patient's comfort goal  Outcome: PROGRESSING AS EXPECTED  Patient medicated per orders, see MAR, with positive results.

## 2019-02-07 NOTE — DISCHARGE PLANNING
"Anticipated Discharge Disposition: SNF    Action: LSW received an email from [facility] office stating:    \"Thank you for your email. We would need future information from the client to answer your questions. Please complete attached  ( Appointment of Representative form), have client sign and either fax ([phone]) or scan back to us. We would need to look at the client’s benefits to determine which programs they are enrolled in before we can answer your question. If you are unsure of the client’s case number, please feel free to have the client contact our hotline at [phone]. Thank you. FAX: [phone]\"       Barriers to Discharge: SNF Acceptance; Safe DC Plan; Unengaged Family; [employer] Insurance      Plan: LSW to f/u with PFA    "

## 2019-02-07 NOTE — PROGRESS NOTES
"Took report, assumed pt care at 1900. Pt alert and oriented x 4, pleasant and cooperative, but anxious and stating that he is in pain. Pt was informed that he received almost all of his pain medications at 1800, and replied that he wanted something to help him sleep. Educated on the importance of asking for help with ambulation due to meds on board, and pt responded by saying \"I feel fine, look\" and began trying to balance on one foot. This RN and 1:1 sitter helped pt back to bed, where he stated he would not do that anymore. Gave pt scheduled and remaining prn medications over the next 60 minutes, supplied with heat packs and repositioned. Interventions effective, pt sleeping, call light in reach.   "

## 2019-02-07 NOTE — CARE PLAN
Problem: Infection  Goal: Will remain free from infection  Outcome: PROGRESSING AS EXPECTED  Pt is afebrile and does not exhibit any s/s of infection. Educated on the importance of handwashing, kalee b/c he does ambulate around the unit frequently touching things as rails, desktops, etc.

## 2019-02-08 PROCEDURE — A9270 NON-COVERED ITEM OR SERVICE: HCPCS | Performed by: NURSE PRACTITIONER

## 2019-02-08 PROCEDURE — A9270 NON-COVERED ITEM OR SERVICE: HCPCS | Performed by: PSYCHIATRY & NEUROLOGY

## 2019-02-08 PROCEDURE — 700102 HCHG RX REV CODE 250 W/ 637 OVERRIDE(OP): Performed by: NURSE PRACTITIONER

## 2019-02-08 PROCEDURE — 700102 HCHG RX REV CODE 250 W/ 637 OVERRIDE(OP): Performed by: PSYCHIATRY & NEUROLOGY

## 2019-02-08 PROCEDURE — 770001 HCHG ROOM/CARE - MED/SURG/GYN PRIV*

## 2019-02-08 PROCEDURE — 700111 HCHG RX REV CODE 636 W/ 250 OVERRIDE (IP): Performed by: SURGERY

## 2019-02-08 PROCEDURE — 700112 HCHG RX REV CODE 229: Performed by: NURSE PRACTITIONER

## 2019-02-08 RX ADMIN — ENOXAPARIN SODIUM 30 MG: 100 INJECTION SUBCUTANEOUS at 15:39

## 2019-02-08 RX ADMIN — ENOXAPARIN SODIUM 30 MG: 100 INJECTION SUBCUTANEOUS at 05:56

## 2019-02-08 RX ADMIN — DOCUSATE SODIUM 100 MG: 100 CAPSULE, LIQUID FILLED ORAL at 05:55

## 2019-02-08 RX ADMIN — TRAZODONE HYDROCHLORIDE 50 MG: 100 TABLET ORAL at 21:10

## 2019-02-08 RX ADMIN — NICOTINE 14 MG: 14 PATCH, EXTENDED RELEASE TRANSDERMAL at 05:54

## 2019-02-08 RX ADMIN — CLONAZEPAM 0.5 MG: 0.5 TABLET ORAL at 10:10

## 2019-02-08 RX ADMIN — OXYCODONE AND ACETAMINOPHEN 1 TABLET: 5; 325 TABLET ORAL at 12:52

## 2019-02-08 RX ADMIN — OLANZAPINE 5 MG: 5 TABLET, FILM COATED ORAL at 22:41

## 2019-02-08 RX ADMIN — ACETAMINOPHEN 650 MG: 325 TABLET, FILM COATED ORAL at 18:05

## 2019-02-08 RX ADMIN — CLONAZEPAM 0.5 MG: 0.5 TABLET ORAL at 15:40

## 2019-02-08 RX ADMIN — OXYCODONE AND ACETAMINOPHEN 1 TABLET: 5; 325 TABLET ORAL at 21:13

## 2019-02-08 RX ADMIN — GABAPENTIN 300 MG: 300 CAPSULE ORAL at 15:40

## 2019-02-08 RX ADMIN — SENNOSIDES AND DOCUSATE SODIUM 1 TABLET: 8.6; 5 TABLET ORAL at 21:10

## 2019-02-08 RX ADMIN — DOCUSATE SODIUM 100 MG: 100 CAPSULE, LIQUID FILLED ORAL at 15:40

## 2019-02-08 RX ADMIN — CLONAZEPAM 0.5 MG: 0.5 TABLET ORAL at 21:11

## 2019-02-08 RX ADMIN — GABAPENTIN 300 MG: 300 CAPSULE ORAL at 12:52

## 2019-02-08 RX ADMIN — GABAPENTIN 300 MG: 300 CAPSULE ORAL at 05:54

## 2019-02-08 RX ADMIN — TRAMADOL HYDROCHLORIDE 100 MG: 50 TABLET, COATED ORAL at 15:39

## 2019-02-08 RX ADMIN — IBUPROFEN 600 MG: 600 TABLET ORAL at 18:04

## 2019-02-08 RX ADMIN — TRAMADOL HYDROCHLORIDE 100 MG: 50 TABLET, COATED ORAL at 08:24

## 2019-02-08 RX ADMIN — POLYETHYLENE GLYCOL 3350 1 PACKET: 17 POWDER, FOR SOLUTION ORAL at 05:54

## 2019-02-08 RX ADMIN — OLANZAPINE 15 MG: 5 TABLET, FILM COATED ORAL at 15:41

## 2019-02-08 RX ADMIN — ACETAMINOPHEN 650 MG: 325 TABLET, FILM COATED ORAL at 08:24

## 2019-02-08 ASSESSMENT — ENCOUNTER SYMPTOMS
ABDOMINAL PAIN: 0
NECK PAIN: 1
ROS GI COMMENTS: 2/7 BM
SHORTNESS OF BREATH: 0
NAUSEA: 0
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
## 2019-02-08 NOTE — PROGRESS NOTES
"Pt was laying down in bed with his c-collar on and complained of a \"pop\" in c-spine area. Pt complaining of pain and numbness in his hands. Paged the trauma NP on-call to let them know and to get further orders, phone went to voicemail and message left for NP. Will update once this RN hears back from the provider.      Addendum: Received call back from NP, appraised of the current situation. Pt's pain has become intermittent, as has the tingling he is feeling in his right arm. Will continue to monitor and inform the trauma NP on-call should any other developments arise.  "

## 2019-02-08 NOTE — CARE PLAN
"Problem: Safety  Goal: Will remain free from injury  Outcome: PROGRESSING AS EXPECTED  Pt is currently with a 1:1 sitter (for a medical hold). Staff will continue to round hourly on the pt to address his personal and toileting needs. Will continue to monitor and assess.    Problem: Knowledge Deficit  Goal: Knowledge of disease process/condition, treatment plan, diagnostic tests, and medications will improve  Outcome: PROGRESSING AS EXPECTED  Pt has had frequent questions about his current plan of care (specifically how it relates to his discharge planning). Tried to reinforce with the patient that psychiatry and trauma need to discuss his care and will keep him informed of the current care plan. Will continue to reinforce this as needed with the patient.    Problem: Pain Management  Goal: Pain level will decrease to patient's comfort goal  Outcome: PROGRESSING AS EXPECTED  Pt continues to complain of neck pain. This evening he experienced two \"pops\" in his neck while in his patient room. These were accompanied by an increase in his posterior neck pain. PRN pain medication given as ordered. Will continue to monitor and assess.      "

## 2019-02-08 NOTE — PROGRESS NOTES
" Pt c/o neck \"popping\" again and subsequent tingling and numbness in his right arm again. Neuro check performed on the spot did not reveal any difference in  strength from right to left, and the pt was able to tell when I was touching his right arm. Pt also complained of increase in pain in his cervical spine area after the pop. The pt had recently been medicated with PRN pain medication. Consulted with the charge RN and it was decided to continue to monitor the patient for any changes at this time.  "

## 2019-02-09 PROCEDURE — 700112 HCHG RX REV CODE 229: Performed by: NURSE PRACTITIONER

## 2019-02-09 PROCEDURE — 700111 HCHG RX REV CODE 636 W/ 250 OVERRIDE (IP): Performed by: SURGERY

## 2019-02-09 PROCEDURE — 700102 HCHG RX REV CODE 250 W/ 637 OVERRIDE(OP): Performed by: PSYCHIATRY & NEUROLOGY

## 2019-02-09 PROCEDURE — 700102 HCHG RX REV CODE 250 W/ 637 OVERRIDE(OP): Performed by: NURSE PRACTITIONER

## 2019-02-09 PROCEDURE — A9270 NON-COVERED ITEM OR SERVICE: HCPCS | Performed by: PSYCHIATRY & NEUROLOGY

## 2019-02-09 PROCEDURE — 770001 HCHG ROOM/CARE - MED/SURG/GYN PRIV*

## 2019-02-09 PROCEDURE — A9270 NON-COVERED ITEM OR SERVICE: HCPCS | Performed by: NURSE PRACTITIONER

## 2019-02-09 RX ADMIN — CLONAZEPAM 0.5 MG: 0.5 TABLET ORAL at 15:47

## 2019-02-09 RX ADMIN — GABAPENTIN 300 MG: 300 CAPSULE ORAL at 04:59

## 2019-02-09 RX ADMIN — OXYCODONE AND ACETAMINOPHEN 1 TABLET: 5; 325 TABLET ORAL at 17:54

## 2019-02-09 RX ADMIN — ACETAMINOPHEN 650 MG: 325 TABLET, FILM COATED ORAL at 04:59

## 2019-02-09 RX ADMIN — TRAMADOL HYDROCHLORIDE 100 MG: 50 TABLET, COATED ORAL at 15:47

## 2019-02-09 RX ADMIN — ENOXAPARIN SODIUM 30 MG: 100 INJECTION SUBCUTANEOUS at 18:07

## 2019-02-09 RX ADMIN — IBUPROFEN 600 MG: 600 TABLET ORAL at 17:54

## 2019-02-09 RX ADMIN — ENOXAPARIN SODIUM 30 MG: 100 INJECTION SUBCUTANEOUS at 04:59

## 2019-02-09 RX ADMIN — GABAPENTIN 300 MG: 300 CAPSULE ORAL at 11:51

## 2019-02-09 RX ADMIN — CLONAZEPAM 0.5 MG: 0.5 TABLET ORAL at 10:13

## 2019-02-09 RX ADMIN — GABAPENTIN 300 MG: 300 CAPSULE ORAL at 17:54

## 2019-02-09 RX ADMIN — NICOTINE 14 MG: 14 PATCH, EXTENDED RELEASE TRANSDERMAL at 04:59

## 2019-02-09 RX ADMIN — TRAZODONE HYDROCHLORIDE 50 MG: 100 TABLET ORAL at 20:54

## 2019-02-09 RX ADMIN — ACETAMINOPHEN 650 MG: 325 TABLET, FILM COATED ORAL at 11:51

## 2019-02-09 RX ADMIN — OXYCODONE AND ACETAMINOPHEN 1 TABLET: 5; 325 TABLET ORAL at 10:13

## 2019-02-09 RX ADMIN — TRAMADOL HYDROCHLORIDE 100 MG: 50 TABLET, COATED ORAL at 07:54

## 2019-02-09 RX ADMIN — IBUPROFEN 600 MG: 600 TABLET ORAL at 11:51

## 2019-02-09 RX ADMIN — ACETAMINOPHEN 650 MG: 325 TABLET, FILM COATED ORAL at 22:19

## 2019-02-09 RX ADMIN — OLANZAPINE 15 MG: 5 TABLET, FILM COATED ORAL at 17:54

## 2019-02-09 RX ADMIN — DOCUSATE SODIUM 100 MG: 100 CAPSULE, LIQUID FILLED ORAL at 04:59

## 2019-02-09 RX ADMIN — OLANZAPINE 5 MG: 5 TABLET, FILM COATED ORAL at 20:54

## 2019-02-09 RX ADMIN — OXYCODONE AND ACETAMINOPHEN 1 TABLET: 5; 325 TABLET ORAL at 04:59

## 2019-02-09 RX ADMIN — OXYCODONE AND ACETAMINOPHEN 1 TABLET: 5; 325 TABLET ORAL at 23:43

## 2019-02-09 RX ADMIN — CLONAZEPAM 0.5 MG: 0.5 TABLET ORAL at 20:54

## 2019-02-09 RX ADMIN — TRAMADOL HYDROCHLORIDE 50 MG: 50 TABLET, COATED ORAL at 23:44

## 2019-02-09 ASSESSMENT — ENCOUNTER SYMPTOMS
NECK PAIN: 1
NAUSEA: 0
ABDOMINAL PAIN: 0
SHORTNESS OF BREATH: 0
ROS GI COMMENTS: 2/8 BM

## 2019-02-09 NOTE — CARE PLAN
Problem: Knowledge Deficit  Goal: Knowledge of disease process/condition, treatment plan, diagnostic tests, and medications will improve  Outcome: PROGRESSING AS EXPECTED  Education improving with pt

## 2019-02-09 NOTE — CARE PLAN
Problem: Safety  Goal: Will remain free from injury  Outcome: PROGRESSING AS EXPECTED  1:1 sitter at bedside, treaded socks on    Problem: Pain Management  Goal: Pain level will decrease to patient's comfort goal  Outcome: PROGRESSING SLOWER THAN EXPECTED  Complains of pain often

## 2019-02-09 NOTE — PROGRESS NOTES
"No serious events today      - VSS  - Pt thoroughly educated on expectations while at hospital      - Sexually inappropriate comments to women, these are improving & pt understands how his comments can be taken the wrong way   Pt states \"I love woman, I like to tell them how pretty they are\"      - Pt c/o pain improving, needing less medication today      Plan: Awaiting Speech therapy pending cog eval / capacity  ** Order placed by APRN, awaiting Eval **   "

## 2019-02-09 NOTE — PROGRESS NOTES
"No serious events today     - VSS  - Pt thoroughly educated on expectations while at hospital     - Pt spoken to about sexually inappropriate comments to women, these are improving & pt understands how his comments can be taken the wrong way     - Pt frequently asks for oral medications for coping with anxiety   --- Pt educated on coping mechanisms and states \"I'm going to try what you suggest, thanks man!\"     Plan: Awaiting Speech therapy pending cog eval / capacity   "

## 2019-02-10 PROCEDURE — A9270 NON-COVERED ITEM OR SERVICE: HCPCS | Performed by: NURSE PRACTITIONER

## 2019-02-10 PROCEDURE — 700102 HCHG RX REV CODE 250 W/ 637 OVERRIDE(OP): Performed by: NURSE PRACTITIONER

## 2019-02-10 PROCEDURE — A9270 NON-COVERED ITEM OR SERVICE: HCPCS | Performed by: PSYCHIATRY & NEUROLOGY

## 2019-02-10 PROCEDURE — 700111 HCHG RX REV CODE 636 W/ 250 OVERRIDE (IP): Performed by: SURGERY

## 2019-02-10 PROCEDURE — 700112 HCHG RX REV CODE 229: Performed by: NURSE PRACTITIONER

## 2019-02-10 PROCEDURE — 770001 HCHG ROOM/CARE - MED/SURG/GYN PRIV*

## 2019-02-10 PROCEDURE — 700102 HCHG RX REV CODE 250 W/ 637 OVERRIDE(OP): Performed by: PSYCHIATRY & NEUROLOGY

## 2019-02-10 PROCEDURE — 700111 HCHG RX REV CODE 636 W/ 250 OVERRIDE (IP): Performed by: PSYCHIATRY & NEUROLOGY

## 2019-02-10 RX ADMIN — OXYCODONE AND ACETAMINOPHEN 1 TABLET: 5; 325 TABLET ORAL at 13:29

## 2019-02-10 RX ADMIN — ACETAMINOPHEN 650 MG: 325 TABLET, FILM COATED ORAL at 15:17

## 2019-02-10 RX ADMIN — ACETAMINOPHEN 650 MG: 325 TABLET, FILM COATED ORAL at 09:00

## 2019-02-10 RX ADMIN — ENOXAPARIN SODIUM 30 MG: 100 INJECTION SUBCUTANEOUS at 06:21

## 2019-02-10 RX ADMIN — ZIPRASIDONE MESYLATE 10 MG: 20 INJECTION, POWDER, LYOPHILIZED, FOR SOLUTION INTRAMUSCULAR at 15:30

## 2019-02-10 RX ADMIN — ACETAMINOPHEN 325 MG: 325 TABLET, FILM COATED ORAL at 21:18

## 2019-02-10 RX ADMIN — CLONAZEPAM 0.5 MG: 0.5 TABLET ORAL at 09:00

## 2019-02-10 RX ADMIN — GABAPENTIN 300 MG: 300 CAPSULE ORAL at 06:21

## 2019-02-10 RX ADMIN — DOCUSATE SODIUM 100 MG: 100 CAPSULE, LIQUID FILLED ORAL at 17:11

## 2019-02-10 RX ADMIN — TRAMADOL HYDROCHLORIDE 100 MG: 50 TABLET, COATED ORAL at 21:18

## 2019-02-10 RX ADMIN — OLANZAPINE 5 MG: 5 TABLET, FILM COATED ORAL at 19:56

## 2019-02-10 RX ADMIN — NICOTINE 14 MG: 14 PATCH, EXTENDED RELEASE TRANSDERMAL at 06:20

## 2019-02-10 RX ADMIN — TRAZODONE HYDROCHLORIDE 50 MG: 100 TABLET ORAL at 19:56

## 2019-02-10 RX ADMIN — CLONAZEPAM 0.5 MG: 0.5 TABLET ORAL at 15:17

## 2019-02-10 RX ADMIN — GABAPENTIN 300 MG: 300 CAPSULE ORAL at 13:29

## 2019-02-10 RX ADMIN — OLANZAPINE 15 MG: 5 TABLET, FILM COATED ORAL at 17:11

## 2019-02-10 RX ADMIN — OXYCODONE AND ACETAMINOPHEN 1 TABLET: 5; 325 TABLET ORAL at 06:21

## 2019-02-10 RX ADMIN — TRAMADOL HYDROCHLORIDE 100 MG: 50 TABLET, COATED ORAL at 09:00

## 2019-02-10 RX ADMIN — ZIPRASIDONE MESYLATE 10 MG: 20 INJECTION, POWDER, LYOPHILIZED, FOR SOLUTION INTRAMUSCULAR at 10:16

## 2019-02-10 RX ADMIN — OXYCODONE AND ACETAMINOPHEN 1 TABLET: 5; 325 TABLET ORAL at 19:21

## 2019-02-10 RX ADMIN — TRAMADOL HYDROCHLORIDE 100 MG: 50 TABLET, COATED ORAL at 15:17

## 2019-02-10 RX ADMIN — GABAPENTIN 300 MG: 300 CAPSULE ORAL at 17:11

## 2019-02-10 RX ADMIN — CLONAZEPAM 0.5 MG: 0.5 TABLET ORAL at 21:18

## 2019-02-10 RX ADMIN — IBUPROFEN 600 MG: 600 TABLET ORAL at 17:11

## 2019-02-10 RX ADMIN — DOCUSATE SODIUM 100 MG: 100 CAPSULE, LIQUID FILLED ORAL at 06:21

## 2019-02-10 ASSESSMENT — ENCOUNTER SYMPTOMS
ROS GI COMMENTS: 2/8 BM
NECK PAIN: 1
SHORTNESS OF BREATH: 0
NAUSEA: 0

## 2019-02-10 ASSESSMENT — PATIENT HEALTH QUESTIONNAIRE - PHQ9
SUM OF ALL RESPONSES TO PHQ9 QUESTIONS 1 AND 2: 0
2. FEELING DOWN, DEPRESSED, IRRITABLE, OR HOPELESS: NOT AT ALL
1. LITTLE INTEREST OR PLEASURE IN DOING THINGS: NOT AT ALL

## 2019-02-10 NOTE — PROGRESS NOTES
"Psych paged concerning pts agitation   - Pt continuing to be non compliant with instruction stating:   \"I'm getting the hell out of here today and you cant stop it\"     @ 1525: Pt becoming increasingly agitated, yelling at RN   - Attempts to deescalate not successful   Geodon given per MAR     No return call from psych at this time, recommendation appreciated   "

## 2019-02-10 NOTE — PROGRESS NOTES
"Pt becoming more difficult today     Very anxious, abusing dietary with excessive deserts (x6 milkshakes ordered & x4 cakes)    Pt bothering staff members after much education, pt is not accepting education at this time     Pt stated to supervisor \"I hate my nurse, he's mean\"   - Attempts to deescalate unsuccessful   - Pt upset \"I'm not getting my way\"   - Pt states \"I just want to be high a kite\"   Pt states \"You know, I've been high since 1986 - being high is awesome\"     Staff educated on pt behaviors   "

## 2019-02-10 NOTE — CARE PLAN
Problem: Safety  Goal: Will remain free from injury  Outcome: PROGRESSING SLOWER THAN EXPECTED  1:1 sitter, treaded socks on, hospital clothing on    Problem: Mobility  Goal: Risk for activity intolerance will decrease  Outcome: PROGRESSING AS EXPECTED  Ambulates often
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
## 2019-02-10 NOTE — PROGRESS NOTES
Pt is anxious but was happy to receive a shower with assistance from staff. 1:1 sitter at the bedside. Ambulates with no assistance needed.

## 2019-02-10 NOTE — PROGRESS NOTES
"Pt came out to hallway with 1:1 sitter to get RN    - Pt very very anxious   - Pt bit his tongue due to high anxiety  * Tongue is intact, small cut / laceration with scant blood present   -- > Gauze applied, pt educated     Pt states: \"I'm so anxious I want to hit someone\"    Rn talked pt down with breathing exercises & focusing on Religious  - Bible given to pt   - Cross necklace given to pt for comfort   - Geodon IM given due to high anxiety / agitation     Pt states \"I don't want to hurt anyone, I really need that shot\"     V/S remained stable, -110's at this time decreasing after speaking with RN for 30+min     Interventions:   - stimulus decreased, lights turned off, curtain pulled for privacy with 1:1 sitter in view   - Geodon given   - Coping strategies gone over thoroughly with pt   - Restoration music turned on  - 1:1 sitter thoroughly educated on when to call RN & when to stop pt from entering galindo (example: Bloody mouth)    Pt doing better at this time, states:   \"Oh I feel 120% better now, thank you so much. That was the worst anxiety daysi had in a while\"    Anxiety appears provoked by outside situations involving family & leaving for [place] with Son?    Awaiting speech / cog eval       "

## 2019-02-11 PROCEDURE — 770001 HCHG ROOM/CARE - MED/SURG/GYN PRIV*

## 2019-02-11 PROCEDURE — 700111 HCHG RX REV CODE 636 W/ 250 OVERRIDE (IP): Performed by: SURGERY

## 2019-02-11 PROCEDURE — 700102 HCHG RX REV CODE 250 W/ 637 OVERRIDE(OP): Performed by: NURSE PRACTITIONER

## 2019-02-11 PROCEDURE — 700112 HCHG RX REV CODE 229: Performed by: NURSE PRACTITIONER

## 2019-02-11 PROCEDURE — A9270 NON-COVERED ITEM OR SERVICE: HCPCS | Performed by: NURSE PRACTITIONER

## 2019-02-11 PROCEDURE — A9270 NON-COVERED ITEM OR SERVICE: HCPCS | Performed by: PSYCHIATRY & NEUROLOGY

## 2019-02-11 PROCEDURE — 700102 HCHG RX REV CODE 250 W/ 637 OVERRIDE(OP): Performed by: PSYCHIATRY & NEUROLOGY

## 2019-02-11 PROCEDURE — G0515 COGNITIVE SKILLS DEVELOPMENT: HCPCS

## 2019-02-11 PROCEDURE — 700111 HCHG RX REV CODE 636 W/ 250 OVERRIDE (IP): Performed by: PSYCHIATRY & NEUROLOGY

## 2019-02-11 RX ADMIN — ZIPRASIDONE MESYLATE 10 MG: 20 INJECTION, POWDER, LYOPHILIZED, FOR SOLUTION INTRAMUSCULAR at 15:22

## 2019-02-11 RX ADMIN — TRAMADOL HYDROCHLORIDE 100 MG: 50 TABLET, COATED ORAL at 18:00

## 2019-02-11 RX ADMIN — ZIPRASIDONE MESYLATE 10 MG: 20 INJECTION, POWDER, LYOPHILIZED, FOR SOLUTION INTRAMUSCULAR at 22:45

## 2019-02-11 RX ADMIN — GABAPENTIN 300 MG: 300 CAPSULE ORAL at 18:01

## 2019-02-11 RX ADMIN — ENOXAPARIN SODIUM 30 MG: 100 INJECTION SUBCUTANEOUS at 04:52

## 2019-02-11 RX ADMIN — ACETAMINOPHEN 650 MG: 325 TABLET, FILM COATED ORAL at 20:04

## 2019-02-11 RX ADMIN — GABAPENTIN 300 MG: 300 CAPSULE ORAL at 12:48

## 2019-02-11 RX ADMIN — GABAPENTIN 300 MG: 300 CAPSULE ORAL at 04:52

## 2019-02-11 RX ADMIN — CLONAZEPAM 0.5 MG: 0.5 TABLET ORAL at 18:01

## 2019-02-11 RX ADMIN — OLANZAPINE 5 MG: 5 TABLET, FILM COATED ORAL at 20:41

## 2019-02-11 RX ADMIN — OXYCODONE AND ACETAMINOPHEN 1 TABLET: 5; 325 TABLET ORAL at 20:04

## 2019-02-11 RX ADMIN — TRAZODONE HYDROCHLORIDE 50 MG: 100 TABLET ORAL at 20:04

## 2019-02-11 RX ADMIN — SENNOSIDES AND DOCUSATE SODIUM 1 TABLET: 8.6; 5 TABLET ORAL at 20:41

## 2019-02-11 RX ADMIN — CLONAZEPAM 0.5 MG: 0.5 TABLET ORAL at 08:57

## 2019-02-11 RX ADMIN — OXYCODONE AND ACETAMINOPHEN 1 TABLET: 5; 325 TABLET ORAL at 04:52

## 2019-02-11 RX ADMIN — ACETAMINOPHEN 650 MG: 325 TABLET, FILM COATED ORAL at 05:24

## 2019-02-11 RX ADMIN — DOCUSATE SODIUM 100 MG: 100 CAPSULE, LIQUID FILLED ORAL at 04:52

## 2019-02-11 RX ADMIN — ENOXAPARIN SODIUM 30 MG: 100 INJECTION SUBCUTANEOUS at 17:59

## 2019-02-11 RX ADMIN — ZIPRASIDONE MESYLATE 10 MG: 20 INJECTION, POWDER, LYOPHILIZED, FOR SOLUTION INTRAMUSCULAR at 10:06

## 2019-02-11 RX ADMIN — CLONAZEPAM 0.5 MG: 0.5 TABLET ORAL at 21:28

## 2019-02-11 RX ADMIN — OLANZAPINE 15 MG: 5 TABLET, FILM COATED ORAL at 18:00

## 2019-02-11 RX ADMIN — OXYCODONE AND ACETAMINOPHEN 1 TABLET: 5; 325 TABLET ORAL at 12:48

## 2019-02-11 RX ADMIN — TRAMADOL HYDROCHLORIDE 50 MG: 50 TABLET, COATED ORAL at 05:24

## 2019-02-11 RX ADMIN — NICOTINE 14 MG: 14 PATCH, EXTENDED RELEASE TRANSDERMAL at 04:52

## 2019-02-11 ASSESSMENT — ENCOUNTER SYMPTOMS
SHORTNESS OF BREATH: 0
ROS GI COMMENTS: 2/8 BM
NAUSEA: 0
NECK PAIN: 1
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
## 2019-02-11 NOTE — PROGRESS NOTES
While primary RN was at lunch, the pt left the floor with the 1:1 sitter against RN instruction     - Sitter given report in AM about expectations & history with pt   - Sitter allowed this behavior to occur and did not call primary RN to notify pt leaving   ----------------------------------------------------------------------------------------  - Pt found to be calling 911, Security up to unit to investigate -- > Pt was not in room. Primary RN was then called and notified of this situation     ** Pt went to vending machines near cafeteria to buy a red bull against nursing instruction **    **The pt was later found near the [facility] by another neuroscience RN with sitter present **    - The 1:1 sitter accompanied the pt and notified BRAULIO Bell @ back nurses [facility] however primary RN or security was NOT notified      Supervisor notified & involved in this situation   Extensive education provided to pt, 1:1 sitter, and neuroscience staff.    Education not successful with pt

## 2019-02-11 NOTE — PROGRESS NOTES
Pt is very anxious and educated on calming techniques, pt ignores the strategies. Offered comfort measures and snacks.     Able to reassure pt and deescalate anxiety. Medications given.      1:1 sitter at the bedside, educated pt on where pt can walk and boundaries. Reinforcement needed.

## 2019-02-11 NOTE — PROGRESS NOTES
Patient states that he needs to call his  today at 1000.  Patient is currently walking the unit.  RN explained to the patient why we are unable to leave a phone in his room at this time, but that he can use the RN phone to call his  if needed.

## 2019-02-11 NOTE — DISCHARGE PLANNING
Anticipated Discharge Disposition:   [place] to live with sonBrendan    Action:    RN MARTHA spoke with sonBrendan [phone].  He stated he had been here to see his father recently.  He would like to have patient fly back with him to [place] and live with him.  He would be able to provide the 24/7 supervision.  Son not aware of patient's mental health history.  Son asked if father needed rehabilitation and informed him that SNF referral was placed but difficulty finding placement.  Provided information to son that he would need to get patient set up with insurance there, a PCP, and then get SNF rehab care.  Son stated he will start coordinating on his end the steps to get his father to [place].     Barriers to Discharge:    Placement    Plan:    Follow up with sonBrendan

## 2019-02-11 NOTE — CARE PLAN
Problem: Safety  Goal: Will remain free from injury  Outcome: PROGRESSING AS EXPECTED  1:1 sitter at the bedside, hospital gown on, treaded socks on    Problem: Psychosocial Needs:  Goal: Level of anxiety will decrease  Outcome: PROGRESSING SLOWER THAN EXPECTED  Educating pt on discharge plan, becomes very anxious

## 2019-02-11 NOTE — PROGRESS NOTES
Sitter notified RN that pt was planning to try to elope during the change of shift. RN then placed Wanderguard on L ankle.
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
## 2019-02-12 LAB
BASOPHILS # BLD AUTO: 0.6 % (ref 0–1.8)
BASOPHILS # BLD: 0.06 K/UL (ref 0–0.12)
EOSINOPHIL # BLD AUTO: 0.32 K/UL (ref 0–0.51)
EOSINOPHIL NFR BLD: 3.4 % (ref 0–6.9)
ERYTHROCYTE [DISTWIDTH] IN BLOOD BY AUTOMATED COUNT: 54.3 FL (ref 35.9–50)
HCT VFR BLD AUTO: 37.3 % (ref 42–52)
HGB BLD-MCNC: 11.8 G/DL (ref 14–18)
IMM GRANULOCYTES # BLD AUTO: 0.23 K/UL (ref 0–0.11)
IMM GRANULOCYTES NFR BLD AUTO: 2.4 % (ref 0–0.9)
LYMPHOCYTES # BLD AUTO: 3.18 K/UL (ref 1–4.8)
LYMPHOCYTES NFR BLD: 33.4 % (ref 22–41)
MCH RBC QN AUTO: 30.2 PG (ref 27–33)
MCHC RBC AUTO-ENTMCNC: 31.6 G/DL (ref 33.7–35.3)
MCV RBC AUTO: 95.4 FL (ref 81.4–97.8)
MONOCYTES # BLD AUTO: 0.82 K/UL (ref 0–0.85)
MONOCYTES NFR BLD AUTO: 8.6 % (ref 0–13.4)
NEUTROPHILS # BLD AUTO: 4.9 K/UL (ref 1.82–7.42)
NEUTROPHILS NFR BLD: 51.6 % (ref 44–72)
NRBC # BLD AUTO: 0 K/UL
NRBC BLD-RTO: 0 /100 WBC
PLATELET # BLD AUTO: 338 K/UL (ref 164–446)
PMV BLD AUTO: 8.9 FL (ref 9–12.9)
RBC # BLD AUTO: 3.91 M/UL (ref 4.7–6.1)
WBC # BLD AUTO: 9.5 K/UL (ref 4.8–10.8)

## 2019-02-12 PROCEDURE — 700102 HCHG RX REV CODE 250 W/ 637 OVERRIDE(OP): Performed by: NURSE PRACTITIONER

## 2019-02-12 PROCEDURE — 700111 HCHG RX REV CODE 636 W/ 250 OVERRIDE (IP): Performed by: SURGERY

## 2019-02-12 PROCEDURE — 36415 COLL VENOUS BLD VENIPUNCTURE: CPT

## 2019-02-12 PROCEDURE — A9270 NON-COVERED ITEM OR SERVICE: HCPCS | Performed by: NURSE PRACTITIONER

## 2019-02-12 PROCEDURE — 85025 COMPLETE CBC W/AUTO DIFF WBC: CPT

## 2019-02-12 PROCEDURE — A9270 NON-COVERED ITEM OR SERVICE: HCPCS | Performed by: PSYCHIATRY & NEUROLOGY

## 2019-02-12 PROCEDURE — 700112 HCHG RX REV CODE 229: Performed by: NURSE PRACTITIONER

## 2019-02-12 PROCEDURE — 700111 HCHG RX REV CODE 636 W/ 250 OVERRIDE (IP): Performed by: PSYCHIATRY & NEUROLOGY

## 2019-02-12 PROCEDURE — 770001 HCHG ROOM/CARE - MED/SURG/GYN PRIV*

## 2019-02-12 PROCEDURE — 700102 HCHG RX REV CODE 250 W/ 637 OVERRIDE(OP): Performed by: PSYCHIATRY & NEUROLOGY

## 2019-02-12 RX ADMIN — TRAMADOL HYDROCHLORIDE 100 MG: 50 TABLET, COATED ORAL at 06:18

## 2019-02-12 RX ADMIN — GABAPENTIN 300 MG: 300 CAPSULE ORAL at 16:55

## 2019-02-12 RX ADMIN — TRAZODONE HYDROCHLORIDE 50 MG: 100 TABLET ORAL at 21:00

## 2019-02-12 RX ADMIN — ZIPRASIDONE MESYLATE 10 MG: 20 INJECTION, POWDER, LYOPHILIZED, FOR SOLUTION INTRAMUSCULAR at 06:44

## 2019-02-12 RX ADMIN — OLANZAPINE 5 MG: 5 TABLET, FILM COATED ORAL at 19:41

## 2019-02-12 RX ADMIN — ZIPRASIDONE MESYLATE 10 MG: 20 INJECTION, POWDER, LYOPHILIZED, FOR SOLUTION INTRAMUSCULAR at 13:01

## 2019-02-12 RX ADMIN — OXYCODONE AND ACETAMINOPHEN 1 TABLET: 5; 325 TABLET ORAL at 06:18

## 2019-02-12 RX ADMIN — CLONAZEPAM 0.5 MG: 0.5 TABLET ORAL at 20:56

## 2019-02-12 RX ADMIN — OXYCODONE AND ACETAMINOPHEN 1 TABLET: 5; 325 TABLET ORAL at 13:58

## 2019-02-12 RX ADMIN — OLANZAPINE 15 MG: 5 TABLET, FILM COATED ORAL at 16:55

## 2019-02-12 RX ADMIN — CLONAZEPAM 0.5 MG: 0.5 TABLET ORAL at 10:52

## 2019-02-12 RX ADMIN — TRAMADOL HYDROCHLORIDE 100 MG: 50 TABLET, COATED ORAL at 19:41

## 2019-02-12 RX ADMIN — GABAPENTIN 300 MG: 300 CAPSULE ORAL at 10:52

## 2019-02-12 RX ADMIN — OXYCODONE AND ACETAMINOPHEN 1 TABLET: 5; 325 TABLET ORAL at 19:41

## 2019-02-12 RX ADMIN — CLONAZEPAM 0.5 MG: 0.5 TABLET ORAL at 16:55

## 2019-02-12 RX ADMIN — ENOXAPARIN SODIUM 30 MG: 100 INJECTION SUBCUTANEOUS at 16:55

## 2019-02-12 RX ADMIN — DOCUSATE SODIUM 100 MG: 100 CAPSULE, LIQUID FILLED ORAL at 16:55

## 2019-02-12 ASSESSMENT — ENCOUNTER SYMPTOMS
SHORTNESS OF BREATH: 0
NAUSEA: 0
NECK PAIN: 1
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
## 2019-02-12 NOTE — CARE PLAN
Problem: Safety  Goal: Will remain free from injury  Outcome: PROGRESSING AS EXPECTED  1:1 sitter at the bedside, hospital gown on, treaded socks on, continued education on staying within the unit     Problem: Psychosocial Needs:  Goal: Level of anxiety will decrease  Outcome: PROGRESSING SLOWER THAN EXPECTED  Educating pt on discharge plan, becomes very anxious, been very anxious

## 2019-02-12 NOTE — PROGRESS NOTES
"Educated pt and sitter about expectations to stay on the floor, use dietary resources properly, and staff treatment. Reinforcement needed for pt.     Pt states,\" I have to be good so I can get my settlement.\"    1:1 sitter at the bedside, comfort measures offered.   "

## 2019-02-13 VITALS
WEIGHT: 156.97 LBS | BODY MASS INDEX: 23.79 KG/M2 | SYSTOLIC BLOOD PRESSURE: 133 MMHG | DIASTOLIC BLOOD PRESSURE: 85 MMHG | OXYGEN SATURATION: 94 % | HEART RATE: 93 BPM | HEIGHT: 68 IN | TEMPERATURE: 97.8 F | RESPIRATION RATE: 20 BRPM

## 2019-02-13 PROCEDURE — 700102 HCHG RX REV CODE 250 W/ 637 OVERRIDE(OP): Performed by: NURSE PRACTITIONER

## 2019-02-13 PROCEDURE — A9270 NON-COVERED ITEM OR SERVICE: HCPCS | Performed by: PSYCHIATRY & NEUROLOGY

## 2019-02-13 PROCEDURE — G0515 COGNITIVE SKILLS DEVELOPMENT: HCPCS

## 2019-02-13 PROCEDURE — A9270 NON-COVERED ITEM OR SERVICE: HCPCS | Performed by: NURSE PRACTITIONER

## 2019-02-13 PROCEDURE — 700111 HCHG RX REV CODE 636 W/ 250 OVERRIDE (IP): Performed by: SURGERY

## 2019-02-13 PROCEDURE — 700102 HCHG RX REV CODE 250 W/ 637 OVERRIDE(OP): Performed by: PSYCHIATRY & NEUROLOGY

## 2019-02-13 PROCEDURE — 700111 HCHG RX REV CODE 636 W/ 250 OVERRIDE (IP): Performed by: PSYCHIATRY & NEUROLOGY

## 2019-02-13 PROCEDURE — 97535 SELF CARE MNGMENT TRAINING: CPT

## 2019-02-13 RX ORDER — GABAPENTIN 300 MG/1
300 CAPSULE ORAL 3 TIMES DAILY
Qty: 42 CAP | Refills: 0 | Status: SHIPPED | OUTPATIENT
Start: 2019-02-13 | End: 2019-02-27

## 2019-02-13 RX ORDER — CLONAZEPAM 0.5 MG/1
0.5 TABLET ORAL 3 TIMES DAILY
Qty: 42 TAB | Refills: 0 | Status: SHIPPED | OUTPATIENT
Start: 2019-02-13 | End: 2019-02-27

## 2019-02-13 RX ORDER — TRAZODONE HYDROCHLORIDE 50 MG/1
50 TABLET ORAL
Qty: 14 TAB | Refills: 0 | Status: SHIPPED | OUTPATIENT
Start: 2019-02-13 | End: 2019-02-27

## 2019-02-13 RX ORDER — OLANZAPINE 15 MG/1
15 TABLET ORAL EVERY EVENING
Qty: 14 TAB | Refills: 0 | Status: SHIPPED | OUTPATIENT
Start: 2019-02-13 | End: 2019-02-27

## 2019-02-13 RX ADMIN — TRAMADOL HYDROCHLORIDE 100 MG: 50 TABLET, COATED ORAL at 05:41

## 2019-02-13 RX ADMIN — GABAPENTIN 300 MG: 300 CAPSULE ORAL at 10:27

## 2019-02-13 RX ADMIN — CLONAZEPAM 0.5 MG: 0.5 TABLET ORAL at 10:27

## 2019-02-13 RX ADMIN — GABAPENTIN 300 MG: 300 CAPSULE ORAL at 05:41

## 2019-02-13 RX ADMIN — ENOXAPARIN SODIUM 30 MG: 100 INJECTION SUBCUTANEOUS at 05:41

## 2019-02-13 RX ADMIN — ZIPRASIDONE MESYLATE 10 MG: 20 INJECTION, POWDER, LYOPHILIZED, FOR SOLUTION INTRAMUSCULAR at 06:00

## 2019-02-13 RX ADMIN — OXYCODONE AND ACETAMINOPHEN 1 TABLET: 5; 325 TABLET ORAL at 05:41

## 2019-02-13 RX ADMIN — NICOTINE 14 MG: 14 PATCH, EXTENDED RELEASE TRANSDERMAL at 05:41

## 2019-02-13 ASSESSMENT — ENCOUNTER SYMPTOMS
FOCAL WEAKNESS: 0
DOUBLE VISION: 0
FEVER: 0
ABDOMINAL PAIN: 0
SPEECH CHANGE: 0
VOMITING: 0
SHORTNESS OF BREATH: 0
NECK PAIN: 1
SENSORY CHANGE: 0
NAUSEA: 0
CHILLS: 0

## 2019-02-13 ASSESSMENT — COGNITIVE AND FUNCTIONAL STATUS - GENERAL
DAILY ACTIVITIY SCORE: 24
SUGGESTED CMS G CODE MODIFIER DAILY ACTIVITY: CH

## 2019-02-13 NOTE — PROGRESS NOTES
Patient became anxious and agitated during change of shift and began following the oncoming nurse around the unit during report.   Patient refused to return to his room and became more agitated.  Patient threatened to put his head through the window. Security was called, but had not shown up at the time of this note.  Patient now sitting at nurse's station with PSA sitter.

## 2019-02-13 NOTE — THERAPY
"Occupational Therapy Treatment completed with focus on IADLs.  Functional Status:  Pt ambulating in room w/o AD pre session. Pt completed simulated money management worksheet w/ max cues. Pt able to read directions aloud appropriately but unable to correctly record answer on worksheet. Required cues to follow directions appropriately and to correct mistakes. Pt continues to have impaired cognition, limiting ability to complete IADLs w/o assistance. Continue to recommend that pt would benefit from 24/7 supv upon appropriate d/c home.   Plan of Care: Will benefit from Occupational Therapy 1 times per week  Discharge Recommendations:  Equipment Will Continue to Assess for Equipment Needs.     See \"Rehab Therapy-Acute\" Patient Summary Report for complete documentation.   "

## 2019-02-13 NOTE — PROGRESS NOTES
Pharmacy Pharmacotherapy Consult   LOS >30 days  Admit Date: 1/5/2019    Medications were reviewed for appropriateness and ongoing need.   Current Facility-Administered Medications   Medication Dose Route Frequency Provider Last Rate Last Dose   • nicotine (NICODERM) 14 MG/24HR 14 mg  14 mg Transdermal Daily-0600 Elly Chavez, A.P.R.N.   14 mg at 02/13/19 0541   • ibuprofen (MOTRIN) tablet 600 mg  600 mg Oral Q6HRS PRN Jade Frazier, A.P.N.   600 mg at 02/10/19 1711   • nicotine polacrilex (NICORETTE) 2 MG piece 2 mg  2 mg Oral Q2HRS PRN Jade Frazier, A.P.N.   2 mg at 02/01/19 1107   • ziprasidone (GEODON) injection 10 mg  10 mg Intramuscular Q4HRS PRN Lore Caal M.D.   10 mg at 02/13/19 0600   • clonazePAM (KLONOPIN) tablet 0.5 mg  0.5 mg Oral TID Lore Caal M.D.   0.5 mg at 02/13/19 1027   • gabapentin (NEURONTIN) capsule 300 mg  300 mg Oral TID Lore Caal M.D.   300 mg at 02/13/19 1027   • OLANZapine (ZYPREXA) tablet 15 mg  15 mg Oral Q EVENING CALEB GarciaDKate   15 mg at 02/12/19 1655   • ondansetron (ZOFRAN ODT) dispertab 4 mg  4 mg Oral Q4HRS PRN Jade Frazier, A.P.N.   4 mg at 02/06/19 1951   • acetaminophen (TYLENOL) tablet 650 mg  650 mg Oral Q6HRS PRN Kemi Martinez, A.P.R.N.   650 mg at 02/11/19 2004   • traZODone (DESYREL) tablet 50 mg  50 mg Oral QHS Lore Caal M.D.   50 mg at 02/12/19 2100   • tramadol (ULTRAM) 50 MG tablet  mg   mg Oral Q6HRS PRN Kemi Martinez, A.P.R.N.   100 mg at 02/13/19 0541   • oxyCODONE-acetaminophen (PERCOCET) 5-325 MG per tablet 1 Tab  1 Tab Oral Q6HRS PRN Kemi Martinez, A.P.R.N.   1 Tab at 02/13/19 0541   • OLANZapine (ZYPREXA) tablet 5 mg  5 mg Oral HS PRN Lore Caal M.D.   5 mg at 02/12/19 1941   • calcium carbonate (TUMS) chewable tab 500 mg  500 mg Oral TID PRN Alexia Felipe, A.P.N.   500 mg at 02/05/19 2026   • enoxaparin (LOVENOX) inj 30 mg  30 mg  Subcutaneous Q12HRS Rich Simon M.D.   30 mg at 02/13/19 0541   • Respiratory Care per Protocol   Nebulization Continuous RT PETER LunaRCATINA       • Pharmacy Consult Request ...Pain Management Review 1 Each  1 Each Other PRN PETER LunaRLEAH.       • docusate sodium (COLACE) capsule 100 mg  100 mg Oral BID BRIGIDA LunaP.RKateNKate   100 mg at 02/12/19 1655   • senna-docusate (PERICOLACE or SENOKOT S) 8.6-50 MG per tablet 1 Tab  1 Tab Oral Nightly BRIGIDA LunaP.R.N.   1 Tab at 02/11/19 2041   • senna-docusate (PERICOLACE or SENOKOT S) 8.6-50 MG per tablet 1 Tab  1 Tab Oral Q24HRS PRN BRIGIDA LunaP.R.N.   1 Tab at 02/04/19 2108   • polyethylene glycol/lytes (MIRALAX) PACKET 1 Packet  1 Packet Oral BID BRIGIDA LunaP.R.N.   Stopped at 02/08/19 1800   • magnesium hydroxide (MILK OF MAGNESIA) suspension 30 mL  30 mL Oral DAILY BRIGIDA LunaPKateR.N.   Stopped at 02/08/19 0600   • bisacodyl (DULCOLAX) suppository 10 mg  10 mg Rectal Q24HRS PRN BRIGIDA LunaPKateRLEAH.       • fleet enema 133 mL  1 Each Rectal Once PRN BRIGIDA LunaPKateRCATINA         Recommendations:  1. Pt has not been using PRN bisacodyl suppository, fleet enema, or Nicorette gum in last 10 days or longer. Recommend discontinuing these medications.     2. Pt currently on enoxaparin and has been using PRN Ibuprofen. Pt may be at increased risk from bleeding. Recommend using tylenol for pain if needed.     Sandra Zambrano, Pharmacy Intern

## 2019-02-13 NOTE — DISCHARGE INSTRUCTIONS
Discharge Instructions    Comments: 1.  Call or seek medical attention if questions or concerns arise   2.  Follow up with Dr. Fidel Coleman, neurosurgery, within 2 weeks time for repeat imaging, as needed, or if symptoms worsen   3.  Follow up with Primary Care Physician within 1 weeks time for ongoing assessment and or psychiatry disorder, as needed, or if symptoms worsen.  If no primary care provider please establish.  If unable to establish may follow up with [facility] or [facility] clinics   4.  No nonsteroidal antiinflammatories cleared by neurosurgery, Dr. Fidel Coleman   5.  Cervical collar on at all times.  Change pads while lying flat and maintaining spinal precautions   6.  No contact sports, heavy lifting, or strenuous activities until cleared by primary care provider and neurosurgeon   7.  24 hour 7 day a week supervision until cleared by medical provider   8. Outpatient speech therapy for ongoing cognitive assessment and treatment, (script provided)   9.  No operation of machinery or motorized vehicles until cleared by primary care provider and neurosurgeon   9. No alcohol use until cleared by primary care provider and neurosurgeon   10,  Heart healthy diet   11. Seek immediate medical attention for neurologic decompensation and/or new or worsening psychiatric condition.    Discharged to home by car with relative. Discharged via walking, hospital escort: Yes.  Special equipment needed: C-Collar    Be sure to schedule a follow-up appointment with your primary care doctor or any specialists as instructed.     Discharge Plan:   Diet Plan: Discussed  Activity Level: Discussed  Smoking Cessation Offered: Patient Counseled  Confirmed Follow up Appointment: Appointment Scheduled  Confirmed Symptoms Management: Discussed  Medication Reconciliation Updated: Yes  Influenza Vaccine Indication: Indicated: 9 to 64 years of age  Influenza Vaccine Given - only chart on this line when given: Influenza Vaccine Given (See  MAR)    I understand that a diet low in cholesterol, fat, and sodium is recommended for good health. Unless I have been given specific instructions below for another diet, I accept this instruction as my diet prescription.   Other diet: regular    Special Instructions: Discharge instructions for the Orthopedic Patient    Follow up with Primary Care Physician within 2 weeks of discharge to home, regarding:  Review of medications and diagnostic testing.  Surveillance for medical complications.  Workup and treatment of osteoporosis, if appropriate.     -Is this a Joint Replacement patient? No    -Is this patient being discharged with medication to prevent blood clots?  No    · Is patient discharged on Warfarin / Coumadin?   No     Depression / Suicide Risk    As you are discharged from this [facility] facility, it is important to learn how to keep safe from harming yourself.    Recognize the warning signs:  · Abrupt changes in personality, positive or negative- including increase in energy   · Giving away possessions  · Change in eating patterns- significant weight changes-  positive or negative  · Change in sleeping patterns- unable to sleep or sleeping all the time   · Unwillingness or inability to communicate  · Depression  · Unusual sadness, discouragement and loneliness  · Talk of wanting to die  · Neglect of personal appearance   · Rebelliousness- reckless behavior  · Withdrawal from people/activities they love  · Confusion- inability to concentrate     If you or a loved one observes any of these behaviors or has concerns about self-harm, here's what you can do:  · Talk about it- your feelings and reasons for harming yourself  · Remove any means that you might use to hurt yourself (examples: pills, rope, extension cords, firearm)  · Get professional help from the community (Mental Health, Substance Abuse, psychological counseling)  · Do not be alone:Call your Safe Contact- someone whom you trust who will be  there for you.  · Call your local CRISIS HOTLINE 763-2509 or 239-157-5127  · Call your local Children's Mobile Crisis Response Team [place] (960) 944-1198 or www.Rentobo  · Call the toll free National Suicide Prevention Hotlines   · National Suicide Prevention Lifeline 491-276-MBLY (2385)  · National Hope Line Network 800-SUICIDE (747-2223)    Cervical Collar  A cervical collar is a device that supports your chin and the back of your head. It is used after a severe neck injury to protect your head and neck. It does this by restricting the movement of the top part of your spine, which is located in your neck. A cervical collar may be used when you have:  · A fractured neck.  · Ligament damage.  · A spinal cord injury.  WHAT INSTRUCTIONS SHOULD I FOLLOW?  · Wear the collar for as long as your health care provider instructs.  · Follow your health care provider's instructions about how to put on and take off your collar.  · Do not make your collar so tight that you feel pain or it is hard for you to breathe.  · Do not remove the collar unless your health care provider says it is okay. Ask your health care provider if you can remove the collar for showering or eating or to apply ice.  · Do not drive a car until your health care provider says it is okay.  · Keep all follow-up visits as directed by your health care provider. This is important. Any delay in getting necessary care can keep your injury from healing properly.  · Apply ice to the injured area:  ¨ Put ice in a plastic bag.  ¨ Place a towel between your skin and the bag.  ¨ Leave the ice on for 20 minutes, 2-3 times per day for the first 2 days.     This information is not intended to replace advice given to you by your health care provider. Make sure you discuss any questions you have with your health care provider.     Document Released: 09/09/2005 Document Revised: 01/08/2016 Document Reviewed: 07/27/2015  [employer] Interactive Patient Education  ©2016 [employer]
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
## 2019-02-13 NOTE — PROGRESS NOTES
Family present, able to provide 24/7 supervision upon discharge.  Tertiary exam completed   Disposition: Discharge  Counseled     Patient seen, data reviewed and discussed.  Agree with assessment and plan.  ANUJ

## 2019-02-13 NOTE — PROGRESS NOTES
Education provided on proper use of Cervical Collar.  Pt voiced understanding and demonstrated proper putting on and taking off. Voiced understanding that Collar is to be worn at all times and taken off minimally for skin care.
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
## 2019-02-13 NOTE — PROGRESS NOTES
"Patient very agitated and anxious at the beginning of shift. Patient started to follow this RN around the unit before previous RN could give report. Patient requesting that medication be given before report is given. Requested that patient wait until shift change is complete. Unreceptive and making threats that there is nothing stopping him from leaving and \"we can not keep him here.\" Also threatening to punch any security guards that try to stop him from leaving. Requested prn dose of Geodon from pharmacy. Pharmacy reports that there is not any Geodon in the facility and there is some on order from another hospital and it may be a few hours before medication is here. Patient informed that the medication is not available at this time. Patient willing to take his scheduled and prn anxiety medications at this time. Patient continues to walk around unit and saying \" I can leave when I want to.\" 1:1 supervision with patient. Able to encourage back to his room at this time.       2215: Patient resting comfortably at this time. Appears to be asleep. This writer did receive a dose of Geodon. Will hold on to dose until patient awakens. Medication is ordered Q 4 prn. Last dose was given this at approx. 1300. 1:1 supervision remains at bedside.      0145: Patient remains asleep at this time.    0400: Patient did wake up and walked to the kitchenette with PSA for a snack. Sat quietly and ate the snack and went back to sleep. No agitation or anxiety expressed.    0530: Patient awake requesting pain medication. Much more pleasant and being respectful. Also requesting IM Geodon for agitation. 10 mg given in left deltoid. NO other requests from patient. 1:1 supervision remains at bedside.   "

## 2019-02-14 ENCOUNTER — HOSPITAL ENCOUNTER (EMERGENCY)
Facility: MEDICAL CENTER | Age: 44
End: 2019-02-14
Attending: EMERGENCY MEDICINE
Payer: OTHER MISCELLANEOUS

## 2019-02-14 ENCOUNTER — HOSPITAL ENCOUNTER (EMERGENCY)
Facility: MEDICAL CENTER | Age: 44
End: 2019-02-14
Payer: OTHER MISCELLANEOUS

## 2019-02-14 VITALS
DIASTOLIC BLOOD PRESSURE: 99 MMHG | TEMPERATURE: 98.2 F | OXYGEN SATURATION: 98 % | HEART RATE: 98 BPM | HEIGHT: 66 IN | WEIGHT: 161.6 LBS | SYSTOLIC BLOOD PRESSURE: 135 MMHG | BODY MASS INDEX: 25.97 KG/M2 | RESPIRATION RATE: 18 BRPM

## 2019-02-14 VITALS
DIASTOLIC BLOOD PRESSURE: 107 MMHG | SYSTOLIC BLOOD PRESSURE: 147 MMHG | HEART RATE: 89 BPM | RESPIRATION RATE: 20 BRPM | BODY MASS INDEX: 25.88 KG/M2 | WEIGHT: 161 LBS | OXYGEN SATURATION: 97 % | TEMPERATURE: 98.1 F | HEIGHT: 66 IN

## 2019-02-14 DIAGNOSIS — M54.2 NECK PAIN: ICD-10-CM

## 2019-02-14 DIAGNOSIS — R20.2 PARESTHESIAS: ICD-10-CM

## 2019-02-14 PROCEDURE — 99284 EMERGENCY DEPT VISIT MOD MDM: CPT

## 2019-02-14 PROCEDURE — 302449 STATCHG TRIAGE ONLY (STATISTIC)

## 2019-02-14 PROCEDURE — A9270 NON-COVERED ITEM OR SERVICE: HCPCS

## 2019-02-14 PROCEDURE — 700102 HCHG RX REV CODE 250 W/ 637 OVERRIDE(OP)

## 2019-02-14 RX ORDER — LORAZEPAM 1 MG/1
2 TABLET ORAL ONCE
Status: COMPLETED | OUTPATIENT
Start: 2019-02-14 | End: 2019-02-14

## 2019-02-14 RX ORDER — HYDROCODONE BITARTRATE AND ACETAMINOPHEN 5; 325 MG/1; MG/1
1 TABLET ORAL ONCE
Status: COMPLETED | OUTPATIENT
Start: 2019-02-14 | End: 2019-02-14

## 2019-02-14 RX ADMIN — HYDROCODONE BITARTRATE AND ACETAMINOPHEN 1 TABLET: 5; 325 TABLET ORAL at 01:41

## 2019-02-14 RX ADMIN — LORAZEPAM 2 MG: 1 TABLET ORAL at 01:41

## 2019-02-14 ASSESSMENT — LIFESTYLE VARIABLES
CONSUMPTION TOTAL: INCOMPLETE
TOTAL SCORE: 0
EVER FELT BAD OR GUILTY ABOUT YOUR DRINKING: NO
TOTAL SCORE: 0
TOTAL SCORE: 0
HAVE YOU EVER FELT YOU SHOULD CUT DOWN ON YOUR DRINKING: NO
DO YOU DRINK ALCOHOL: YES
HAVE PEOPLE ANNOYED YOU BY CRITICIZING YOUR DRINKING: NO
EVER HAD A DRINK FIRST THING IN THE MORNING TO STEADY YOUR NERVES TO GET RID OF A HANGOVER: NO
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
## 2019-02-14 NOTE — ED NOTES
Discharge instructions given to patient, a verbal understanding of all instructions was stated.Pt preferred to walk out stating he had money for a taxi cab to get himself home. Dr. Harvey approved of this pt's plan to get himself home. VSS, all belongings accounted for.

## 2019-02-14 NOTE — ED TRIAGE NOTES
"Pt. Ambulatory to [facility] with steady gait. Pt. States numbness and tingling in right arm/hand and down to right leg/foot. No noted weakness upon assessment. Pt. States that he took his permanent c-collar off tonight to take a shower and \"stretched\" his neck while taking a shower and then heard multiple \"clicking\" sounds in his c-spine region. Pt. States he then felt a sharp pain shoot down from his neck into the right side of his body. Pt. Updated on POC for ERP to see. Pt. Refusing to sit in [facility] at this time stating he feels \"twitchy\" and preferring to walk around room. Pt. Steady on feet with no deficits noted to gait. Call light is within reach. Will continue to monitor.   "
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
## 2019-02-14 NOTE — ED NOTES
MICHI consulted regarding getting a hold of family for this pt. Pt. Continues to pace around room at this time. C-collar remains in place after repeated education with pt. Will continue to monitor.
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
## 2019-02-15 ENCOUNTER — HOSPITAL ENCOUNTER (EMERGENCY)
Facility: MEDICAL CENTER | Age: 44
End: 2019-02-15
Attending: EMERGENCY MEDICINE
Payer: COMMERCIAL

## 2019-02-15 VITALS
RESPIRATION RATE: 12 BRPM | WEIGHT: 161 LBS | HEART RATE: 111 BPM | SYSTOLIC BLOOD PRESSURE: 115 MMHG | DIASTOLIC BLOOD PRESSURE: 85 MMHG | OXYGEN SATURATION: 95 % | TEMPERATURE: 97.5 F | BODY MASS INDEX: 25.99 KG/M2

## 2019-02-15 DIAGNOSIS — S12.201A CLOSED NONDISPLACED FRACTURE OF THIRD CERVICAL VERTEBRA, UNSPECIFIED FRACTURE MORPHOLOGY, INITIAL ENCOUNTER (HCC): Primary | ICD-10-CM

## 2019-02-15 DIAGNOSIS — M54.2 NECK PAIN: ICD-10-CM

## 2019-02-15 DIAGNOSIS — T14.90XA TRAUMA: ICD-10-CM

## 2019-02-15 PROCEDURE — 700111 HCHG RX REV CODE 636 W/ 250 OVERRIDE (IP): Performed by: EMERGENCY MEDICINE

## 2019-02-15 PROCEDURE — 99284 EMERGENCY DEPT VISIT MOD MDM: CPT

## 2019-02-15 PROCEDURE — 96372 THER/PROPH/DIAG INJ SC/IM: CPT

## 2019-02-15 RX ORDER — ONDANSETRON 2 MG/ML
4 INJECTION INTRAMUSCULAR; INTRAVENOUS ONCE
Status: COMPLETED | OUTPATIENT
Start: 2019-02-15 | End: 2019-02-15

## 2019-02-15 RX ORDER — MORPHINE SULFATE 10 MG/ML
8 INJECTION, SOLUTION INTRAMUSCULAR; INTRAVENOUS ONCE
Status: COMPLETED | OUTPATIENT
Start: 2019-02-15 | End: 2019-02-15

## 2019-02-15 RX ORDER — HYDROCODONE BITARTRATE AND ACETAMINOPHEN 5; 325 MG/1; MG/1
1-2 TABLET ORAL EVERY 6 HOURS PRN
Qty: 25 TAB | Refills: 0 | Status: SHIPPED | OUTPATIENT
Start: 2019-02-15 | End: 2019-02-22

## 2019-02-15 RX ADMIN — MORPHINE SULFATE 8 MG: 10 INJECTION INTRAVENOUS at 17:01

## 2019-02-15 RX ADMIN — ONDANSETRON 4 MG: 2 INJECTION INTRAMUSCULAR; INTRAVENOUS at 17:01

## 2019-02-15 ASSESSMENT — ENCOUNTER SYMPTOMS: NECK PAIN: 1
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
## 2019-02-16 NOTE — ED TRIAGE NOTES
Pt BIB family with c/c of neck pain. Pt requesting narcotics for pain control, stating he refused them upon discharge.

## 2019-02-18 ENCOUNTER — APPOINTMENT (OUTPATIENT)
Dept: RADIOLOGY | Facility: MEDICAL CENTER | Age: 44
End: 2019-02-18
Attending: EMERGENCY MEDICINE
Payer: OTHER MISCELLANEOUS

## 2019-02-18 ENCOUNTER — HOSPITAL ENCOUNTER (EMERGENCY)
Facility: MEDICAL CENTER | Age: 44
End: 2019-02-18
Attending: EMERGENCY MEDICINE
Payer: OTHER MISCELLANEOUS

## 2019-02-18 VITALS
WEIGHT: 153 LBS | RESPIRATION RATE: 16 BRPM | DIASTOLIC BLOOD PRESSURE: 93 MMHG | HEIGHT: 67 IN | OXYGEN SATURATION: 99 % | TEMPERATURE: 98.6 F | HEART RATE: 82 BPM | SYSTOLIC BLOOD PRESSURE: 137 MMHG | BODY MASS INDEX: 24.01 KG/M2

## 2019-02-18 DIAGNOSIS — M54.2 NECK PAIN: ICD-10-CM

## 2019-02-18 PROCEDURE — 72125 CT NECK SPINE W/O DYE: CPT

## 2019-02-18 PROCEDURE — 96374 THER/PROPH/DIAG INJ IV PUSH: CPT

## 2019-02-18 PROCEDURE — 99284 EMERGENCY DEPT VISIT MOD MDM: CPT

## 2019-02-18 PROCEDURE — 700111 HCHG RX REV CODE 636 W/ 250 OVERRIDE (IP): Performed by: EMERGENCY MEDICINE

## 2019-02-18 RX ORDER — HYDROMORPHONE HYDROCHLORIDE 1 MG/ML
0.5 INJECTION, SOLUTION INTRAMUSCULAR; INTRAVENOUS; SUBCUTANEOUS ONCE
Status: COMPLETED | OUTPATIENT
Start: 2019-02-18 | End: 2019-02-18

## 2019-02-18 RX ORDER — CYCLOBENZAPRINE HCL 10 MG
10 TABLET ORAL 2 TIMES DAILY PRN
Qty: 15 TAB | Refills: 0 | Status: SHIPPED
Start: 2019-02-18 | End: 2019-02-18

## 2019-02-18 RX ORDER — HYDROMORPHONE HYDROCHLORIDE 1 MG/ML
0.5 INJECTION, SOLUTION INTRAMUSCULAR; INTRAVENOUS; SUBCUTANEOUS ONCE
Status: DISCONTINUED | OUTPATIENT
Start: 2019-02-18 | End: 2019-02-18

## 2019-02-18 RX ORDER — IBUPROFEN 800 MG/1
800 TABLET ORAL EVERY 8 HOURS PRN
Qty: 30 TAB | Refills: 0 | Status: SHIPPED
Start: 2019-02-18

## 2019-02-18 RX ORDER — IBUPROFEN 800 MG/1
800 TABLET ORAL EVERY 8 HOURS PRN
Qty: 30 TAB | Refills: 0 | Status: SHIPPED
Start: 2019-02-18 | End: 2019-02-18

## 2019-02-18 RX ORDER — CYCLOBENZAPRINE HCL 10 MG
10 TABLET ORAL 2 TIMES DAILY PRN
Qty: 15 TAB | Refills: 0 | Status: SHIPPED
Start: 2019-02-18

## 2019-02-18 RX ADMIN — HYDROMORPHONE HYDROCHLORIDE 0.5 MG: 1 INJECTION, SOLUTION INTRAMUSCULAR; INTRAVENOUS; SUBCUTANEOUS at 20:53

## 2019-02-18 ASSESSMENT — LIFESTYLE VARIABLES: DO YOU DRINK ALCOHOL: NO
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
## 2019-02-19 NOTE — ED NOTES
Assist RN: Pt reporting pain. MD aware. No pharmacologic treatment for pain at this time. Ice pack provided.

## 2019-02-19 NOTE — ED NOTES
Pt assisted to B14 via wheelchair, agree with triage note.  Pt still in T-shirt due to difficulty removing with C-collar in place.

## 2019-02-27 ENCOUNTER — HOSPITAL ENCOUNTER (EMERGENCY)
Facility: HOSPITAL | Age: 44
Discharge: HOME | End: 2019-02-27
Attending: EMERGENCY MEDICINE
Payer: MEDICAID

## 2019-02-27 VITALS
HEIGHT: 67 IN | OXYGEN SATURATION: 99 % | RESPIRATION RATE: 18 BRPM | HEART RATE: 70 BPM | BODY MASS INDEX: 25.58 KG/M2 | DIASTOLIC BLOOD PRESSURE: 86 MMHG | WEIGHT: 163 LBS | SYSTOLIC BLOOD PRESSURE: 127 MMHG | TEMPERATURE: 99 F

## 2019-02-27 DIAGNOSIS — M25.511 CHRONIC RIGHT SHOULDER PAIN: ICD-10-CM

## 2019-02-27 DIAGNOSIS — M54.2 NECK PAIN: ICD-10-CM

## 2019-02-27 DIAGNOSIS — R20.2 PARESTHESIAS: ICD-10-CM

## 2019-02-27 DIAGNOSIS — G89.29 CHRONIC RIGHT SHOULDER PAIN: ICD-10-CM

## 2019-02-27 DIAGNOSIS — M47.22 OSTEOARTHRITIS OF SPINE WITH RADICULOPATHY, CERVICAL REGION: Primary | ICD-10-CM

## 2019-02-27 LAB
BUN SERPL-MCNC: 23 MG/DL (ref 6–30)
CHLORIDE SERPL-SCNC: 107 MMOL/L (ref 95–110)
CREAT SERPL-MCNC: 1 MG/DL (ref 0.5–1.4)
GLUCOSE SERPL-MCNC: 111 MG/DL (ref 70–110)
HCT VFR BLD CALC: 37 %PCV (ref 36–54)
POC IONIZED CALCIUM: 1.09 MMOL/L (ref 1.06–1.42)
POC TCO2 (MEASURED): 25 MMOL/L (ref 23–29)
POTASSIUM BLD-SCNC: 4.1 MMOL/L (ref 3.5–5.1)
SAMPLE: ABNORMAL
SODIUM BLD-SCNC: 143 MMOL/L (ref 136–145)

## 2019-02-27 PROCEDURE — 25000003 PHARM REV CODE 250: Performed by: PHYSICIAN ASSISTANT

## 2019-02-27 PROCEDURE — 96374 THER/PROPH/DIAG INJ IV PUSH: CPT

## 2019-02-27 PROCEDURE — 63600175 PHARM REV CODE 636 W HCPCS: Performed by: PHYSICIAN ASSISTANT

## 2019-02-27 PROCEDURE — 96375 TX/PRO/DX INJ NEW DRUG ADDON: CPT

## 2019-02-27 PROCEDURE — 99285 EMERGENCY DEPT VISIT HI MDM: CPT | Mod: 25

## 2019-02-27 PROCEDURE — 99284 PR EMERGENCY DEPT VISIT,LEVEL IV: ICD-10-PCS | Mod: ,,, | Performed by: PHYSICIAN ASSISTANT

## 2019-02-27 PROCEDURE — 99284 EMERGENCY DEPT VISIT MOD MDM: CPT | Mod: ,,, | Performed by: PHYSICIAN ASSISTANT

## 2019-02-27 RX ORDER — METHOCARBAMOL 500 MG/1
1000 TABLET, FILM COATED ORAL 3 TIMES DAILY
Qty: 30 TABLET | Refills: 0 | Status: SHIPPED | OUTPATIENT
Start: 2019-02-27 | End: 2019-03-04

## 2019-02-27 RX ORDER — LIDOCAINE 50 MG/G
1 PATCH TOPICAL DAILY
Qty: 15 PATCH | Refills: 0 | Status: SHIPPED | OUTPATIENT
Start: 2019-02-27 | End: 2023-08-06

## 2019-02-27 RX ORDER — ORPHENADRINE CITRATE 30 MG/ML
60 INJECTION INTRAMUSCULAR; INTRAVENOUS
Status: COMPLETED | OUTPATIENT
Start: 2019-02-27 | End: 2019-02-27

## 2019-02-27 RX ORDER — IBUPROFEN 200 MG
200 TABLET ORAL EVERY 6 HOURS PRN
COMMUNITY
End: 2019-12-18 | Stop reason: HOSPADM

## 2019-02-27 RX ORDER — HYDROCODONE BITARTRATE AND ACETAMINOPHEN 5; 325 MG/1; MG/1
1 TABLET ORAL EVERY 6 HOURS PRN
COMMUNITY
End: 2023-08-06

## 2019-02-27 RX ORDER — NAPROXEN SODIUM 220 MG
220 TABLET ORAL
COMMUNITY
End: 2019-02-27 | Stop reason: SDUPTHER

## 2019-02-27 RX ORDER — NAPROXEN 500 MG/1
500 TABLET ORAL 2 TIMES DAILY WITH MEALS
Qty: 20 TABLET | Refills: 0 | Status: SHIPPED | OUTPATIENT
Start: 2019-02-27 | End: 2019-12-18 | Stop reason: HOSPADM

## 2019-02-27 RX ORDER — KETOROLAC TROMETHAMINE 30 MG/ML
15 INJECTION, SOLUTION INTRAMUSCULAR; INTRAVENOUS
Status: COMPLETED | OUTPATIENT
Start: 2019-02-27 | End: 2019-02-27

## 2019-02-27 RX ORDER — ACETAMINOPHEN 500 MG
1000 TABLET ORAL
Status: COMPLETED | OUTPATIENT
Start: 2019-02-27 | End: 2019-02-27

## 2019-02-27 RX ADMIN — ORPHENADRINE CITRATE 60 MG: 30 INJECTION INTRAMUSCULAR; INTRAVENOUS at 06:02

## 2019-02-27 RX ADMIN — ACETAMINOPHEN 1000 MG: 500 TABLET ORAL at 07:02

## 2019-02-27 RX ADMIN — KETOROLAC TROMETHAMINE 15 MG: 30 INJECTION, SOLUTION INTRAMUSCULAR at 06:02

## 2019-02-28 NOTE — DISCHARGE INSTRUCTIONS
You have mild joint space narrowing of the bones in your neck  Please follow-up with a primary care provider for ongoing management. You may use the resources attached to establish care with a provider  Please return to the Emergency Room for new, worsening, or concerning symptoms.
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
## 2019-12-17 ENCOUNTER — HOSPITAL ENCOUNTER (OUTPATIENT)
Facility: HOSPITAL | Age: 44
Discharge: HOME | End: 2019-12-18
Attending: EMERGENCY MEDICINE | Admitting: INTERNAL MEDICINE
Payer: MEDICAID

## 2019-12-17 DIAGNOSIS — I50.22 CHRONIC SYSTOLIC HEART FAILURE: ICD-10-CM

## 2019-12-17 DIAGNOSIS — I51.3 MURAL THROMBUS OF CARDIAC APEX: Primary | ICD-10-CM

## 2019-12-17 DIAGNOSIS — I25.119 CORONARY ARTERY DISEASE INVOLVING NATIVE CORONARY ARTERY OF NATIVE HEART WITH ANGINA PECTORIS: ICD-10-CM

## 2019-12-17 DIAGNOSIS — R07.9 CHEST PAIN: ICD-10-CM

## 2019-12-17 PROBLEM — F19.10 SUBSTANCE ABUSE: Status: ACTIVE | Noted: 2019-12-17

## 2019-12-17 PROBLEM — R07.89 OTHER CHEST PAIN: Status: ACTIVE | Noted: 2019-12-17

## 2019-12-17 LAB
ALBUMIN SERPL BCP-MCNC: 3.3 G/DL (ref 3.5–5.2)
ALP SERPL-CCNC: 64 U/L (ref 55–135)
ALT SERPL W/O P-5'-P-CCNC: 23 U/L (ref 10–44)
ANION GAP SERPL CALC-SCNC: 9 MMOL/L (ref 8–16)
AORTIC ROOT ANNULUS: 3 CM
AST SERPL-CCNC: 17 U/L (ref 10–40)
BASOPHILS # BLD AUTO: 0.01 K/UL (ref 0–0.2)
BASOPHILS # BLD AUTO: 0.01 K/UL (ref 0–0.2)
BASOPHILS NFR BLD: 0.1 % (ref 0–1.9)
BASOPHILS NFR BLD: 0.1 % (ref 0–1.9)
BILIRUB SERPL-MCNC: 0.5 MG/DL (ref 0.1–1)
BNP SERPL-MCNC: 156 PG/ML (ref 0–99)
BUN SERPL-MCNC: 15 MG/DL (ref 6–20)
CALCIUM SERPL-MCNC: 8.7 MG/DL (ref 8.7–10.5)
CHLORIDE SERPL-SCNC: 107 MMOL/L (ref 95–110)
CO2 SERPL-SCNC: 25 MMOL/L (ref 23–29)
CREAT SERPL-MCNC: 1 MG/DL (ref 0.5–1.4)
CV ECHO LV RWT: 0.32 CM
DIFFERENTIAL METHOD: ABNORMAL
DIFFERENTIAL METHOD: ABNORMAL
ECHO LV POSTERIOR WALL: 0.9 CM (ref 0.6–1.1)
EOSINOPHIL # BLD AUTO: 0.1 K/UL (ref 0–0.5)
EOSINOPHIL # BLD AUTO: 0.1 K/UL (ref 0–0.5)
EOSINOPHIL NFR BLD: 1.2 % (ref 0–8)
EOSINOPHIL NFR BLD: 1.6 % (ref 0–8)
ERYTHROCYTE [DISTWIDTH] IN BLOOD BY AUTOMATED COUNT: 13.4 % (ref 11.5–14.5)
ERYTHROCYTE [DISTWIDTH] IN BLOOD BY AUTOMATED COUNT: 13.4 % (ref 11.5–14.5)
EST. GFR  (AFRICAN AMERICAN): >60 ML/MIN/1.73 M^2
EST. GFR  (NON AFRICAN AMERICAN): >60 ML/MIN/1.73 M^2
FRACTIONAL SHORTENING: 25 % (ref 28–44)
GLUCOSE SERPL-MCNC: 110 MG/DL (ref 70–110)
HCT VFR BLD AUTO: 40.4 % (ref 40–54)
HCT VFR BLD AUTO: 43.9 % (ref 40–54)
HGB BLD-MCNC: 13.2 G/DL (ref 14–18)
HGB BLD-MCNC: 14.3 G/DL (ref 14–18)
INR PPP: 1.4 (ref 0.8–1.2)
INTERVENTRICULAR SEPTUM: 0.9 CM (ref 0.6–1.1)
LEFT ATRIUM SIZE: 3.3 CM
LEFT INTERNAL DIMENSION IN SYSTOLE: 4.3 CM (ref 2.1–4)
LEFT VENTRICULAR INTERNAL DIMENSION IN DIASTOLE: 5.7 CM (ref 3.5–6)
LEFT VENTRICULAR MASS: 197.52 G
LYMPHOCYTES # BLD AUTO: 1.3 K/UL (ref 1–4.8)
LYMPHOCYTES # BLD AUTO: 1.6 K/UL (ref 1–4.8)
LYMPHOCYTES NFR BLD: 16.8 % (ref 18–48)
LYMPHOCYTES NFR BLD: 18.8 % (ref 18–48)
MCH RBC QN AUTO: 30.5 PG (ref 27–31)
MCH RBC QN AUTO: 30.6 PG (ref 27–31)
MCHC RBC AUTO-ENTMCNC: 32.6 G/DL (ref 32–36)
MCHC RBC AUTO-ENTMCNC: 32.7 G/DL (ref 32–36)
MCV RBC AUTO: 94 FL (ref 82–98)
MCV RBC AUTO: 94 FL (ref 82–98)
MONOCYTES # BLD AUTO: 0.3 K/UL (ref 0.3–1)
MONOCYTES # BLD AUTO: 0.5 K/UL (ref 0.3–1)
MONOCYTES NFR BLD: 4.6 % (ref 4–15)
MONOCYTES NFR BLD: 5.5 % (ref 4–15)
NEUTROPHILS # BLD AUTO: 5.7 K/UL (ref 1.8–7.7)
NEUTROPHILS # BLD AUTO: 6.4 K/UL (ref 1.8–7.7)
NEUTROPHILS NFR BLD: 74.4 % (ref 38–73)
NEUTROPHILS NFR BLD: 76.9 % (ref 38–73)
PLATELET # BLD AUTO: 344 K/UL (ref 150–350)
PLATELET # BLD AUTO: 359 K/UL (ref 150–350)
PMV BLD AUTO: 8.5 FL (ref 9.2–12.9)
PMV BLD AUTO: 8.9 FL (ref 9.2–12.9)
POTASSIUM SERPL-SCNC: 4 MMOL/L (ref 3.5–5.1)
PROT SERPL-MCNC: 6.5 G/DL (ref 6–8.4)
PROTHROMBIN TIME: 14 SEC (ref 9–12.5)
RBC # BLD AUTO: 4.32 M/UL (ref 4.6–6.2)
RBC # BLD AUTO: 4.69 M/UL (ref 4.6–6.2)
SODIUM SERPL-SCNC: 141 MMOL/L (ref 136–145)
TROPONIN I SERPL DL<=0.01 NG/ML-MCNC: <0.006 NG/ML (ref 0–0.03)
WBC # BLD AUTO: 7.42 K/UL (ref 3.9–12.7)
WBC # BLD AUTO: 8.61 K/UL (ref 3.9–12.7)

## 2019-12-17 PROCEDURE — 96375 TX/PRO/DX INJ NEW DRUG ADDON: CPT

## 2019-12-17 PROCEDURE — 93010 ELECTROCARDIOGRAM REPORT: CPT | Mod: ,,, | Performed by: STUDENT IN AN ORGANIZED HEALTH CARE EDUCATION/TRAINING PROGRAM

## 2019-12-17 PROCEDURE — 83880 ASSAY OF NATRIURETIC PEPTIDE: CPT

## 2019-12-17 PROCEDURE — 63600175 PHARM REV CODE 636 W HCPCS: Performed by: EMERGENCY MEDICINE

## 2019-12-17 PROCEDURE — 93010 EKG 12-LEAD: ICD-10-PCS | Mod: ,,, | Performed by: STUDENT IN AN ORGANIZED HEALTH CARE EDUCATION/TRAINING PROGRAM

## 2019-12-17 PROCEDURE — 96376 TX/PRO/DX INJ SAME DRUG ADON: CPT

## 2019-12-17 PROCEDURE — 80307 DRUG TEST PRSMV CHEM ANLYZR: CPT

## 2019-12-17 PROCEDURE — 99285 EMERGENCY DEPT VISIT HI MDM: CPT | Mod: 25

## 2019-12-17 PROCEDURE — 84484 ASSAY OF TROPONIN QUANT: CPT | Mod: 91

## 2019-12-17 PROCEDURE — 25000003 PHARM REV CODE 250: Performed by: STUDENT IN AN ORGANIZED HEALTH CARE EDUCATION/TRAINING PROGRAM

## 2019-12-17 PROCEDURE — G0378 HOSPITAL OBSERVATION PER HR: HCPCS

## 2019-12-17 PROCEDURE — 63600175 PHARM REV CODE 636 W HCPCS: Performed by: STUDENT IN AN ORGANIZED HEALTH CARE EDUCATION/TRAINING PROGRAM

## 2019-12-17 PROCEDURE — 85610 PROTHROMBIN TIME: CPT

## 2019-12-17 PROCEDURE — 25000003 PHARM REV CODE 250: Performed by: EMERGENCY MEDICINE

## 2019-12-17 PROCEDURE — 85025 COMPLETE CBC W/AUTO DIFF WBC: CPT | Mod: 91

## 2019-12-17 PROCEDURE — 80053 COMPREHEN METABOLIC PANEL: CPT

## 2019-12-17 PROCEDURE — 96365 THER/PROPH/DIAG IV INF INIT: CPT | Mod: 59

## 2019-12-17 PROCEDURE — 93005 ELECTROCARDIOGRAM TRACING: CPT

## 2019-12-17 PROCEDURE — 99220 PR INITIAL OBSERVATION CARE,LEVL III: ICD-10-PCS | Mod: 25,,, | Performed by: STUDENT IN AN ORGANIZED HEALTH CARE EDUCATION/TRAINING PROGRAM

## 2019-12-17 PROCEDURE — 99220 PR INITIAL OBSERVATION CARE,LEVL III: CPT | Mod: 25,,, | Performed by: STUDENT IN AN ORGANIZED HEALTH CARE EDUCATION/TRAINING PROGRAM

## 2019-12-17 RX ORDER — ACETAMINOPHEN 325 MG/1
650 TABLET ORAL ONCE
Status: COMPLETED | OUTPATIENT
Start: 2019-12-17 | End: 2019-12-17

## 2019-12-17 RX ORDER — GLUCAGON 1 MG
1 KIT INJECTION
Status: DISCONTINUED | OUTPATIENT
Start: 2019-12-17 | End: 2019-12-18 | Stop reason: HOSPADM

## 2019-12-17 RX ORDER — ACETAMINOPHEN 325 MG/1
650 TABLET ORAL ONCE
Status: DISCONTINUED | OUTPATIENT
Start: 2019-12-17 | End: 2019-12-18 | Stop reason: HOSPADM

## 2019-12-17 RX ORDER — TALC
9 POWDER (GRAM) TOPICAL NIGHTLY PRN
Status: DISCONTINUED | OUTPATIENT
Start: 2019-12-17 | End: 2019-12-18 | Stop reason: HOSPADM

## 2019-12-17 RX ORDER — IBUPROFEN 200 MG
24 TABLET ORAL
Status: DISCONTINUED | OUTPATIENT
Start: 2019-12-17 | End: 2019-12-18 | Stop reason: HOSPADM

## 2019-12-17 RX ORDER — ATORVASTATIN CALCIUM 80 MG/1
80 TABLET, FILM COATED ORAL DAILY
COMMUNITY
End: 2023-08-06

## 2019-12-17 RX ORDER — SODIUM CHLORIDE 0.9 % (FLUSH) 0.9 %
10 SYRINGE (ML) INJECTION
Status: DISCONTINUED | OUTPATIENT
Start: 2019-12-17 | End: 2019-12-18 | Stop reason: HOSPADM

## 2019-12-17 RX ORDER — NITROGLYCERIN 0.4 MG/1
0.4 TABLET SUBLINGUAL
Status: COMPLETED | OUTPATIENT
Start: 2019-12-17 | End: 2019-12-17

## 2019-12-17 RX ORDER — HEPARIN SODIUM,PORCINE/D5W 25000/250
12 INTRAVENOUS SOLUTION INTRAVENOUS CONTINUOUS
Status: DISCONTINUED | OUTPATIENT
Start: 2019-12-17 | End: 2019-12-18

## 2019-12-17 RX ORDER — NITROGLYCERIN 0.3 MG/1
0.3 TABLET SUBLINGUAL EVERY 5 MIN PRN
COMMUNITY
End: 2023-08-06

## 2019-12-17 RX ORDER — ASPIRIN 325 MG
325 TABLET ORAL
Status: COMPLETED | OUTPATIENT
Start: 2019-12-17 | End: 2019-12-17

## 2019-12-17 RX ORDER — LISINOPRIL 2.5 MG/1
2.5 TABLET ORAL DAILY
COMMUNITY
End: 2023-08-06

## 2019-12-17 RX ORDER — CLOPIDOGREL BISULFATE 75 MG/1
75 TABLET ORAL DAILY
COMMUNITY
End: 2023-08-06

## 2019-12-17 RX ORDER — IBUPROFEN 200 MG
16 TABLET ORAL
Status: DISCONTINUED | OUTPATIENT
Start: 2019-12-17 | End: 2019-12-18 | Stop reason: HOSPADM

## 2019-12-17 RX ORDER — ONDANSETRON 2 MG/ML
4 INJECTION INTRAMUSCULAR; INTRAVENOUS EVERY 6 HOURS PRN
Status: DISCONTINUED | OUTPATIENT
Start: 2019-12-17 | End: 2019-12-18 | Stop reason: HOSPADM

## 2019-12-17 RX ORDER — METOPROLOL SUCCINATE 25 MG/1
25 TABLET, EXTENDED RELEASE ORAL DAILY
COMMUNITY
End: 2023-08-06

## 2019-12-17 RX ORDER — WARFARIN SODIUM 5 MG/1
5 TABLET ORAL DAILY
COMMUNITY
End: 2023-08-06

## 2019-12-17 RX ORDER — ACETAMINOPHEN 500 MG
1000 TABLET ORAL
Status: ACTIVE | OUTPATIENT
Start: 2019-12-17 | End: 2019-12-18

## 2019-12-17 RX ADMIN — Medication 9 MG: at 10:12

## 2019-12-17 RX ADMIN — ASPIRIN 325 MG ORAL TABLET 325 MG: 325 PILL ORAL at 01:12

## 2019-12-17 RX ADMIN — NITROGLYCERIN 0.4 MG: 0.4 TABLET, ORALLY DISINTEGRATING SUBLINGUAL at 01:12

## 2019-12-17 RX ADMIN — ACETAMINOPHEN 650 MG: 325 TABLET ORAL at 10:12

## 2019-12-17 RX ADMIN — HEPARIN SODIUM AND DEXTROSE 12 UNITS/KG/HR: 10000; 5 INJECTION INTRAVENOUS at 08:12

## 2019-12-17 RX ADMIN — LORAZEPAM 1 MG: 2 INJECTION INTRAMUSCULAR; INTRAVENOUS at 10:12

## 2019-12-17 NOTE — HPI
Ganesh Estrada is a 44 y.o. 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   EKG is without acute ischemic changes.  Patient reports compliance with medications including Plavix and Coumadin. He reports having a stent placed at [facility] 2 weeks ago. Labs are pending at this time.

## 2019-12-17 NOTE — ED TRIAGE NOTES
Pt reports pain to his chest. Patient also report pain to his neck but attributes it to him having arthritis in his neck. Patient denies N/V patient states states he is SOB, pt sats were  on room air.

## 2019-12-17 NOTE — SUBJECTIVE & OBJECTIVE
No past medical history on file.    No past surgical history on file.    Review of patient's allergies indicates:  No Known Allergies    No current facility-administered medications on file prior to encounter.      Current Outpatient Medications on File Prior to Encounter   Medication Sig    HYDROcodone-acetaminophen (NORCO) 5-325 mg per tablet Take 1 tablet by mouth every 6 (six) hours as needed for Pain.    ibuprofen (ADVIL,MOTRIN) 200 MG tablet Take 200 mg by mouth every 6 (six) hours as needed for Pain.    lidocaine (LIDODERM) 5 % Place 1 patch onto the skin once daily. Remove & Discard patch within 12 hours or as directed by MD    naproxen (NAPROSYN) 500 MG tablet Take 1 tablet (500 mg total) by mouth 2 (two) times daily with meals.     Family History     None        Tobacco Use    Smoking status: Not on file   Substance and Sexual Activity    Alcohol use: Not on file    Drug use: Not on file    Sexual activity: Not on file     Review of Systems   Constitution: Negative for diaphoresis and malaise/fatigue.   HENT: Negative.    Eyes: Negative.    Cardiovascular: Positive for chest pain. Negative for dyspnea on exertion, irregular heartbeat, leg swelling, near-syncope, orthopnea, palpitations, paroxysmal nocturnal dyspnea and syncope.   Respiratory: Negative for cough, shortness of breath and wheezing.    Endocrine: Negative.    Hematologic/Lymphatic: Bruises/bleeds easily.   Skin: Negative.    Musculoskeletal: Positive for arthritis and neck pain.   Gastrointestinal: Negative.    Genitourinary: Negative.    Neurological: Negative.    Psychiatric/Behavioral: Positive for substance abuse.   Allergic/Immunologic: Negative.      Objective:     Vital Signs (Most Recent):  Temp: 98.2 °F (36.8 °C) (12/17/19 1233)  Pulse: 74 (12/17/19 1233)  Resp: (!) 22 (12/17/19 1233)  BP: (!) 141/101 (12/17/19 1233)  SpO2: 98 % (12/17/19 1233) Vital Signs (24h Range):  Temp:  [98.2 °F (36.8 °C)] 98.2 °F (36.8 °C)  Pulse:   [74] 74  Resp:  [22] 22  SpO2:  [98 %] 98 %  BP: (141)/(101) 141/101        There is no height or weight on file to calculate BMI.    SpO2: 98 %  O2 Device (Oxygen Therapy): nasal cannula    No intake or output data in the 24 hours ending 12/17/19 1340    Lines/Drains/Airways     Peripheral Intravenous Line                 Peripheral IV - Single Lumen 02/27/19 1842 Right Antecubital 292 days                Physical Exam   Constitutional: He is oriented to person, place, and time. No distress.   HENT:   Head: Atraumatic.   Eyes: Right eye exhibits no discharge. Left eye exhibits no discharge.   Neck: No JVD present.   Cardiovascular: Normal rate, regular rhythm and normal heart sounds. Exam reveals no gallop and no friction rub.   No murmur heard.  Pulmonary/Chest: Effort normal and breath sounds normal.   Abdominal: Soft. Bowel sounds are normal.   Musculoskeletal: He exhibits no edema or tenderness.   Neurological: He is alert and oriented to person, place, and time.   Skin: Skin is warm and dry. He is not diaphoretic.   Psychiatric: He has a normal mood and affect.       Significant Labs:   BMP:   Recent Labs   Lab 12/17/19  1254         K 4.0      CO2 25   BUN 15   CREATININE 1.0   CALCIUM 8.7   , CMP   Recent Labs   Lab 12/17/19  1254      K 4.0      CO2 25      BUN 15   CREATININE 1.0   CALCIUM 8.7   PROT 6.5   ALBUMIN 3.3*   BILITOT 0.5   ALKPHOS 64   AST 17   ALT 23   ANIONGAP 9   ESTGFRAFRICA >60   EGFRNONAA >60   , CBC   Recent Labs   Lab 12/17/19  1254   WBC 7.42   HGB 13.2*   HCT 40.4   *   , INR No results for input(s): INR, PROTIME in the last 48 hours., Lipid Panel No results for input(s): CHOL, HDL, LDLCALC, TRIG, CHOLHDL in the last 48 hours., Troponin No results for input(s): TROPONINI in the last 48 hours. and All pertinent lab results from the last 24 hours have been reviewed.    Significant Imaging: Echocardiogram: 2D echo with color flow doppler: No  results found for this or any previous visit. and Transthoracic echo (TTE) complete (Cupid Only): No results found for this or any previous visit.

## 2019-12-17 NOTE — ED PROVIDER NOTES
"Encounter Date: 12/17/2019    SCRIBE #1 NOTE: I, Roberto Diana, am scribing for, and in the presence of,  . I have scribed the entire note.       History     Chief Complaint   Patient presents with    Chest Pain     EMS reports that pt started having chest pain this morning. Denies N/V. EMS was unable to get 12 lead. Pt reports shortness of breath.      Ganesh Estrada is a 44 y.o. male who  has no past medical history on file.    The patient presents to the ED due to CP that started at 0600 this morning. He states he was walking to [employer] when the CP started. The pain is constant, radiates to his jaw and arms. Deep breaths exacerbates the pain. He has associated SOB. His prior MI had similar symptoms. Patient states he took his usual medications and a Nitro without relief. He denies sweating, chills, N/V, or other complaints.  Pt was told he has a "blood clot behind his heart." Pt has a hx of a MI. Patient had stents placed, 2 months ago and another placed 14 days ago at another facility. He has a hx of arthritis which contributese to his current neck pain.      The history is provided by the patient.     Review of patient's allergies indicates:  No Known Allergies  No past medical history on file.  No past surgical history on file.  No family history on file.  Social History     Tobacco Use    Smoking status: Not on file   Substance Use Topics    Alcohol use: Not on file    Drug use: Not on file     Review of Systems   Constitutional: Positive for fatigue. Negative for activity change, diaphoresis and fever.   HENT: Negative for drooling, rhinorrhea, sore throat and trouble swallowing.    Eyes: Negative for pain and visual disturbance.   Respiratory: Positive for shortness of breath. Negative for cough and stridor.    Cardiovascular: Positive for chest pain. Negative for leg swelling.   Gastrointestinal: Positive for abdominal pain. Negative for abdominal distention, constipation and vomiting. "   Genitourinary: Negative for discharge, dysuria and hematuria.   Musculoskeletal: Positive for neck pain. Negative for gait problem.   Skin: Negative for rash.   Neurological: Negative for seizures, facial asymmetry and headaches.   Psychiatric/Behavioral: Negative for hallucinations and suicidal ideas.       Physical Exam     Initial Vitals [12/17/19 1233]   BP Pulse Resp Temp SpO2   (!) 141/101 74 (!) 22 98.2 °F (36.8 °C) 98 %      MAP       --         Physical Exam    Nursing note and vitals reviewed.  Constitutional: He appears well-developed. No distress.   HENT:   Head: Normocephalic and atraumatic.   Nose: Nose normal.   Eyes: EOM are normal.   Neck: Neck supple. No tracheal deviation present. No JVD present.   Cardiovascular: Normal rate, regular rhythm, normal heart sounds and intact distal pulses. Exam reveals no gallop and no friction rub.    No murmur heard.  Pulmonary/Chest: Breath sounds normal. Tachypnea noted. No respiratory distress. He has no wheezes. He has no rhonchi. He has no rales.   Abdominal: Soft. Bowel sounds are normal. There is no tenderness.   Musculoskeletal: Normal range of motion.   Neurological: He is alert and oriented to person, place, and time. No cranial nerve deficit.   Skin: Skin is warm and dry. Capillary refill takes less than 2 seconds. No rash noted.   Psychiatric: His mood appears anxious.         ED Course   Procedures  Labs Reviewed   CBC W/ AUTO DIFFERENTIAL - Abnormal; Notable for the following components:       Result Value    RBC 4.32 (*)     Hemoglobin 13.2 (*)     Platelets 359 (*)     MPV 8.9 (*)     Gran% 76.9 (*)     Lymph% 16.8 (*)     All other components within normal limits   COMPREHENSIVE METABOLIC PANEL   TROPONIN I   TROPONIN I   B-TYPE NATRIURETIC PEPTIDE     EKG Readings: (Independently Interpreted)   NSR  rate 71  Nml intervals  T wave inversion V1-V4  Mild ST elevations V1 and V2  No STEMI       Imaging Results    None                            ED  Course as of Dec 18 0803   Tue Dec 17, 2019   1248 Spoke to , Cardiology. Says the EKG does not look like anything acute but it does have T wave inversions. Recommends to treat pt with ASA and Nitro, and he will come evaluate    [KM]   1341 44 y.o. male, w/ PMH of CAD s/p stent (5/2019 at [facility]), Apical thrombus non complaint on warfarin, HFrEF (20-25%), opiate abuse pw chest pain. Cards consulted. Initial tropoin neg. Will admit to medicine for ACS work up.    [BD]   1344 Cath  Right dominant coronary system  - Left main. Patent  - Left anterior descending. Proximal 50% stenosis, mid 99% atherothrombotic stenosis with SHAMA 1 flow distally, distally diffuse disease up to 30% stenosis   - Circumflex. Proximal patent, OM1 large vessel with proximal 70% focal stenosis, OM2 patent  - Right coronary artery. Proximal patent, mid with diffuse heavy plaque burden and 100% chronic total occlusion with left to right filling distally.      [BD]      ED Course User Index  [BD] Roel Edmonds MD  [KM] Roberto Diana                Clinical Impression:       ICD-10-CM ICD-9-CM   1. Chest pain R07.9 786.50                 scribe attestation    Attending Attestation:     Physician Attestation for Scribe:    I, Dr. Roel Edmodns, personally performed the services described in this documentation.   All medical record entries made by the scribe were at my direction and in my presence.   I have reviewed the chart and agree that the record is accurate and complete.   Roel Edmonds MD  8:05 AM 12/18/2019     DISCLAIMER: This note was prepared with [employer] Naturally Speaking voice recognition transcription software. Garbled syntax, mangled pronouns, and other bizarre constructions may be attributed to that software system.               Roel Edmonds MD  12/18/19 [id number]

## 2019-12-17 NOTE — ASSESSMENT & PLAN NOTE
Reports history of CAD s/p stent (5/2019 at [facility]), then 12/2019 at [facility]  Reports compliance with medications including Plavix and Coumadin, not on asa due to risk of bleeding with triple therapy  Continue statin, BB, ACEI    EKG is without acute ischemic changes  Troponin #1 0.006  Trend troponin, EKG  Recommend admission to [facility]- Hospital Medicine service

## 2019-12-17 NOTE — ASSESSMENT & PLAN NOTE
LVEF 20-25% per outside records with persistent LV thrombus with medical noncompliance  Patient is euvolemic on exam  Continue GDMT

## 2019-12-17 NOTE — CONSULTS
[facility]  Cardiology  Consult Note    Patient Name: Ganesh Estrada  MRN: [id number]  Admission Date: 12/17/2019  Hospital Length of Stay: 0 days  Code Status: No Order   Attending Provider: Roel Edmonds MD   Consulting Provider: Jacob Guzman NP  Primary Care Physician: Primary Doctor No  Principal Problem:<principal problem not specified>    Patient information was obtained from patient, past medical records and ER records.     Consults  Subjective:     Chief Complaint:  Chest pain, neck pain     HPI:   Ganesh Estrada is a 44 y.o. male, w/ PMH of CAD s/p stent (5/2019 at [facility]), LV apex thrombus non complaint on warfarin, CKD, HFrEF (20-25%), marijuana, opiate and cocaine abuse who presented to the ED with chest pain that radiates to his jaw, left arm, and neck.   EKG is without acute ischemic changes.  Patient reports compliance with medications including Plavix and Coumadin. He reports having a stent placed at [facility] 2 weeks ago. Labs are pending at this time.     No past medical history on file.    No past surgical history on file.    Review of patient's allergies indicates:  No Known Allergies    No current facility-administered medications on file prior to encounter.      Current Outpatient Medications on File Prior to Encounter   Medication Sig    HYDROcodone-acetaminophen (NORCO) 5-325 mg per tablet Take 1 tablet by mouth every 6 (six) hours as needed for Pain.    ibuprofen (ADVIL,MOTRIN) 200 MG tablet Take 200 mg by mouth every 6 (six) hours as needed for Pain.    lidocaine (LIDODERM) 5 % Place 1 patch onto the skin once daily. Remove & Discard patch within 12 hours or as directed by MD    naproxen (NAPROSYN) 500 MG tablet Take 1 tablet (500 mg total) by mouth 2 (two) times daily with meals.     Family History     None        Tobacco Use    Smoking status: Not on file   Substance and Sexual Activity    Alcohol use: Not on file    Drug use: Not on file    Sexual activity:  Not on file     Review of Systems   Constitution: Negative for diaphoresis and malaise/fatigue.   HENT: Negative.    Eyes: Negative.    Cardiovascular: Positive for chest pain. Negative for dyspnea on exertion, irregular heartbeat, leg swelling, near-syncope, orthopnea, palpitations, paroxysmal nocturnal dyspnea and syncope.   Respiratory: Negative for cough, shortness of breath and wheezing.    Endocrine: Negative.    Hematologic/Lymphatic: Bruises/bleeds easily.   Skin: Negative.    Musculoskeletal: Positive for arthritis and neck pain.   Gastrointestinal: Negative.    Genitourinary: Negative.    Neurological: Negative.    Psychiatric/Behavioral: Positive for substance abuse.   Allergic/Immunologic: Negative.      Objective:     Vital Signs (Most Recent):  Temp: 98.2 °F (36.8 °C) (12/17/19 1233)  Pulse: 74 (12/17/19 1233)  Resp: (!) 22 (12/17/19 1233)  BP: (!) 141/101 (12/17/19 1233)  SpO2: 98 % (12/17/19 1233) Vital Signs (24h Range):  Temp:  [98.2 °F (36.8 °C)] 98.2 °F (36.8 °C)  Pulse:  [74] 74  Resp:  [22] 22  SpO2:  [98 %] 98 %  BP: (141)/(101) 141/101        There is no height or weight on file to calculate BMI.    SpO2: 98 %  O2 Device (Oxygen Therapy): nasal cannula    No intake or output data in the 24 hours ending 12/17/19 1340    Lines/Drains/Airways     Peripheral Intravenous Line                 Peripheral IV - Single Lumen 02/27/19 1842 Right Antecubital 292 days                Physical Exam   Constitutional: He is oriented to person, place, and time. No distress.   HENT:   Head: Atraumatic.   Eyes: Right eye exhibits no discharge. Left eye exhibits no discharge.   Neck: No JVD present.   Cardiovascular: Normal rate, regular rhythm and normal heart sounds. Exam reveals no gallop and no friction rub.   No murmur heard.  Pulmonary/Chest: Effort normal and breath sounds normal.   Abdominal: Soft. Bowel sounds are normal.   Musculoskeletal: He exhibits no edema or tenderness.   Neurological: He is alert  and oriented to person, place, and time.   Skin: Skin is warm and dry. He is not diaphoretic.   Psychiatric: He has a normal mood and affect.       Significant Labs:   BMP:   Recent Labs   Lab 12/17/19  1254         K 4.0      CO2 25   BUN 15   CREATININE 1.0   CALCIUM 8.7   , CMP   Recent Labs   Lab 12/17/19  1254      K 4.0      CO2 25      BUN 15   CREATININE 1.0   CALCIUM 8.7   PROT 6.5   ALBUMIN 3.3*   BILITOT 0.5   ALKPHOS 64   AST 17   ALT 23   ANIONGAP 9   ESTGFRAFRICA >60   EGFRNONAA >60   , CBC   Recent Labs   Lab 12/17/19  1254   WBC 7.42   HGB 13.2*   HCT 40.4   *   , INR No results for input(s): INR, PROTIME in the last 48 hours., Lipid Panel No results for input(s): CHOL, HDL, LDLCALC, TRIG, CHOLHDL in the last 48 hours., Troponin No results for input(s): TROPONINI in the last 48 hours. and All pertinent lab results from the last 24 hours have been reviewed.    Significant Imaging: Echocardiogram: 2D echo with color flow doppler: No results found for this or any previous visit. and Transthoracic echo (TTE) complete (Cupid Only): No results found for this or any previous visit.    Assessment and Plan:     Chronic systolic heart failure  LVEF 20-25% per outside records with persistent LV thrombus with medical noncompliance  Patient is euvolemic on exam  Continue GDMT    Mural thrombus of cardiac apex  INR pending  If subtherapeutic, bridge with Lovenox    Coronary artery disease involving native coronary artery of native heart with angina pectoris  Reports history of CAD s/p stent (5/2019 at [facility]), then 12/2019 at [facility]  Reports compliance with medications including Plavix and Coumadin, not on asa due to risk of bleeding with triple therapy  Continue statin, BB, ACEI    EKG is without acute ischemic changes  Troponin #1 0.006  Trend troponin, EKG  Recommend admission to CDU- Hospital Medicine service    Substance abuse  Patient with longstanding history of  cocaine, marijuana, and heroin abuse  UDS pending  Cessation encouraged        VTE Risk Mitigation (From admission, onward)    None          Thank you for your consult. I will follow-up with patient. Please contact us if you have any additional questions.    Jacob Guzman NP  Cardiology   [facility]

## 2019-12-17 NOTE — HOSPITAL COURSE
12/17/2019 Per HPI  12/18/2019 Serial troponin and EKG negative.   TTE:  · Severely decreased left ventricular systolic function. The estimated ejection fraction is 25%  · No LV thrombus (optison)    INR subtherapeutic at 1.4. Hemodynamically stable.

## 2019-12-17 NOTE — ASSESSMENT & PLAN NOTE
Patient with longstanding history of cocaine, marijuana, and heroin abuse  UDS pending  Cessation encouraged

## 2019-12-18 ENCOUNTER — CLINICAL SUPPORT (OUTPATIENT)
Dept: SMOKING CESSATION | Facility: CLINIC | Age: 44
End: 2019-12-18

## 2019-12-18 VITALS
OXYGEN SATURATION: 98 % | HEIGHT: 67 IN | TEMPERATURE: 98 F | HEART RATE: 77 BPM | DIASTOLIC BLOOD PRESSURE: 90 MMHG | WEIGHT: 155.19 LBS | BODY MASS INDEX: 24.36 KG/M2 | SYSTOLIC BLOOD PRESSURE: 152 MMHG | RESPIRATION RATE: 17 BRPM

## 2019-12-18 DIAGNOSIS — F17.210 CIGARETTE SMOKER: Primary | ICD-10-CM

## 2019-12-18 PROBLEM — R07.89 NON-CARDIAC CHEST PAIN: Status: ACTIVE | Noted: 2019-12-17

## 2019-12-18 LAB
ALBUMIN SERPL BCP-MCNC: 3.4 G/DL (ref 3.5–5.2)
ALP SERPL-CCNC: 66 U/L (ref 55–135)
ALT SERPL W/O P-5'-P-CCNC: 23 U/L (ref 10–44)
ANION GAP SERPL CALC-SCNC: 13 MMOL/L (ref 8–16)
APTT BLDCRRT: 35.1 SEC (ref 21–32)
APTT BLDCRRT: 48.4 SEC (ref 21–32)
AST SERPL-CCNC: 16 U/L (ref 10–40)
BASOPHILS # BLD AUTO: 0.02 K/UL (ref 0–0.2)
BASOPHILS NFR BLD: 0.2 % (ref 0–1.9)
BILIRUB SERPL-MCNC: 0.5 MG/DL (ref 0.1–1)
BUN SERPL-MCNC: 16 MG/DL (ref 6–20)
CALCIUM SERPL-MCNC: 8.8 MG/DL (ref 8.7–10.5)
CHLORIDE SERPL-SCNC: 108 MMOL/L (ref 95–110)
CO2 SERPL-SCNC: 21 MMOL/L (ref 23–29)
CREAT SERPL-MCNC: 1 MG/DL (ref 0.5–1.4)
DIFFERENTIAL METHOD: ABNORMAL
EOSINOPHIL # BLD AUTO: 0.2 K/UL (ref 0–0.5)
EOSINOPHIL NFR BLD: 2.3 % (ref 0–8)
ERYTHROCYTE [DISTWIDTH] IN BLOOD BY AUTOMATED COUNT: 13.5 % (ref 11.5–14.5)
EST. GFR  (AFRICAN AMERICAN): >60 ML/MIN/1.73 M^2
EST. GFR  (NON AFRICAN AMERICAN): >60 ML/MIN/1.73 M^2
GLUCOSE SERPL-MCNC: 107 MG/DL (ref 70–110)
HCT VFR BLD AUTO: 41.3 % (ref 40–54)
HGB BLD-MCNC: 13.7 G/DL (ref 14–18)
LYMPHOCYTES # BLD AUTO: 2.2 K/UL (ref 1–4.8)
LYMPHOCYTES NFR BLD: 25.3 % (ref 18–48)
MAGNESIUM SERPL-MCNC: 2.2 MG/DL (ref 1.6–2.6)
MCH RBC QN AUTO: 30.8 PG (ref 27–31)
MCHC RBC AUTO-ENTMCNC: 33.2 G/DL (ref 32–36)
MCV RBC AUTO: 93 FL (ref 82–98)
MONOCYTES # BLD AUTO: 0.5 K/UL (ref 0.3–1)
MONOCYTES NFR BLD: 5.7 % (ref 4–15)
NEUTROPHILS # BLD AUTO: 5.7 K/UL (ref 1.8–7.7)
NEUTROPHILS NFR BLD: 66.3 % (ref 38–73)
PHOSPHATE SERPL-MCNC: 3.3 MG/DL (ref 2.7–4.5)
PLATELET # BLD AUTO: 373 K/UL (ref 150–350)
PMV BLD AUTO: 9 FL (ref 9.2–12.9)
POTASSIUM SERPL-SCNC: 3.6 MMOL/L (ref 3.5–5.1)
PROT SERPL-MCNC: 6.6 G/DL (ref 6–8.4)
RBC # BLD AUTO: 4.45 M/UL (ref 4.6–6.2)
SODIUM SERPL-SCNC: 142 MMOL/L (ref 136–145)
TROPONIN I SERPL DL<=0.01 NG/ML-MCNC: <0.006 NG/ML (ref 0–0.03)
WBC # BLD AUTO: 8.59 K/UL (ref 3.9–12.7)

## 2019-12-18 PROCEDURE — G0378 HOSPITAL OBSERVATION PER HR: HCPCS

## 2019-12-18 PROCEDURE — 96376 TX/PRO/DX INJ SAME DRUG ADON: CPT

## 2019-12-18 PROCEDURE — 99225 PR SUBSEQUENT OBSERVATION CARE,LEVEL II: CPT | Mod: ,,, | Performed by: STUDENT IN AN ORGANIZED HEALTH CARE EDUCATION/TRAINING PROGRAM

## 2019-12-18 PROCEDURE — 84484 ASSAY OF TROPONIN QUANT: CPT

## 2019-12-18 PROCEDURE — 85025 COMPLETE CBC W/AUTO DIFF WBC: CPT

## 2019-12-18 PROCEDURE — 80053 COMPREHEN METABOLIC PANEL: CPT

## 2019-12-18 PROCEDURE — 99225 PR SUBSEQUENT OBSERVATION CARE,LEVEL II: ICD-10-PCS | Mod: ,,, | Performed by: STUDENT IN AN ORGANIZED HEALTH CARE EDUCATION/TRAINING PROGRAM

## 2019-12-18 PROCEDURE — 84100 ASSAY OF PHOSPHORUS: CPT

## 2019-12-18 PROCEDURE — 99406 BEHAV CHNG SMOKING 3-10 MIN: CPT | Mod: S$GLB,,,

## 2019-12-18 PROCEDURE — 25000003 PHARM REV CODE 250: Performed by: STUDENT IN AN ORGANIZED HEALTH CARE EDUCATION/TRAINING PROGRAM

## 2019-12-18 PROCEDURE — 63600175 PHARM REV CODE 636 W HCPCS: Performed by: INTERNAL MEDICINE

## 2019-12-18 PROCEDURE — 90471 IMMUNIZATION ADMIN: CPT | Performed by: INTERNAL MEDICINE

## 2019-12-18 PROCEDURE — 99226 PR SUBSEQUENT OBSERVATION CARE,LEVEL III: ICD-10-PCS | Mod: ,,, | Performed by: NURSE PRACTITIONER

## 2019-12-18 PROCEDURE — 90686 IIV4 VACC NO PRSV 0.5 ML IM: CPT | Performed by: INTERNAL MEDICINE

## 2019-12-18 PROCEDURE — 85730 THROMBOPLASTIN TIME PARTIAL: CPT

## 2019-12-18 PROCEDURE — 99226 PR SUBSEQUENT OBSERVATION CARE,LEVEL III: CPT | Mod: ,,, | Performed by: NURSE PRACTITIONER

## 2019-12-18 PROCEDURE — 36415 COLL VENOUS BLD VENIPUNCTURE: CPT

## 2019-12-18 PROCEDURE — 63600175 PHARM REV CODE 636 W HCPCS: Performed by: STUDENT IN AN ORGANIZED HEALTH CARE EDUCATION/TRAINING PROGRAM

## 2019-12-18 PROCEDURE — 99406 PT REFUSED TOBACCO CESSATION: ICD-10-PCS | Mod: S$GLB,,,

## 2019-12-18 PROCEDURE — 83735 ASSAY OF MAGNESIUM: CPT

## 2019-12-18 RX ORDER — LISINOPRIL 2.5 MG/1
2.5 TABLET ORAL DAILY
Status: DISCONTINUED | OUTPATIENT
Start: 2019-12-18 | End: 2019-12-18 | Stop reason: HOSPADM

## 2019-12-18 RX ORDER — HEPARIN SODIUM,PORCINE/D5W 25000/250
12 INTRAVENOUS SOLUTION INTRAVENOUS CONTINUOUS
Status: DISCONTINUED | OUTPATIENT
Start: 2019-12-18 | End: 2019-12-18 | Stop reason: HOSPADM

## 2019-12-18 RX ORDER — METOPROLOL SUCCINATE 25 MG/1
25 TABLET, EXTENDED RELEASE ORAL DAILY
Status: DISCONTINUED | OUTPATIENT
Start: 2019-12-18 | End: 2019-12-18 | Stop reason: HOSPADM

## 2019-12-18 RX ORDER — CLOPIDOGREL BISULFATE 75 MG/1
75 TABLET ORAL DAILY
Status: DISCONTINUED | OUTPATIENT
Start: 2019-12-18 | End: 2019-12-18 | Stop reason: HOSPADM

## 2019-12-18 RX ORDER — ASPIRIN 81 MG/1
81 TABLET ORAL DAILY
Qty: 30 TABLET | Refills: 5 | Status: SHIPPED | OUTPATIENT
Start: 2019-12-18 | End: 2023-08-06

## 2019-12-18 RX ORDER — ATORVASTATIN CALCIUM 40 MG/1
80 TABLET, FILM COATED ORAL NIGHTLY
Status: DISCONTINUED | OUTPATIENT
Start: 2019-12-18 | End: 2019-12-18 | Stop reason: HOSPADM

## 2019-12-18 RX ADMIN — METOPROLOL SUCCINATE 25 MG: 25 TABLET, FILM COATED, EXTENDED RELEASE ORAL at 08:12

## 2019-12-18 RX ADMIN — LORAZEPAM 2 MG: 2 INJECTION INTRAMUSCULAR; INTRAVENOUS at 02:12

## 2019-12-18 RX ADMIN — INFLUENZA VIRUS VACCINE 0.5 ML: 15; 15; 15; 15 SUSPENSION INTRAMUSCULAR at 03:12

## 2019-12-18 RX ADMIN — LISINOPRIL 2.5 MG: 2.5 TABLET ORAL at 08:12

## 2019-12-18 RX ADMIN — CLOPIDOGREL BISULFATE 75 MG: 75 TABLET ORAL at 08:12

## 2019-12-18 NOTE — NURSING
Patient remained stable during shift. V/S stable, no sign of acute distress. Patient asleep in bed at this time, needs met, no c/o. Anxiety well managed by most recent dose of Ativan. Heparin drip infusing at new rate according to aPTT result this A.M. Bed locked in lowest position, call light in reach, side rails up x 2. Tele in place and functioning, bed alarm set.

## 2019-12-18 NOTE — PROGRESS NOTES
Smoking cessation education provided. Pt denies nicotine withdrawal symptoms. Pt states not ready to quit- declines enrollment in [employer] at this time. Handout provided for Ambulatory Smoking Cessation program.

## 2019-12-18 NOTE — ASSESSMENT & PLAN NOTE
Reports history of CAD s/p stent (5/2019 at [facility]), then 12/2019 at [facility]  Reports compliance with medications including Plavix and Coumadin, not on asa due to risk of bleeding with triple therapy  Continue statin, BB, ACEI    EKG is without acute ischemic changes  Serial Troponin < 0.006  No indication for further ischemic evaluation at this time

## 2019-12-18 NOTE — PLAN OF CARE
Problem: Fall Injury Risk  Goal: Absence of Fall and Fall-Related Injury  Outcome: Ongoing, Progressing     Problem: Adult Inpatient Plan of Care  Goal: Plan of Care Review  Outcome: Ongoing, Progressing  Goal: Patient-Specific Goal (Individualization)  Outcome: Ongoing, Progressing  Goal: Absence of Hospital-Acquired Illness or Injury  Outcome: Ongoing, Progressing  Goal: Optimal Comfort and Wellbeing  Outcome: Ongoing, Progressing     Patient denied the presence of any chest pain during this shift. Although non-compliant with fall risk and precautions education patient did not encounter any falls or injuries since admission. Anxiety and agitation well managed with Ativan.

## 2019-12-18 NOTE — PLAN OF CARE
Patient AAOx3  Patient states he lives in a hotel until he goes to his apartment next week    This  put name on white board and explained blue discharge folder to patient. Discharge planning brochure and/or business card given to patient.  Patient verbalized understanding.    Patient willing to go to [facility] family medicine clinic.      Patient states he is staying at the   [facility]  [address]  [place] [address]    Patient will need transportation upon discharge.         12/18/19 1122   Discharge Assessment   Assessment Type Discharge Planning Assessment   Confirmed/corrected address and phone number on facesheet? Yes   Assessment information obtained from? Patient   Communicated expected length of stay with patient/caregiver yes   Prior to hospitilization cognitive status: Alert/Oriented   Prior to hospitalization functional status: Independent   Current cognitive status: Alert/Oriented   Current Functional Status: Independent   Lives With child(tj), adult   Able to Return to Prior Arrangements yes   Is patient able to care for self after discharge? Yes   Patient's perception of discharge disposition home or selfcare   Readmission Within the Last 30 Days no previous admission in last 30 days   Patient currently being followed by outpatient case management? No   Patient currently receives any other outside agency services? No   Equipment Currently Used at Home none   Do you have any problems affording any of your prescribed medications? No   Is the patient taking medications as prescribed? yes   Does the patient have transportation home? No   Does the patient receive services at the Coumadin Clinic? No   Discharge Plan A Home   Discharge Plan B Home   DME Needed Upon Discharge  none   Patient/Family in Agreement with Plan no     Joseline Rasmussen RN, CCM, CMSRN  RN Transition Navigator  [phone]

## 2019-12-18 NOTE — SUBJECTIVE & OBJECTIVE
Past Medical History:   Diagnosis Date    Arthritis        Past Surgical History:   Procedure Laterality Date    CARDIAC SURGERY         Review of patient's allergies indicates:  No Known Allergies    No current facility-administered medications on file prior to encounter.      Current Outpatient Medications on File Prior to Encounter   Medication Sig    atorvastatin (LIPITOR) 80 MG tablet Take 80 mg by mouth once daily.    clopidogrel (PLAVIX) 75 mg tablet Take 75 mg by mouth once daily.    lisinopril (PRINIVIL,ZESTRIL) 2.5 MG tablet Take 2.5 mg by mouth once daily.    metoprolol succinate (TOPROL-XL) 25 MG 24 hr tablet Take 25 mg by mouth once daily. Take 1/2 A tablet once daily    nitroGLYCERIN (NITROSTAT) 0.3 MG SL tablet Place 0.3 mg under the tongue every 5 (five) minutes as needed for Chest pain.    warfarin (COUMADIN) 5 MG tablet Take 5 mg by mouth once daily.    HYDROcodone-acetaminophen (NORCO) 5-325 mg per tablet Take 1 tablet by mouth every 6 (six) hours as needed for Pain.    ibuprofen (ADVIL,MOTRIN) 200 MG tablet Take 200 mg by mouth every 6 (six) hours as needed for Pain.    lidocaine (LIDODERM) 5 % Place 1 patch onto the skin once daily. Remove & Discard patch within 12 hours or as directed by MD    naproxen (NAPROSYN) 500 MG tablet Take 1 tablet (500 mg total) by mouth 2 (two) times daily with meals.     Family History     None        Tobacco Use    Smoking status: Current Every Day Smoker     Packs/day: 2.00     Types: Cigarettes    Smokeless tobacco: Never Used   Substance and Sexual Activity    Alcohol use: Not Currently    Drug use: Never    Sexual activity: Not Currently     Review of Systems   Constitution: Negative for diaphoresis and malaise/fatigue.   HENT: Negative.    Eyes: Negative.    Cardiovascular: Negative for chest pain, dyspnea on exertion, irregular heartbeat, leg swelling, near-syncope, orthopnea, palpitations, paroxysmal nocturnal dyspnea and syncope.    Respiratory: Negative for cough, shortness of breath and wheezing.    Endocrine: Negative.    Hematologic/Lymphatic: Bruises/bleeds easily.   Skin: Negative.    Musculoskeletal: Positive for arthritis and neck pain.   Gastrointestinal: Negative.    Genitourinary: Negative.    Neurological: Negative.    Psychiatric/Behavioral: Positive for substance abuse.   Allergic/Immunologic: Negative.      Objective:     Vital Signs (Most Recent):  Temp: 97.8 °F (36.6 °C) (12/18/19 0816)  Pulse: 84 (12/18/19 0816)  Resp: 18 (12/18/19 0816)  BP: (!) 139/90 (12/18/19 0816)  SpO2: (!) 92 % (12/18/19 0816) Vital Signs (24h Range):  Temp:  [97.8 °F (36.6 °C)-98.4 °F (36.9 °C)] 97.8 °F (36.6 °C)  Pulse:  [68-84] 84  Resp:  [18-22] 18  SpO2:  [92 %-100 %] 92 %  BP: (110-141)/() 139/90     Weight: 70.4 kg (155 lb 3.3 oz)  Body mass index is 24.31 kg/m².    SpO2: (!) 92 %  O2 Device (Oxygen Therapy): room air      Intake/Output Summary (Last 24 hours) at 12/18/2019 0916  Last data filed at 12/18/2019 0532  Gross per 24 hour   Intake 1095.82 ml   Output 400 ml   Net 695.82 ml       Lines/Drains/Airways     Peripheral Intravenous Line                 Peripheral IV - Single Lumen 12/17/19 1900 18 G Left Antecubital less than 1 day                Physical Exam   Constitutional: He is oriented to person, place, and time. No distress.   HENT:   Head: Atraumatic.   Eyes: Right eye exhibits no discharge. Left eye exhibits no discharge.   Neck: No JVD present.   Cardiovascular: Normal rate, regular rhythm and normal heart sounds. Exam reveals no gallop and no friction rub.   No murmur heard.  Pulmonary/Chest: Effort normal and breath sounds normal.   Abdominal: Soft. Bowel sounds are normal.   Musculoskeletal: He exhibits no edema or tenderness.   Neurological: He is alert and oriented to person, place, and time.   Skin: Skin is warm and dry. He is not diaphoretic.   Psychiatric: He has a normal mood and affect.       Significant Labs:    BMP:   Recent Labs   Lab 12/17/19  1254 12/18/19  0238    107    142   K 4.0 3.6    108   CO2 25 21*   BUN 15 16   CREATININE 1.0 1.0   CALCIUM 8.7 8.8   MG  --  2.2   , CMP   Recent Labs   Lab 12/17/19  1254 12/18/19  0238    142   K 4.0 3.6    108   CO2 25 21*    107   BUN 15 16   CREATININE 1.0 1.0   CALCIUM 8.7 8.8   PROT 6.5 6.6   ALBUMIN 3.3* 3.4*   BILITOT 0.5 0.5   ALKPHOS 64 66   AST 17 16   ALT 23 23   ANIONGAP 9 13   ESTGFRAFRICA >60 >60   EGFRNONAA >60 >60   , CBC   Recent Labs   Lab 12/17/19  1254 12/17/19  1934 12/18/19  0237   WBC 7.42 8.61 8.59   HGB 13.2* 14.3 13.7*   HCT 40.4 43.9 41.3   * 344 373*   , INR   Recent Labs   Lab 12/17/19  1406   INR 1.4*   , Lipid Panel No results for input(s): CHOL, HDL, LDLCALC, TRIG, CHOLHDL in the last 48 hours., Troponin   Recent Labs   Lab 12/17/19  1254 12/17/19  1609 12/17/19  1934   TROPONINI <0.006 <0.006 <0.006    and All pertinent lab results from the last 24 hours have been reviewed.    Significant Imaging: Echocardiogram: 2D echo with color flow doppler: No results found for this or any previous visit. and Transthoracic echo (TTE) complete (Cupid Only):   Results for orders placed or performed during the hospital encounter of 12/17/19   Echo Color Flow Doppler? Yes   Result Value Ref Range    LVIDD 5.70 3.5 - 6.0 cm    IVS 0.90 0.6 - 1.1 cm    PW 0.90 0.6 - 1.1 cm    Ao root annulus 3.00 cm    LVIDS 4.30 2.1 - 4.0 cm    FS 25 28 - 44 %    LV mass 197.52 g    LA size 3.30 cm    Left Ventricle Relative Wall Thickness 0.32 cm    Narrative    · Severely decreased left ventricular systolic function. The estimated   ejection fraction is 25%  · No LV thrombus (optison)

## 2019-12-18 NOTE — PLAN OF CARE
Patient awake, alert and oriented.VVS.On tele, no ectopy on tele.  Heparin  for VTE prevention.Blood glucose monitored AC/HS and covered per sliding scale. IV antibiotics for infection. IV dressing CDI and flushes . Bed in low position and locked. Call light and personal items with in reach. Bed alarm remains on. Instructed to call for assistance, verbalized understanding. Educated on new medications.

## 2019-12-18 NOTE — PLAN OF CARE
VN reviewed discharge instructions with pt. AVS printed and handed to pt by bedside nurse. Reviewed follow-up appointments, medications, home care instructions, and importance of medication compliance. Allowed time for questions. All questions answered. Patient verbalized complete understanding of discharge instructions and voices no concerns.    Discharge instructions complete. Pt waiting on transportation. Bedside nurse notified.

## 2019-12-18 NOTE — ASSESSMENT & PLAN NOTE
Patient with longstanding history of cocaine, marijuana, and heroin abuse  Drug screen pending  Cessation encouraged

## 2019-12-18 NOTE — PLAN OF CARE
Future Appointments   Date Time Provider Department Center   1/8/2020  8:40 AM [facility]           Discharge rounds on patient. Discussed followup appointments, blue discharge folder, discharge nurse will go over home medications and reasons for medications and final discharge instructions. All patient/caregiver questions answered. Patient verbalized understanding.      Placed patient flow center request for transportation for 3:30pm  as patient lives at the [facility] in [address]. [place] [address]     12/18/19 6617   Final Note   Assessment Type Final Discharge Note   Anticipated Discharge Disposition Home   Hospital Follow Up  Appt(s) scheduled? Yes   Discharge plans and expectations educations in teach back method with documentation complete? Yes   Right Care Referral Info   Post Acute Recommendation No Care     Joseline Rasmussen, RN, CCM, CMSRN  RN Transition Navigator  [phone]

## 2019-12-18 NOTE — ED NOTES
Lab called for PT INR redraw   
Patient became upset bc he wanted to go to the restroom to have a bowel movement, pt states he can not have a bowel movement on a commode. Commode juanita   
Pt is yelling out, requesting pain medication for his headache and neck pain.   
Pt request to leave AMA, pt states tylenol will not help him the spasms he's having in neck is causing is chest pain. PT wanted to speak with  Notified that patient refused medication and EKG. Patient states EKG will not stop his neck pain.   
21-Apr-2017 09:52

## 2019-12-18 NOTE — NURSING
"VN cued into room to complete admit assessment, VIP model introduced, VN working alongside bedside treatment team.  Plan of care reviewed with patient. Patient verbalized understanding. Patient informed of fall risk and fall precautions, call light within reach, 2x bed rails. Patient notified to ask staff for assistance and pt verbalized complete understanding. Time allowed for questions. Patient is very anxious and having "spasms" requesting that a nicotine patch be ordered and something for anxiety. Also notified MD that patient's weight is different from dosing weight for Heparin gtt. Awaiting orders.  Heparin drip re ordered based on current weight .Will continue to monitor and intervene as needed.    "

## 2019-12-18 NOTE — PLAN OF CARE
VN rounds: VN cued into pt's room with pt's permission. Pt resting in bed. Fall risk protocol discussed with pt. VN instructed pt to call for assistance. Pt aware and agreeable. Allowed time for questions. Pt c/o chest pain 7/10 and states his nurse is aware. STAT trop and EKG ordered. Instructed pt to notify if pain gets worse. Pt verbalized understanding. Will cont to be available and intervene as needed.        12/18/19 1038   Type of Frequent Check   Type Patient Rounds;Other (see comments)  (VN rounds)   Safety/Activity   Patient Rounds visualized patient;call light in patient/parent reach   Safety Promotion/Fall Prevention assistive device/personal item within reach;Fall Risk reviewed with patient/family;instructed to call staff for mobility   Positioning   Body Position side-lying, left   Pain/Comfort/Sleep   Preferred Pain Scale number (Numeric Rating Pain Scale)   Pain Body Location chest   Pain Rating (0-10): Rest 7   Sleep/Rest/Relaxation awake   Assessments (Pre/Post)   Level of Consciousness (AVPU) alert

## 2019-12-18 NOTE — ASSESSMENT & PLAN NOTE
INR 1.4  Thrombus not visualized on repeat TTE, he will need to OAC as an outpatient and follow up with primary cardiologist upon discharge  Resume Coumadin  Bridge with Lovenox 1mg/kg BID  Appropriate for discharge from CV perspective

## 2019-12-18 NOTE — ASSESSMENT & PLAN NOTE
LVEF 20-25% per outside records with persistent LV thrombus with medical noncompliance  Repeat TTE 12/18/2019  · Severely decreased left ventricular systolic function. The estimated ejection fraction is 25%  · No LV thrombus (optison)  Patient is euvolemic on exam  Continue GDMT

## 2019-12-18 NOTE — PROGRESS NOTES
"[facility] Medicine Progress Note    Primary Team: [facility] Hospitalist Team B  Attending Physician: Mikal West MD  Resident: Elvin Mosquera MD  Intern: Mark Moyer Jr., MD    Subjective:    Patient stated that he did well overnight. He denied experiencing fevers, chills, nausea, vomiting, chest pain, cough, SOB, and abdominal pain overnight.      Objective:     Last 24 Hour Vital Signs:  BP  Min: 110/74  Max: 141/101  Temp  Av.2 °F (36.8 °C)  Min: 97.9 °F (36.6 °C)  Max: 98.4 °F (36.9 °C)  Pulse  Av.9  Min: 68  Max: 82  Resp  Av.5  Min: 18  Max: 22  SpO2  Av.8 %  Min: 98 %  Max: 100 %  Height  Av' 7" (170.2 cm)  Min: 5' 7" (170.2 cm)  Max: 5' 7" (170.2 cm)  Weight  Av kg (147 lb 9.6 oz)  Min: 63.5 kg (140 lb)  Max: 70.4 kg (155 lb 3.3 oz)  No intake/output data recorded.    Physical Examination:  -General: lying in bed asleep and resting comfortably and in NAD  -Head: NC; AT; no obvious abnormalities   -Eyes: PERRLA; EOMI; no scleral icterus   -Mouth: MMM; no oropharyngeal erythema   -Cardiovascular: RRR; S1 and S2 auscultated; no murmurs appreciated   -Pulmonary: normal work of breathing; lungs CTAB  -Abdominal: soft; ND; normal bowel sounds auscultated; no rebound, tenderness, or guarding on palpation  -Extremities: moves extremities against gravity and resistance   -Skin: no rash, redness, erythema observed; multiple tattoos over body  -Neurological: AAOx3; 5/5 strength of bilateral UEs and LEs     Laboratory:  Laboratory Data Reviewed: yes  Pertinent Findings:  CBC - H&H  Were 13.7 and 41.3, Platelets 373, WBC - 8.59;   CMP - all values wnl except for albumin 3.4 (improved) and bicarbonate 21  Magnesium 2.2 and Phosphorus 3.3     Microbiology Data Reviewed: yes  Pertinent Findings:  None    Other Results:  EKG (my interpretation): Normal sinus rhythm; Rate 75 bpm; Axis - 60 degrees; septal infarct indicating previous of indeterminant age    Radiology Data Reviewed: " yes  Pertinent Findings:  None    Current Medications:     Infusions:   heparin (porcine) in D5W 14 Units/kg/hr (12/18/19 0532)        Scheduled:   acetaminophen  650 mg Oral Once    atorvastatin  80 mg Oral QHS    clopidogrel  75 mg Oral Daily    lisinopril  2.5 mg Oral Daily    metoprolol succinate  25 mg Oral Daily    perflutren protein-a microsphr  0.5 mL Intravenous Once        PRN:  Dextrose 10% Bolus, Dextrose 10% Bolus, glucagon (human recombinant), glucose, glucose, heparin (PORCINE), heparin (PORCINE), influenza, melatonin, ondansetron, pneumoc 13-mckinley conj-dip cr(PF), sodium chloride 0.9%    Antibiotics and Day Number of Therapy:  None    Lines and Day Number of Therapy:  PIV, inserted 12/17/19    Assessment:     Ganesh Estrada is a 44 y.o.male with  Patient Active Problem List    Diagnosis Date Noted    Other chest pain 12/17/2019    Substance abuse 12/17/2019    Coronary artery disease involving native coronary artery of native heart with angina pectoris 12/17/2019    Mural thrombus of cardiac apex 12/17/2019    Chronic systolic heart failure 12/17/2019    Chest pain 12/17/2019        Plan:     Chest Pain    -reported one day history fo chest pain with activity  -received  mg while in ED  -received NG 0.4 mg for one dose in ED  -Troponin trended x3; all 3 values negative at <0.006  -Placed on telemetry  -On continuous Heparin drip  -Inpatient consult placed to [facility]  -Reported no longer experiencing chest pain on 12/18/19     HFrEF, Systolic Dysfunction  -TTE performed 12/17/19 - showed LV EF of 25%     History of Mural Thrombus of Cardiac Juliette  -Reported history of blood clot present behind his heart that is size of US quarter  -Received  mg while in ED  -On continuous Heparin drip     CAD s/p stent placement x 2  -Reported history of recent MI's 4 months ago and 2 weeks ago with stents placed in aftermath   -On continuous Heparin drip  -Home medications include  ASA 81 mg atorvastatin 80 mg, Toprol-XL 25 mg, Lisinopril 2.5 mg, and warfarin 5 mg daily; Nitroglycerin 0.3 mg PRN  -Restarted most home meds; holding warfarin d/t INR being subtherapeutic       Polysubstance Abuse  -Reported tobacco use as 1 ppd for 37 years  -Reported marijuana use as recently as 5 days ago  -Reported one time previosuly using heroin  -Reported that he took a whole bottle of melatonin pills for sleep aid  -Administered one dose Ativan 1 mg IV for anxiety; suspect that pt was exhibiting drug-seeking behavior  -Plan to not administer any additional Ativan doses; Plan to hold opiates     Low Albumin  -Albumin slightly lower than normal at 3.3 on CMP  -Encourage PO intake   -Slight increase to 3.4  On 12/18    Health Care Maintenance   -Ordered Flu vaccine for one dose        Fluids: not receiving  Electrolytes: Daily CMP, Mg and Phos ordered  Nutrition: Cardiac  Code: Full  DVT PPX: Continuous Heparin      Dispo: [facility]; possible discharge tomorrow with resolution of chest pain in setting of 3 negative troponin labs    Mark Moyer MD  [facility] Internal Medicine HO-1    [facility] Medicine Hospitalist Pager numbers:   [facility] Hospitalist Medicine Team A (Neetu/Amos): [phone]  [facility] Hospitalist Medicine Team B (Manny/Brett):  [phone]

## 2019-12-18 NOTE — NURSING
Patient arrived to the floor via stretcher per transport and nurse from ER. Patient ambulated safely from stretcher to bed without assist of staff. Bed locked in lowest position. Patient denies chest pain. Oriented to room, staff, and floor, all teaching will need reinforcement d/t patient's anxiety. Call light in reach, side rails up x 2. Tele monitor in correct placement and functioning, bed alarm set, Heparin drip infusing at initial rate. Will continue to monitor.

## 2019-12-18 NOTE — PROGRESS NOTES
[facility]  Cardiology  Progress Note    Patient Name: Ganesh Estrada  MRN: [id number]  Admission Date: 12/17/2019  Hospital Length of Stay: 0 days  Code Status: Full Code   Attending Physician: Mikal West MD   Primary Care Physician: Primary Doctor No  Expected Discharge Date:   Principal Problem:<principal problem not specified>    Subjective:     Hospital Course:   12/17/2019 Per HPI  12/18/2019 Serial troponin and EKG negative.   TTE:  · Severely decreased left ventricular systolic function. The estimated ejection fraction is 25%  · No LV thrombus (optison)    INR subtherapeutic at 1.4. Hemodynamically stable.     Past Medical History:   Diagnosis Date    Arthritis        Past Surgical History:   Procedure Laterality Date    CARDIAC SURGERY         Review of patient's allergies indicates:  No Known Allergies    No current facility-administered medications on file prior to encounter.      Current Outpatient Medications on File Prior to Encounter   Medication Sig    atorvastatin (LIPITOR) 80 MG tablet Take 80 mg by mouth once daily.    clopidogrel (PLAVIX) 75 mg tablet Take 75 mg by mouth once daily.    lisinopril (PRINIVIL,ZESTRIL) 2.5 MG tablet Take 2.5 mg by mouth once daily.    metoprolol succinate (TOPROL-XL) 25 MG 24 hr tablet Take 25 mg by mouth once daily. Take 1/2 A tablet once daily    nitroGLYCERIN (NITROSTAT) 0.3 MG SL tablet Place 0.3 mg under the tongue every 5 (five) minutes as needed for Chest pain.    warfarin (COUMADIN) 5 MG tablet Take 5 mg by mouth once daily.    HYDROcodone-acetaminophen (NORCO) 5-325 mg per tablet Take 1 tablet by mouth every 6 (six) hours as needed for Pain.    ibuprofen (ADVIL,MOTRIN) 200 MG tablet Take 200 mg by mouth every 6 (six) hours as needed for Pain.    lidocaine (LIDODERM) 5 % Place 1 patch onto the skin once daily. Remove & Discard patch within 12 hours or as directed by MD    naproxen (NAPROSYN) 500 MG tablet Take 1 tablet (500  mg total) by mouth 2 (two) times daily with meals.     Family History     None        Tobacco Use    Smoking status: Current Every Day Smoker     Packs/day: 2.00     Types: Cigarettes    Smokeless tobacco: Never Used   Substance and Sexual Activity    Alcohol use: Not Currently    Drug use: Never    Sexual activity: Not Currently     Review of Systems   Constitution: Negative for diaphoresis and malaise/fatigue.   HENT: Negative.    Eyes: Negative.    Cardiovascular: Negative for chest pain, dyspnea on exertion, irregular heartbeat, leg swelling, near-syncope, orthopnea, palpitations, paroxysmal nocturnal dyspnea and syncope.   Respiratory: Negative for cough, shortness of breath and wheezing.    Endocrine: Negative.    Hematologic/Lymphatic: Bruises/bleeds easily.   Skin: Negative.    Musculoskeletal: Positive for arthritis and neck pain.   Gastrointestinal: Negative.    Genitourinary: Negative.    Neurological: Negative.    Psychiatric/Behavioral: Positive for substance abuse.   Allergic/Immunologic: Negative.      Objective:     Vital Signs (Most Recent):  Temp: 97.8 °F (36.6 °C) (12/18/19 0816)  Pulse: 84 (12/18/19 0816)  Resp: 18 (12/18/19 0816)  BP: (!) 139/90 (12/18/19 0816)  SpO2: (!) 92 % (12/18/19 0816) Vital Signs (24h Range):  Temp:  [97.8 °F (36.6 °C)-98.4 °F (36.9 °C)] 97.8 °F (36.6 °C)  Pulse:  [68-84] 84  Resp:  [18-22] 18  SpO2:  [92 %-100 %] 92 %  BP: (110-141)/() 139/90     Weight: 70.4 kg (155 lb 3.3 oz)  Body mass index is 24.31 kg/m².    SpO2: (!) 92 %  O2 Device (Oxygen Therapy): room air      Intake/Output Summary (Last 24 hours) at 12/18/2019 0916  Last data filed at 12/18/2019 0532  Gross per 24 hour   Intake 1095.82 ml   Output 400 ml   Net 695.82 ml       Lines/Drains/Airways     Peripheral Intravenous Line                 Peripheral IV - Single Lumen 12/17/19 1900 18 G Left Antecubital less than 1 day                Physical Exam   Constitutional: He is oriented to person,  place, and time. No distress.   HENT:   Head: Atraumatic.   Eyes: Right eye exhibits no discharge. Left eye exhibits no discharge.   Neck: No JVD present.   Cardiovascular: Normal rate, regular rhythm and normal heart sounds. Exam reveals no gallop and no friction rub.   No murmur heard.  Pulmonary/Chest: Effort normal and breath sounds normal.   Abdominal: Soft. Bowel sounds are normal.   Musculoskeletal: He exhibits no edema or tenderness.   Neurological: He is alert and oriented to person, place, and time.   Skin: Skin is warm and dry. He is not diaphoretic.   Psychiatric: He has a normal mood and affect.       Significant Labs:   BMP:   Recent Labs   Lab 12/17/19  1254 12/18/19  0238    107    142   K 4.0 3.6    108   CO2 25 21*   BUN 15 16   CREATININE 1.0 1.0   CALCIUM 8.7 8.8   MG  --  2.2   , CMP   Recent Labs   Lab 12/17/19  1254 12/18/19  0238    142   K 4.0 3.6    108   CO2 25 21*    107   BUN 15 16   CREATININE 1.0 1.0   CALCIUM 8.7 8.8   PROT 6.5 6.6   ALBUMIN 3.3* 3.4*   BILITOT 0.5 0.5   ALKPHOS 64 66   AST 17 16   ALT 23 23   ANIONGAP 9 13   ESTGFRAFRICA >60 >60   EGFRNONAA >60 >60   , CBC   Recent Labs   Lab 12/17/19  1254 12/17/19  1934 12/18/19  0237   WBC 7.42 8.61 8.59   HGB 13.2* 14.3 13.7*   HCT 40.4 43.9 41.3   * 344 373*   , INR   Recent Labs   Lab 12/17/19  1406   INR 1.4*   , Lipid Panel No results for input(s): CHOL, HDL, LDLCALC, TRIG, CHOLHDL in the last 48 hours., Troponin   Recent Labs   Lab 12/17/19  1254 12/17/19  1609 12/17/19  1934   TROPONINI <0.006 <0.006 <0.006    and All pertinent lab results from the last 24 hours have been reviewed.    Significant Imaging: Echocardiogram: 2D echo with color flow doppler: No results found for this or any previous visit. and Transthoracic echo (TTE) complete (Cupid Only):   Results for orders placed or performed during the hospital encounter of 12/17/19   Echo Color Flow Doppler? Yes   Result Value  Ref Range    LVIDD 5.70 3.5 - 6.0 cm    IVS 0.90 0.6 - 1.1 cm    PW 0.90 0.6 - 1.1 cm    Ao root annulus 3.00 cm    LVIDS 4.30 2.1 - 4.0 cm    FS 25 28 - 44 %    LV mass 197.52 g    LA size 3.30 cm    Left Ventricle Relative Wall Thickness 0.32 cm    Narrative    · Severely decreased left ventricular systolic function. The estimated   ejection fraction is 25%  · No LV thrombus (optison)        Assessment and Plan:     Brief HPI: Patient seen this morning on rounds without reports of chest pain. Notes fatigue. POC discussed with patient including compliance with medications.     Chest pain  Felt to be noncardiac in etiology  Troponin and EKGs negative for any acute ischemic changes      Chronic systolic heart failure  LVEF 20-25% per outside records with persistent LV thrombus with medical noncompliance  Repeat TTE 12/18/2019  · Severely decreased left ventricular systolic function. The estimated ejection fraction is 25%  · No LV thrombus (optison)  Patient is euvolemic on exam  Continue GDMT    Mural thrombus of cardiac apex  INR 1.4  Thrombus not visualized on repeat TTE, he will need to OAC as an outpatient and follow up with primary cardiologist upon discharge  Resume Coumadin  Bridge with Lovenox 1mg/kg BID  Appropriate for discharge from CV perspective     Coronary artery disease involving native coronary artery of native heart with angina pectoris  Reports history of CAD s/p stent (5/2019 at [facility]), then 12/2019 at [facility]  Reports compliance with medications including Plavix and Coumadin, not on asa due to risk of bleeding with triple therapy  Continue statin, BB, ACEI    EKG is without acute ischemic changes  Serial Troponin < 0.006  No indication for further ischemic evaluation at this time       Substance abuse  Patient with longstanding history of cocaine, marijuana, and heroin abuse  Drug screen pending  Cessation encouraged        VTE Risk Mitigation (From admission, onward)         Ordered     heparin  25,000 units in dextrose 5% 250 mL (100 units/mL) infusion LOW INTENSITY nomogram - [facility]  Continuous     Question:  Heparin Infusion Adjustment (DO NOT MODIFY ANSWER)  Answer:  \\[email or web address]\[email or web address]\Images\Pharmacy\HeparinInfusions\heparin LOW INTENSITY nomogram for [facility] [id number]    12/18/19 0049     heparin 25,000 units in dextrose 5% (100 units/ml) IV bolus from bag - ADDITIONAL PRN BOLUS - 30 units/kg (max bolus 4000 units)  As needed (PRN)     Question:  Heparin Infusion Adjustment (DO NOT MODIFY ANSWER)  Answer:  \[email or web address]\Images\Pharmacy\HeparinInfusions\heparin LOW INTENSITY nomogram for [facility] [id number]    12/17/19 1740     heparin 25,000 units in dextrose 5% (100 units/ml) IV bolus from bag - ADDITIONAL PRN BOLUS - 60 units/kg (max bolus 4000 units)  As needed (PRN)     Question:  Heparin Infusion Adjustment (DO NOT MODIFY ANSWER)  Answer:  \\[email or web address]\epic\Images\Pharmacy\HeparinInfusions\heparin LOW INTENSITY nomogram for [facility] [id number]    12/17/19 1740     IP VTE HIGH RISK PATIENT  Once      12/17/19 1740                Jacob Guzman NP  Cardiology  [facility]

## 2019-12-18 NOTE — H&P
"[facility] Medicine H&P Note     Admitting Team: [facility] Hospitalist Team B  Attending Physician: Mikal West MD  Resident: Elvin Mosquera MD  Intern: Mark Moyer Jr., MD     Date of Admit: 12/17/2019    Chief Complaint     Chest Pain (EMS reports that pt started having chest pain this morning. Denies N/V. EMS was unable to get 12 lead. Pt reports shortness of breath. )   for one day    Subjective:      History of Present Illness:  Ganesh Estrada is a 44 y.o.  male who  has a past medical history of Arthritis.. The patient presented to [facility] on 12/17/2019 with a primary complaint of Chest Pain (EMS reports that pt started having chest pain this morning. Denies N/V. EMS was unable to get 12 lead. Pt reports shortness of breath. )    The patient was in their usual state of health (performs ADLs independently) until approximately 4:30AM on day of ED presentation. He reported that he got up atb that time to go use the restroom and noticed neck pain and heavy breathing. After this, he went and layed back down to go to sleep. He stated that he awoke around 6:30 and continued to experience heavy breathing. He stated that while walking to [employer], he experienced a sharp pain over his left chest that brought him to his knees and caused him "to go out for 4-5 seconds." He denies hitting his head or having loss of bladder/bowel functions. Pt stated that after getting back to his feet, he felt dizzy. He stated that an ambulance arrived a few minutes later.     For his chest pain, he localized it to his left chest over his heart, described the duration as persistent and ongoing at time fo interview, characterized the pain as feeling like he is "being poked with a needle." He endorsed his neck pain and coughing as factors that exacerbate the chest pain and endorsed lying still as an alleviating factor. He endorsed pain radiation into his left jaw and elft shoulder and endorsed pain " severity as 9/10 for his neck and 7/10 for his chest. He endosred associated manifestations of nausea and dizziness, but denied sweating and vomiting as manifestations.,     Past Medical History:  Past Medical History:   Diagnosis Date    Arthritis        Past Surgical History:  Past Surgical History:   Procedure Laterality Date    CARDIAC SURGERY       Allergies:  Review of patient's allergies indicates:  No Known Allergies    Home Medications:  Prior to Admission medications    Medication Sig Start Date End Date Taking? Authorizing Provider   atorvastatin (LIPITOR) 80 MG tablet Take 80 mg by mouth once daily.   Yes Historical Provider, MD   clopidogrel (PLAVIX) 75 mg tablet Take 75 mg by mouth once daily.   Yes Historical Provider, MD   lisinopril (PRINIVIL,ZESTRIL) 2.5 MG tablet Take 2.5 mg by mouth once daily.   Yes Historical Provider, MD   metoprolol succinate (TOPROL-XL) 25 MG 24 hr tablet Take 25 mg by mouth once daily. Take 1/2 A tablet once daily   Yes Historical Provider, MD   nitroGLYCERIN (NITROSTAT) 0.3 MG SL tablet Place 0.3 mg under the tongue every 5 (five) minutes as needed for Chest pain.   Yes Historical Provider, MD   warfarin (COUMADIN) 5 MG tablet Take 5 mg by mouth once daily.   Yes Historical Provider, MD   HYDROcodone-acetaminophen (NORCO) 5-325 mg per tablet Take 1 tablet by mouth every 6 (six) hours as needed for Pain.    Historical Provider, MD   ibuprofen (ADVIL,MOTRIN) 200 MG tablet Take 200 mg by mouth every 6 (six) hours as needed for Pain.    Historical Provider, MD   lidocaine (LIDODERM) 5 % Place 1 patch onto the skin once daily. Remove & Discard patch within 12 hours or as directed by MD 2/27/19   Memo Preston PA-C   naproxen (NAPROSYN) 500 MG tablet Take 1 tablet (500 mg total) by mouth 2 (two) times daily with meals. 2/27/19   Memo Preston PA-C     Family History:  History reviewed. No pertinent family history.    Social History:  Social History     Tobacco Use     Smoking status: Current Every Day Smoker     Packs/day: 2.00     Types: Cigarettes    Smokeless tobacco: Never Used   Substance Use Topics    Alcohol use: Not Currently    Drug use: Never       Review of Systems:  All other systems are reviewed and are negative.    Health Maintaince :   Primary Care Physician: No PCP    Immunizations:   TDap UTD    Flu UTD   Pna Not UTD     Cancer Screening:  Colonoscopy: not indicated      Objective:   Last 24 Hour Vital Signs:  BP  Min: 119/72  Max: 141/101  Temp  Av.3 °F (36.8 °C)  Min: 98.2 °F (36.8 °C)  Max: 98.4 °F (36.9 °C)  Pulse  Av.8  Min: 68  Max: 74  Resp  Av  Min: 18  Max: 22  SpO2  Av.8 %  Min: 98 %  Max: 100 %  Weight  Av.5 kg (140 lb)  Min: 63.5 kg (140 lb)  Max: 63.5 kg (140 lb)  Body mass index is 21.93 kg/m².  No intake/output data recorded.    Physical Examination:  -General: alert and oriented; at times during interview appears to be in midl distress with worried voice tone, but at other times appears to be in no distress with patient laughing and conversing playfully with phlebotomist   -Head: NC; AT; no obvious abnormalities   -Eyes: PERRLA; EOMI; no scleral icterus   -Mouth: MMM; no oropharyngeal erythema   -Cardiovascular: RRR; S1 and S2 auscultated; no murmurs appreciated   -Pulmonary: normal work of breathing; lungs CTAB  -Abdominal: soft; ND; normal bowel sounds auscultated; no rebound, tenderness, or guarding on palpation  -Extremities: moves extremities against gravity and resistance   -Skin: no rash, redness, erythema observed; multiple tattoos over body  -Neurological: AAOx3; 5/5 strength of bilateral UEs and LEs    Laboratory:  Most Recent Data:  CBC:   Lab Results   Component Value Date    WBC 8.61 2019    HGB 14.3 2019    HCT 43.9 2019     2019    MCV 94 2019    RDW 13.4 2019     WBC Differential: 76.9 % N, 16.8 % L, 4.6 % M, 1.6 % Eo, 0.1 % Baso,   BMP:   Lab Results    Component Value Date     12/17/2019    K 4.0 12/17/2019     12/17/2019    CO2 25 12/17/2019    BUN 15 12/17/2019    CREATININE 1.0 12/17/2019     12/17/2019    CALCIUM 8.7 12/17/2019     LFTs:   Lab Results   Component Value Date    PROT 6.5 12/17/2019    ALBUMIN 3.3 (L) 12/17/2019    BILITOT 0.5 12/17/2019    AST 17 12/17/2019    ALKPHOS 64 12/17/2019    ALT 23 12/17/2019     Coags:   Lab Results   Component Value Date    INR 1.4 (H) 12/17/2019     FLP: No results found for: CHOL, HDL, LDLCALC, TRIG, CHOLHDL  DM:   Lab Results   Component Value Date    CREATININE 1.0 12/17/2019     Thyroid: No results found for: TSH, FREET4, B6CWAGF, Q1ZGADV, THYROIDAB  Anemia: No results found for: IRON, TIBC, FERRITIN, [id number], FOLATE  Cardiac:   Lab Results   Component Value Date    TROPONINI <0.006 12/17/2019     (H) 12/17/2019     Urinalysis: No results found for: LABURIN, COLORU, CLARITYU, SPECGRAV, LABSPEC, NITRITE, PROTEINUR, GLUCOSEU, KETONESU, UROBILINOGEN, BILIRUBINUR, BLOODU, RBCU, WBCUA    Trended Lab Data:  Recent Labs   Lab 12/17/19  1254 12/17/19  1934   WBC 7.42 8.61   HGB 13.2* 14.3   HCT 40.4 43.9   * 344   MCV 94 94   RDW 13.4 13.4     --    K 4.0  --      --    CO2 25  --    BUN 15  --    CREATININE 1.0  --      --    PROT 6.5  --    ALBUMIN 3.3*  --    BILITOT 0.5  --    AST 17  --    ALKPHOS 64  --    ALT 23  --        Trended Cardiac Data:  Recent Labs   Lab 12/17/19  1254 12/17/19  1609 12/17/19  1934   TROPONINI <0.006 <0.006 <0.006   *  --   --        Microbiology Data:  None    Other Results:  EKG (my interpretation): Normal sinus rhythm; Rate 75 bpm; Axis - 60 degrees; septal infarct indicating previous of indeterminant age    Radiology:  Imaging Results    None        Assessment:     Ganesh Estrada is a 44 y.o. male with:  Patient Active Problem List    Diagnosis Date Noted    Other chest pain 12/17/2019    Substance abuse 12/17/2019     Coronary artery disease involving native coronary artery of native heart with angina pectoris 12/17/2019    Mural thrombus of cardiac apex 12/17/2019    Chronic systolic heart failure 12/17/2019    Chest pain 12/17/2019        Plan:     Chest Pain    -reported one day history fo chest pain with activity  -received  mg while in ED  -received NG 0.4 mg for one dose in ED  -Troponin trended x3; all 3 values negative at <0.006  -Placed on telemetry  -On continuous Heparin drip  -Inpatient consult placed to [facility]    HFrEF, Systolic Dysfunction  -TTE performed 12/17/19 - showed LV EF of 25%    History of Mural Thrombus of Cardiac [place]  -Reported history of blood clot present behind his heart that is size of US quarter  -Received  mg while in ED  -On continuous Heparin drip    CAD s/p stent placement x 2  -Reported history of recent MI's 4 months ago and 2 weeks ago with stents placed in aftermath   -On continuous Heparin drip  -Home medications include ASA 81 mg atorvastatin 80 mg, Toprol-XL 25 mg, Lisinopril 2.5 mg, and warfarin 5 mg daily; Nitroglycerin 0.3 mg PRN  -Restarted most home meds; holding warfarin d/t INR being subtherapeutic      Polysubstance Abuse  -Reported tobacco use as 1 ppd for 37 years  -Reported marijuana use as recently as 5 days ago  -Reported one time previosuly using heroin  -Reported that he took a whole bottle of melatonin pills for sleep aid  -Administered one dose Ativan 1 mg IV for anxiety; suspect that pt was exhibiting drug-seeking behavior  -Plan to not administer any additional Ativan doses; Plan to hold opiates    Low Albumin  -Albumin slightly lower than normal at 3.3 on CMP  -Encourage PO intake     Health Care Maintenance   -Ordered Flu vaccine for one dose      Fluids: not receiving  Electrolytes: Daily CMP, Mg and Phos ordered  Nutrition: Cardiac  Code: Full  DVT PPX: Continuous Heparin     Dispo: Follow-up [facility] recommendations; possible  discharge tomorrow with resolution of chest pain in setting of 3 negative troponin labs    Code Status:     Full    Mark Moyer Jr., MD  [facility] Internal Medicine HO-1    [facility] Medicine Hospitalist Pager numbers:   [facility] Hospitalist Medicine Team A (Neetu/Amos): [phone]  [facility] Hospitalist Medicine Team B (Manny/Brett):  [phone]

## 2019-12-18 NOTE — NURSING
Spoke with lab this A.M. Discussed that CBC and Chem Profile were drawn this morning with the aPTT draw and that there is no need to re-draw them as ordered for 0600. Will order a new aPTT draw for 6 hours from rate change.

## 2019-12-19 LAB
AMPHETAMINES SERPL QL: NEGATIVE
BARBITURATES SERPL QL SCN: NEGATIVE
BENZODIAZ SERPL QL SCN: NEGATIVE
BZE SERPL QL: NEGATIVE
CARBOXYTHC SERPL QL SCN: NEGATIVE
ETHANOL SERPL QL SCN: NEGATIVE
METHADONE SERPL QL SCN: NEGATIVE
OPIATES SERPL QL SCN: NEGATIVE
PCP SERPL QL SCN: NEGATIVE
PROPOXYPH SERPL QL: NEGATIVE

## 2019-12-19 NOTE — DISCHARGE SUMMARY
[facility] Internal [facility] Hospitalist Service  Discharge Summary     Primary Team: [facility] Team B  Attending Physician: NEIL Ryder MD  Resident: RYLEE Mosquera MD   Intern: KOURTNEY Moyer MD     Date of Admit: 12/17/2019  Date of Discharge: 12/19/2019    Discharge to: home, self care   Condition: stable, improved     Discharge Diagnoses     Patient Active Problem List   Diagnosis    Other chest pain    Substance abuse    Coronary artery disease involving native coronary artery of native heart with angina pectoris    Mural thrombus of cardiac apex    Chronic systolic heart failure    Non-cardiac chest pain       Consultants, Procedures, and Significant Studies     Consultants:  [facility]   Social Work- establish PCP and Coumadin clinic     Procedures:   12/17/19 TTE:  · Severely decreased left ventricular systolic function. The estimated ejection fraction is 25%  · No LV thrombus (optison)    Brief History of Present Illness      Ganesh Estrada is a 44 y.o.  male with a past medical history of HFrEF with ventricular thrombus and polysubstance abuse who presented to [facility] on 12/17/2019 with a primary complaint of L-sided chest pain x 1 day.     For the full HPI please refer to the History & Physical from this admission.    Hospital Course By Problem with Pertinent Findings     Non-Cardiac Chest Pain     -Troponin trended x3; all 3 values negative at <0.006  -ASA load + hep gtt started on presentation, since discontinued   -evaluated by [facility]; with negative trended troponins and no signs of active ischemia/infarction on EKGs, felt not likely cardiac in nature. Will follow-up with Cardiology      HFrEF, Systolic Dysfunction, CAD s/p stent x2  -TTE performed 12/17/19 - showed LV EF of 25%  -Reported history of recent MI's 4 months ago and 2 weeks ago with stents placed in afterward, however unable to locate those records and none in Care  Everywhere   -continued GDMT (except ASA as below) and plavix   -will follow-up with Cardiology      History of Mural Thrombus of Cardiac [place], on chronic anticoagulation with Subtherapeutic INR   -Reported history of thrombus, for which he reports taking Warfarin   -TTE this admission showed no evidence of LV thrombus   -INR 1.4 on presentation; suspect noncompliance with home Warfarin   -discussed with [facility]; per recommendations, continued Warfarin (x 3 months) at prescribed dosing and made referral for Coumadin clinic for further monitoring. Goal INR 2-3.   -would resume ASA after coumadin completed for 3 months     Polysubstance Abuse  -drugs of abuse screen pending on discharge   -counseled on cessation       Health Care Maintenance   -Ordered Flu vaccine for one dose  -encouraged to establish PCP on discharge     Discharge Medications        Medication List      START taking these medications    aspirin 81 MG EC tablet  Commonly known as:  ECOTRIN  Take 1 tablet (81 mg total) by mouth once daily.        CONTINUE taking these medications    HYDROcodone-acetaminophen 5-325 mg per tablet  Commonly known as:  NORCO     lidocaine 5 %  Commonly known as:  Lidoderm  Place 1 patch onto the skin once daily. Remove & Discard patch within 12 hours or as directed by MD        STOP taking these medications    ibuprofen 200 MG tablet  Commonly known as:  ADVIL,MOTRIN     naproxen 500 MG tablet  Commonly known as:  NAPROSYN        ASK your doctor about these medications    atorvastatin 80 MG tablet  Commonly known as:  LIPITOR     clopidogrel 75 mg tablet  Commonly known as:  PLAVIX     lisinopril 2.5 MG tablet  Commonly known as:  PRINIVIL,ZESTRIL     metoprolol succinate 25 MG 24 hr tablet  Commonly known as:  TOPROL-XL     nitroGLYCERIN 0.3 MG SL tablet  Commonly known as:  NITROSTAT     warfarin 5 MG tablet  Commonly known as:  COUMADIN           Where to Get Your Medications      These medications were sent  to [facility]  [id number] [address]    Hours:  Mon-Fri, 8a-5:30p Phone:  [phone]   · aspirin 81 MG EC tablet         Discharge Information:   Diet:  Cardiac     Physical Activity:  As tolerated     Follow-Up Appointments:  Future Appointments   Date Time Provider Department Center   1/8/2020  8:40 AM Elvin Kinsey PA-C [facility]     Harjit Sanchez MD  [facility] Internal Medicine & Pediatrics, \[facility] [place] [facility]\""

## 2019-12-20 ENCOUNTER — HOSPITAL ENCOUNTER (EMERGENCY)
Facility: HOSPITAL | Age: 44
Discharge: HOME | End: 2019-12-20
Attending: EMERGENCY MEDICINE
Payer: MEDICAID

## 2019-12-20 VITALS
HEIGHT: 66 IN | DIASTOLIC BLOOD PRESSURE: 93 MMHG | RESPIRATION RATE: 12 BRPM | HEART RATE: 82 BPM | SYSTOLIC BLOOD PRESSURE: 120 MMHG | WEIGHT: 144 LBS | OXYGEN SATURATION: 100 % | TEMPERATURE: 98 F | BODY MASS INDEX: 23.14 KG/M2

## 2019-12-20 DIAGNOSIS — M54.2 CHRONIC NECK PAIN: Primary | ICD-10-CM

## 2019-12-20 DIAGNOSIS — G89.29 CHRONIC NECK PAIN: Primary | ICD-10-CM

## 2019-12-20 DIAGNOSIS — M54.2 NECK PAIN: ICD-10-CM

## 2019-12-20 PROCEDURE — 25000003 PHARM REV CODE 250: Performed by: NURSE PRACTITIONER

## 2019-12-20 PROCEDURE — 99283 EMERGENCY DEPT VISIT LOW MDM: CPT | Mod: 25

## 2019-12-20 RX ORDER — METHOCARBAMOL 750 MG/1
750 TABLET, FILM COATED ORAL
Status: COMPLETED | OUTPATIENT
Start: 2019-12-20 | End: 2019-12-20

## 2019-12-20 RX ORDER — METHOCARBAMOL 500 MG/1
500 TABLET, FILM COATED ORAL 3 TIMES DAILY
Qty: 30 TABLET | Refills: 0 | Status: SHIPPED | OUTPATIENT
Start: 2019-12-20 | End: 2019-12-25

## 2019-12-20 RX ORDER — IBUPROFEN 600 MG/1
600 TABLET ORAL EVERY 6 HOURS PRN
Qty: 20 TABLET | Refills: 0 | Status: SHIPPED | OUTPATIENT
Start: 2019-12-20 | End: 2023-08-06

## 2019-12-20 RX ORDER — KETOROLAC TROMETHAMINE 10 MG/1
10 TABLET, FILM COATED ORAL
Status: COMPLETED | OUTPATIENT
Start: 2019-12-20 | End: 2019-12-20

## 2019-12-20 RX ADMIN — METHOCARBAMOL TABLETS 750 MG: 750 TABLET, COATED ORAL at 06:12

## 2019-12-20 RX ADMIN — KETOROLAC TROMETHAMINE 10 MG: 10 TABLET, FILM COATED ORAL at 06:12

## 2019-12-21 ENCOUNTER — HOSPITAL ENCOUNTER (EMERGENCY)
Facility: HOSPITAL | Age: 44
Discharge: LEFT BEFORE DISCHARGE | End: 2019-12-21
Attending: EMERGENCY MEDICINE
Payer: MEDICAID

## 2019-12-21 VITALS
OXYGEN SATURATION: 100 % | TEMPERATURE: 99 F | RESPIRATION RATE: 16 BRPM | DIASTOLIC BLOOD PRESSURE: 81 MMHG | SYSTOLIC BLOOD PRESSURE: 128 MMHG | HEART RATE: 90 BPM

## 2019-12-21 DIAGNOSIS — R07.9 CHEST PAIN: ICD-10-CM

## 2019-12-21 DIAGNOSIS — M54.2 NECK PAIN: Primary | ICD-10-CM

## 2019-12-21 LAB
ALBUMIN SERPL BCP-MCNC: 3.7 G/DL (ref 3.5–5.2)
ALP SERPL-CCNC: 68 U/L (ref 55–135)
ALT SERPL W/O P-5'-P-CCNC: 22 U/L (ref 10–44)
ANION GAP SERPL CALC-SCNC: 9 MMOL/L (ref 8–16)
AST SERPL-CCNC: 14 U/L (ref 10–40)
BASOPHILS # BLD AUTO: 0.04 K/UL (ref 0–0.2)
BASOPHILS NFR BLD: 0.4 % (ref 0–1.9)
BILIRUB SERPL-MCNC: 0.7 MG/DL (ref 0.1–1)
BUN SERPL-MCNC: 26 MG/DL (ref 6–20)
CALCIUM SERPL-MCNC: 9.1 MG/DL (ref 8.7–10.5)
CHLORIDE SERPL-SCNC: 110 MMOL/L (ref 95–110)
CO2 SERPL-SCNC: 24 MMOL/L (ref 23–29)
CREAT SERPL-MCNC: 1.2 MG/DL (ref 0.5–1.4)
DIFFERENTIAL METHOD: ABNORMAL
EOSINOPHIL # BLD AUTO: 0.2 K/UL (ref 0–0.5)
EOSINOPHIL NFR BLD: 1.8 % (ref 0–8)
ERYTHROCYTE [DISTWIDTH] IN BLOOD BY AUTOMATED COUNT: 13.7 % (ref 11.5–14.5)
EST. GFR  (AFRICAN AMERICAN): >60 ML/MIN/1.73 M^2
EST. GFR  (NON AFRICAN AMERICAN): >60 ML/MIN/1.73 M^2
GLUCOSE SERPL-MCNC: 102 MG/DL (ref 70–110)
HCT VFR BLD AUTO: 43 % (ref 40–54)
HGB BLD-MCNC: 13.5 G/DL (ref 14–18)
IMM GRANULOCYTES # BLD AUTO: 0.04 K/UL (ref 0–0.04)
IMM GRANULOCYTES NFR BLD AUTO: 0.4 % (ref 0–0.5)
LYMPHOCYTES # BLD AUTO: 2.5 K/UL (ref 1–4.8)
LYMPHOCYTES NFR BLD: 24.8 % (ref 18–48)
MCH RBC QN AUTO: 30.1 PG (ref 27–31)
MCHC RBC AUTO-ENTMCNC: 31.4 G/DL (ref 32–36)
MCV RBC AUTO: 96 FL (ref 82–98)
MONOCYTES # BLD AUTO: 0.9 K/UL (ref 0.3–1)
MONOCYTES NFR BLD: 8.9 % (ref 4–15)
NEUTROPHILS # BLD AUTO: 6.5 K/UL (ref 1.8–7.7)
NEUTROPHILS NFR BLD: 63.7 % (ref 38–73)
NRBC BLD-RTO: 0 /100 WBC
PLATELET # BLD AUTO: 376 K/UL (ref 150–350)
PMV BLD AUTO: 9.2 FL (ref 9.2–12.9)
POTASSIUM SERPL-SCNC: 3.4 MMOL/L (ref 3.5–5.1)
PROT SERPL-MCNC: 6.7 G/DL (ref 6–8.4)
RBC # BLD AUTO: 4.49 M/UL (ref 4.6–6.2)
SODIUM SERPL-SCNC: 143 MMOL/L (ref 136–145)
TROPONIN I SERPL DL<=0.01 NG/ML-MCNC: 0.01 NG/ML (ref 0–0.03)
WBC # BLD AUTO: 10.19 K/UL (ref 3.9–12.7)

## 2019-12-21 PROCEDURE — 99285 EMERGENCY DEPT VISIT HI MDM: CPT | Mod: 25

## 2019-12-21 PROCEDURE — 93010 EKG 12-LEAD: ICD-10-PCS | Mod: ,,, | Performed by: INTERNAL MEDICINE

## 2019-12-21 PROCEDURE — 99285 PR EMERGENCY DEPT VISIT,LEVEL V: ICD-10-PCS | Mod: ,,, | Performed by: EMERGENCY MEDICINE

## 2019-12-21 PROCEDURE — 93005 ELECTROCARDIOGRAM TRACING: CPT

## 2019-12-21 PROCEDURE — 84484 ASSAY OF TROPONIN QUANT: CPT

## 2019-12-21 PROCEDURE — 80053 COMPREHEN METABOLIC PANEL: CPT

## 2019-12-21 PROCEDURE — 85025 COMPLETE CBC W/AUTO DIFF WBC: CPT

## 2019-12-21 PROCEDURE — 99285 EMERGENCY DEPT VISIT HI MDM: CPT | Mod: ,,, | Performed by: EMERGENCY MEDICINE

## 2019-12-21 PROCEDURE — 93010 ELECTROCARDIOGRAM REPORT: CPT | Mod: ,,, | Performed by: INTERNAL MEDICINE

## 2019-12-21 RX ORDER — ACETAMINOPHEN 500 MG
1000 TABLET ORAL
Status: DISCONTINUED | OUTPATIENT
Start: 2019-12-21 | End: 2019-12-21 | Stop reason: HOSPADM
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
## 2019-12-21 NOTE — DISCHARGE INSTRUCTIONS
Take prescribed medications as labeled as needed for pain. Do not take other NSAIDs with ibuprofen such as Advil, Motrin, Aleve, naproxen, or aspirin.  Do not drive, drink alcohol, operate machinery while taking Robaxin.  Follow up with [facility] in 2-3 days return to ED for any concerns or worsening symptoms.
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
## 2019-12-21 NOTE — ED NOTES
LOC: The patient is awake, alert, and oriented to place, time, situation. Affect is appropriate.  Speech is appropriate and clear.     APPEARANCE: Patient resting comfortably in no acute distress.  Patient is clean and well groomed.    SKIN: The skin is warm and dry; color consistent with ethnicity.  Patient has normal skin turgor and moist mucus membranes.  Skin intact; no breakdown or bruising noted.     MUSCULOSKELETAL: Patient moving upper and lower extremities without difficulty.  Pt complains of middle neck pain that radiates to bilat shoulders and down his spine. C-Collar in place.    RESPIRATORY: Airway is open and patent. Respirations spontaneous, even, easy, and non-labored.  Patient has a normal effort and rate.  No accessory muscle use noted. Denies cough. Pt complains of SOB.     CARDIAC:  Normal rhythm and rate noted.  No peripheral edema noted. Pt complains of chest pain..      ABDOMEN: Soft and non tender to palpation.  No distention noted.     NEUROLOGIC: Eyes open spontaneously.  Behavior appropriate to situation.  Follows commands; facial expression symmetrical.  Purposeful motor response noted; pt complains of bilat leg and finger numbness. Pt has some effort against gravity from an old back injury on LLE.

## 2019-12-21 NOTE — PROVIDER PROGRESS NOTES - EMERGENCY DEPT.
Encounter Date: 12/21/2019    ED Physician Progress Notes        Physician Note:   I have assumed care for this patient from GUILLERMINA Yeager.  I have discussed care with the previous attending. I agree with the plan as previously determined. Since assuming care, the patient's course includes: the patient has complained of chest pain while at MRI.  ecg no evidence of ischemia. Troponin ordered and negative.  While awaiting second troponin pt became agitated and left the ED. Prior to leaving AMA he was informed of risks of leaving AMA but did not sign an AMA form.  I have followed up on his MRI cervical spine which revealed cervical spondylosis with severe left neural foraminal narrowing at C2-C3 and severe right neural foraminal narrowing at C3-C4 as well as mild spinal canal stenosis at C3-C4.
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
## 2019-12-21 NOTE — ED NOTES
"RN went to administer tylenol to patient and he was up ambulating in hallway; MICHI Hooks instructed pt back to room to get in bed. Patient stated "nobody has been taking him seriously and he has been left alone in his room for four hours." RN left pts room less than 10 minutes ago and got Tylenol ordered per Dr. Hinkle. (Dr. Henriquez is patients attending physician but she was busy in another pts room)    Pt stated he took 6 of his own medications. (Tylenol PM packets; only one packet found on floor of room) Pt stated he was walking out and going home. Pt removed IV himself and stated he was not going to sign an AMA form. Phuc DOWNS notified and Dr. Henriquez notified.   "
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
## 2020-06-02 NOTE — CARE PLAN
Electrophysiology Problem: Psychosocial Needs:  Goal: Level of anxiety will decrease  Patient did pretty good this morning. Followed commands. This afternoon at 3:00 pm became a different story. Patient is very anxious/agitated. New IV placed. Needs one time dose Ativan. Called and left message with Psyc. Waiting on Call Back. Trying to get patient to chill in his room. Short term memory loss, reoriented every 15 - 20 minutes. Patient expressing desire to leave. Pacing the floor. WCTM

## 2020-11-23 NOTE — DISCHARGE PLANNING
Agency[facility]ility [facility]  Spoke To: Trav  Outcome: Patient declined- behaviors, confusion, not appropriate for their facility. Dayanna CM) notified.     Pt presents to the ED by EMS from home for c/o vomiting syncope. Pt AOx4 reports 1 hour after eating [employer] client felt abd cramping - then vomiting followed by a syncopal episode. Pt denies head neck or back pn. Pt moving all extremities and does not appear to be in distress at this time.

## 2023-02-17 NOTE — CARE PLAN
Problem: Knowledge Deficit  Goal: Knowledge of disease process/condition, treatment plan, diagnostic tests, and medications will improve  Outcome: PROGRESSING SLOWER THAN EXPECTED      Problem: Psychosocial Needs:  Goal: Level of anxiety will decrease  Outcome: PROGRESSING SLOWER THAN EXPECTED         Satisfactory
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
## 2023-08-06 ENCOUNTER — HOSPITAL ENCOUNTER (EMERGENCY)
Facility: HOSPITAL | Age: 48
Discharge: LEFT BEFORE DISCHARGE | End: 2023-08-06
Attending: EMERGENCY MEDICINE
Payer: MEDICAID

## 2023-08-06 VITALS
OXYGEN SATURATION: 96 % | TEMPERATURE: 98 F | DIASTOLIC BLOOD PRESSURE: 63 MMHG | RESPIRATION RATE: 15 BRPM | HEIGHT: 67 IN | HEART RATE: 91 BPM | SYSTOLIC BLOOD PRESSURE: 144 MMHG | BODY MASS INDEX: 30.92 KG/M2 | WEIGHT: 197 LBS

## 2023-08-06 DIAGNOSIS — R07.89 CHEST DISCOMFORT: ICD-10-CM

## 2023-08-06 DIAGNOSIS — R07.9 CHEST PAIN: ICD-10-CM

## 2023-08-06 LAB
ALBUMIN SERPL BCP-MCNC: 3.4 G/DL (ref 3.5–5.2)
ALP SERPL-CCNC: 55 U/L (ref 55–135)
ALT SERPL W/O P-5'-P-CCNC: 33 U/L (ref 10–44)
ANION GAP SERPL CALC-SCNC: 11 MMOL/L (ref 8–16)
AST SERPL-CCNC: 26 U/L (ref 10–40)
BASOPHILS # BLD AUTO: 0.05 K/UL (ref 0–0.2)
BASOPHILS NFR BLD: 0.6 % (ref 0–1.9)
BILIRUB SERPL-MCNC: 0.3 MG/DL (ref 0.1–1)
BNP SERPL-MCNC: 42 PG/ML (ref 0–99)
BUN SERPL-MCNC: 25 MG/DL (ref 6–20)
CALCIUM SERPL-MCNC: 8.7 MG/DL (ref 8.7–10.5)
CHLORIDE SERPL-SCNC: 112 MMOL/L (ref 95–110)
CO2 SERPL-SCNC: 17 MMOL/L (ref 23–29)
CREAT SERPL-MCNC: 1.4 MG/DL (ref 0.5–1.4)
D DIMER PPP IA.FEU-MCNC: 0.34 MG/L FEU
DIFFERENTIAL METHOD: ABNORMAL
EOSINOPHIL # BLD AUTO: 0.2 K/UL (ref 0–0.5)
EOSINOPHIL NFR BLD: 2.1 % (ref 0–8)
ERYTHROCYTE [DISTWIDTH] IN BLOOD BY AUTOMATED COUNT: 12.7 % (ref 11.5–14.5)
EST. GFR  (NO RACE VARIABLE): >60 ML/MIN/1.73 M^2
GLUCOSE SERPL-MCNC: 92 MG/DL (ref 70–110)
HCT VFR BLD AUTO: 44.7 % (ref 40–54)
HCV AB SERPL QL IA: NORMAL
HGB BLD-MCNC: 15.5 G/DL (ref 14–18)
HIV 1+2 AB+HIV1 P24 AG SERPL QL IA: NORMAL
IMM GRANULOCYTES # BLD AUTO: 0.04 K/UL (ref 0–0.04)
IMM GRANULOCYTES NFR BLD AUTO: 0.5 % (ref 0–0.5)
LYMPHOCYTES # BLD AUTO: 2 K/UL (ref 1–4.8)
LYMPHOCYTES NFR BLD: 25.3 % (ref 18–48)
MCH RBC QN AUTO: 32 PG (ref 27–31)
MCHC RBC AUTO-ENTMCNC: 34.7 G/DL (ref 32–36)
MCV RBC AUTO: 92 FL (ref 82–98)
MONOCYTES # BLD AUTO: 0.6 K/UL (ref 0.3–1)
MONOCYTES NFR BLD: 7.3 % (ref 4–15)
NEUTROPHILS # BLD AUTO: 5 K/UL (ref 1.8–7.7)
NEUTROPHILS NFR BLD: 64.2 % (ref 38–73)
NRBC BLD-RTO: 0 /100 WBC
PLATELET # BLD AUTO: 270 K/UL (ref 150–450)
PMV BLD AUTO: 9.7 FL (ref 9.2–12.9)
POC CARDIAC TROPONIN I: 0 NG/ML (ref 0–0.08)
POTASSIUM SERPL-SCNC: 4.5 MMOL/L (ref 3.5–5.1)
PROT SERPL-MCNC: 7.1 G/DL (ref 6–8.4)
RBC # BLD AUTO: 4.85 M/UL (ref 4.6–6.2)
SAMPLE: NORMAL
SODIUM SERPL-SCNC: 140 MMOL/L (ref 136–145)
TROPONIN I SERPL DL<=0.01 NG/ML-MCNC: <0.006 NG/ML (ref 0–0.03)
WBC # BLD AUTO: 7.8 K/UL (ref 3.9–12.7)

## 2023-08-06 PROCEDURE — 99284 PR EMERGENCY DEPT VISIT,LEVEL IV: ICD-10-PCS | Mod: 25,,, | Performed by: INTERNAL MEDICINE

## 2023-08-06 PROCEDURE — 83880 ASSAY OF NATRIURETIC PEPTIDE: CPT | Performed by: EMERGENCY MEDICINE

## 2023-08-06 PROCEDURE — 25000242 PHARM REV CODE 250 ALT 637 W/ HCPCS: Performed by: EMERGENCY MEDICINE

## 2023-08-06 PROCEDURE — 84484 ASSAY OF TROPONIN QUANT: CPT | Performed by: EMERGENCY MEDICINE

## 2023-08-06 PROCEDURE — 85379 FIBRIN DEGRADATION QUANT: CPT | Performed by: EMERGENCY MEDICINE

## 2023-08-06 PROCEDURE — 87389 HIV-1 AG W/HIV-1&-2 AB AG IA: CPT | Performed by: PHYSICIAN ASSISTANT

## 2023-08-06 PROCEDURE — 86803 HEPATITIS C AB TEST: CPT | Performed by: PHYSICIAN ASSISTANT

## 2023-08-06 PROCEDURE — 99285 EMERGENCY DEPT VISIT HI MDM: CPT | Mod: 25

## 2023-08-06 PROCEDURE — 93010 ELECTROCARDIOGRAM REPORT: CPT | Mod: ,,, | Performed by: INTERNAL MEDICINE

## 2023-08-06 PROCEDURE — 84484 ASSAY OF TROPONIN QUANT: CPT

## 2023-08-06 PROCEDURE — 99284 EMERGENCY DEPT VISIT MOD MDM: CPT | Mod: 25,,, | Performed by: INTERNAL MEDICINE

## 2023-08-06 PROCEDURE — 63600175 PHARM REV CODE 636 W HCPCS: Performed by: EMERGENCY MEDICINE

## 2023-08-06 PROCEDURE — 96374 THER/PROPH/DIAG INJ IV PUSH: CPT

## 2023-08-06 PROCEDURE — 25000003 PHARM REV CODE 250: Performed by: EMERGENCY MEDICINE

## 2023-08-06 PROCEDURE — 80053 COMPREHEN METABOLIC PANEL: CPT | Performed by: EMERGENCY MEDICINE

## 2023-08-06 PROCEDURE — 93005 ELECTROCARDIOGRAM TRACING: CPT

## 2023-08-06 PROCEDURE — 85025 COMPLETE CBC W/AUTO DIFF WBC: CPT | Performed by: EMERGENCY MEDICINE

## 2023-08-06 PROCEDURE — 93010 EKG 12-LEAD: ICD-10-PCS | Mod: ,,, | Performed by: INTERNAL MEDICINE

## 2023-08-06 RX ORDER — KETOROLAC TROMETHAMINE 30 MG/ML
INJECTION, SOLUTION INTRAMUSCULAR; INTRAVENOUS
Status: DISCONTINUED
Start: 2023-08-06 | End: 2023-08-06 | Stop reason: HOSPADM

## 2023-08-06 RX ORDER — KETOROLAC TROMETHAMINE 30 MG/ML
10 INJECTION, SOLUTION INTRAMUSCULAR; INTRAVENOUS
Status: COMPLETED | OUTPATIENT
Start: 2023-08-06 | End: 2023-08-06

## 2023-08-06 RX ORDER — NITROGLYCERIN 0.4 MG/1
TABLET SUBLINGUAL
Status: DISCONTINUED
Start: 2023-08-06 | End: 2023-08-06 | Stop reason: HOSPADM

## 2023-08-06 RX ORDER — ASPIRIN 325 MG
325 TABLET ORAL
Status: COMPLETED | OUTPATIENT
Start: 2023-08-06 | End: 2023-08-06

## 2023-08-06 RX ORDER — ASPIRIN 325 MG
TABLET ORAL
Status: DISCONTINUED
Start: 2023-08-06 | End: 2023-08-06 | Stop reason: HOSPADM

## 2023-08-06 RX ORDER — NITROGLYCERIN 0.4 MG/1
0.4 TABLET SUBLINGUAL EVERY 5 MIN PRN
Status: DISCONTINUED | OUTPATIENT
Start: 2023-08-06 | End: 2023-08-06 | Stop reason: HOSPADM

## 2023-08-06 RX ADMIN — ASPIRIN 325 MG ORAL TABLET 325 MG: 325 PILL ORAL at 04:08

## 2023-08-06 RX ADMIN — NITROGLYCERIN 0.4 MG: 0.4 TABLET, ORALLY DISINTEGRATING SUBLINGUAL at 04:08

## 2023-08-06 RX ADMIN — KETOROLAC TROMETHAMINE 10 MG: 30 INJECTION INTRAMUSCULAR; INTRAVENOUS at 05:08

## 2023-08-06 NOTE — DISCHARGE INSTRUCTIONS
You have been seen here today in the emergency department for chest pain.  I found your story concerning and wanted to admit him to the hospital for further workup.  However, if you wish for discharge against medical advice.  You and I have discussed the risks of leaving including myocardial infarction, further heart damage, heart rhythms that are abnormal, or even death.  Please follow-up with your primary physician as soon as possible to continue this workup and know that your also always welcome to return here for further workup at any time.  If you experience recurrent severe pain in particular or any shortness of breath or any other concerns your also welcome to return at any time.  Please continue your regular medications for your heart as previously prescribed.  Our goal in the emergency department is to always give you outstanding care and exceptional service. You may receive a survey by mail or e-mail in the next week regarding your experience in our ED. We would greatly appreciate your completing and returning the survey. Your feedback provides us with a way to recognize our staff who give very good care and it helps us learn how to improve when your experience was below our aspiration of excellence.

## 2023-08-06 NOTE — PHARMACY MED REC
"Admission Medication History     The home medication history was taken by Anderson Roberto.    You may go to "Admission" then "Reconcile Home Medications" tabs to review and/or act upon these items.     The home medication list has been updated by the Pharmacy department.   Please read ALL comments highlighted in yellow.   Please address this information as you see fit.    Feel free to contact us if you have any questions or require assistance.      The medications listed below were removed from the home medication list. Please reorder if appropriate:  Patient reports no longer taking the following medication(s):  ATORVASTATIN 80 MG TABLET  CLOPIDOGREL 75 MG TABLET  HYDROCODONE-ACETAMINOPHEN 5-325 MG TABLET  IBUPROFEN 600 MG TABLET  LIDOCAINE 5 % PATCH  LISINOPRIL 2.5 MG TABLET  METOPROLOL SUCCINATE XL 25 MG TABLET  NITROGLYCERIN 0.3 MG SL TABLET  WARFARIN 5 MG TABLET      Current Outpatient Medications on File Prior to Encounter   Medication Sig    aspirin (ECOTRIN) 81 MG EC tablet   Take 1 tablet (81 mg total) by mouth once daily.    ticagrelor (BRILINTA) 60 mg tablet   Take 60 mg by mouth 2 (two) times daily.     Potential issues to be addressed PRIOR TO DISCHARGE  Please discuss with the patient barriers to adherence with medication treatment plans  Patient requires education regarding drug therapies     Anderson Roberto  EXT [id number]                  .          "

## 2023-08-06 NOTE — ASSESSMENT & PLAN NOTE
Talked to interventional cardiology staff on call and given nature of pain on history and physical exam and negative trop, no plan for stat lab activation.

## 2023-08-06 NOTE — CONSULTS
[facility] - Emergency Dept  General Cardiology  Consult Note    Patient Name: Ganesh Estrada  MRN: [id number]  Admission Date: 8/6/2023  Hospital Length of Stay: 0 days  Code Status: Prior   Attending Provider: Jessa Rivas MD   Consulting Provider: Nirav Barajas MD  Primary Care Physician: No, Primary Doctor  Principal Problem:<principal problem not specified>    Patient information was obtained from patient and ER records.     Inpatient consult to Cardiology  Consult performed by: Nirav Barajas MD  Consult ordered by: Jessa Rivas MD        Subjective:     Chief Complaint:  Chest pain    HPI:   48 years old gentleman with known history of CAD, 3 PCIs (1st in [facility] 3 years ago, 2nd PCI in [place] few months after 1st PCI, 3rd PCI in [place] few months ago; unknown coronary anatomy), previous ischemic cardiomyopathy with EF 20-25%, medication noncompliance, prior LV thrombus but does NOT take coumadin though it was prescribed, opioid and methamphetamine use and active smoking half a pack per day. Patient states since last PCI few months ago, he has been taking aspirin about 3 times a week and does not take Plavix.    Patient presented to the emergency department with severe chest pain which increases with inspiration and also is reproducible to touch.  It started at 5:00 a.m. and it has been constant since.  It has not been coming and going.  He has taken 3 sublingual nitroglycerin with no relief.  No associated shortness of breath or dizziness.  In the emergency department his troponin is negative.  EKG shows T-wave inversion with slight ST elevation in V2 alone.  Bedside echo shows apical akinesia and depressed ejection fraction about 35%. Hemodynamically stable with HR sinus in 80s.     Talked to interventional cardiology staff on call and given nature of pain on history and physical exam and negative trop, no plan for stat lab activation.       Past Medical History:   Diagnosis Date     Arthritis        Past Surgical History:   Procedure Laterality Date    CARDIAC SURGERY         Review of patient's allergies indicates:  No Known Allergies    No current facility-administered medications on file prior to encounter.     Current Outpatient Medications on File Prior to Encounter   Medication Sig    aspirin (ECOTRIN) 81 MG EC tablet Take 1 tablet (81 mg total) by mouth once daily.    atorvastatin (LIPITOR) 80 MG tablet Take 80 mg by mouth once daily.    clopidogrel (PLAVIX) 75 mg tablet Take 75 mg by mouth once daily.    HYDROcodone-acetaminophen (NORCO) 5-325 mg per tablet Take 1 tablet by mouth every 6 (six) hours as needed for Pain.    ibuprofen (ADVIL,MOTRIN) 600 MG tablet Take 1 tablet (600 mg total) by mouth every 6 (six) hours as needed for Pain.    lidocaine (LIDODERM) 5 % Place 1 patch onto the skin once daily. Remove & Discard patch within 12 hours or as directed by MD    lisinopril (PRINIVIL,ZESTRIL) 2.5 MG tablet Take 2.5 mg by mouth once daily.    metoprolol succinate (TOPROL-XL) 25 MG 24 hr tablet Take 25 mg by mouth once daily. Take 1/2 A tablet once daily    nitroGLYCERIN (NITROSTAT) 0.3 MG SL tablet Place 0.3 mg under the tongue every 5 (five) minutes as needed for Chest pain.    warfarin (COUMADIN) 5 MG tablet Take 5 mg by mouth once daily.     Family History    None       Tobacco Use    Smoking status: Every Day     Current packs/day: 2.00     Average packs/day: 2.0 packs/day for 41.6 years (83.2 ttl pk-yrs)     Types: Cigarettes     Start date: 1982    Smokeless tobacco: Never    Tobacco comments:     Pt declines enrollment in [employer] [employer]. Handout provided for Ambulatory Smoking Cessation program.l   Substance and Sexual Activity    Alcohol use: Not Currently    Drug use: Never    Sexual activity: Not Currently     Review of Systems   Constitutional: Negative for fever.   HENT:  Negative for ear pain.    Eyes:  Negative for double vision.   Cardiovascular:  Positive for chest  pain. Negative for palpitations.   Respiratory:  Negative for shortness of breath.    Endocrine: Negative for heat intolerance.   Hematologic/Lymphatic: Negative for adenopathy.   Skin:  Negative for dry skin.   Musculoskeletal:  Negative for falls.   Gastrointestinal:  Negative for anorexia.   Genitourinary:  Negative for flank pain.   Neurological:  Negative for disturbances in coordination.   Psychiatric/Behavioral:  Negative for depression.      Objective:     Vital Signs (Most Recent):  Temp: 97.9 °F (36.6 °C) (08/06/23 1507)  Pulse: 75 (08/06/23 1657)  Resp: 15 (08/06/23 1624)  BP: 117/75 (08/06/23 1657)  SpO2: 98 % (08/06/23 1657) Vital Signs (24h Range):  Temp:  [97.9 °F (36.6 °C)] 97.9 °F (36.6 °C)  Pulse:  [] 75  Resp:  [15-20] 15  SpO2:  [96 %-98 %] 98 %  BP: (112-132)/(75-90) 117/75     Weight: 89.4 kg (197 lb)  Body mass index is 30.85 kg/m².    SpO2: 98 %       No intake or output data in the 24 hours ending 08/06/23 1733    Lines/Drains/Airways       Peripheral Intravenous Line  Duration                  Peripheral IV - Single Lumen 08/06/23 1630 18 G Right;Posterior Hand <1 day                     Physical Exam  HENT:      Head: Normocephalic.      Mouth/Throat:      Mouth: Mucous membranes are moist.   Cardiovascular:      Rate and Rhythm: Normal rate and regular rhythm. L sided chest tenderness  Pulmonary:      Effort: Pulmonary effort is normal.   Abdominal:      General: There is distension.      Palpations: Abdomen is soft.   Neurological:      Mental Status: He is alert.   Psychiatric:         Mood and Affect: Mood normal.              Assessment and Plan:     Chest pain  - known history of CAD, 3 PCIs (1st in [facility] 3 years ago, 2nd PCI in [place] few months after 1st PCI, 3rd PCI in [place] few months ago; unknown coronary anatomy)  - previous ischemic cardiomyopathy with EF 20-25%, medication noncompliance, prior LV thrombus but does NOT take coumadin though it was prescribed  - opioid  and methamphetamine use and active smoking half a pack per day. Patient states since last PCI few months ago, he has been taking aspirin about 3 times a week and does not take Plavix.    -with severe chest pain which increases with inspiration and also is reproducible to touch.  - It started at 5:00 a.m. and it has been constant since  -He has taken 3 sublingual nitroglycerin with no relief    -No associated shortness of breath or dizziness.    -1st troponin is negative  - EKG shows T-wave inversion with slight ST elevation in V2 alone  -Bedside echo shows apical akinesia and depressed ejection fraction about 35%.  - Hemodynamically stable with HR sinus in 80s.     Recommendations:  -Talked to interventional cardiology staff on call and given nature of pain on history and physical exam and negative trop, no plan for stat lab activation  -trend troponin to make sure not a type 1 MI  -evaluate for other causes of chest pain meanwhile given pleuritic nature of pain and tenderness  -resume DAPT and HI statin (wasn't taking at home regularly)  -toprol xl 50 daily starting now  -obtain formal echo    Thank you for your consult.     Nirav Barajas MD  Cardiology   [facility]  I have personally taken the history and examined the patient and agree with the resident's note as stated above. Cath only if troponins high as this seems non cardiac muscular or pulmonary? Agree with CFD.

## 2023-08-06 NOTE — Clinical Note
"Ganesh"Ronak Estrada was seen and treated in our emergency department on 8/6/2023.  He may return to work on 08/07/2023.       If you have any questions or concerns, please don't hesitate to call.      Jessa Rivas MD"

## 2023-08-06 NOTE — HPI
48 years old gentleman with known history of CAD, 3 PCIs (1st in [facility] 3 years ago, 2nd PCI in [place] few months after 1st PCI, 3rd PCI in [place] few months ago; unknown coronary anatomy), previous ischemic cardiomyopathy with EF 20-25%, medication noncompliance, prior LV thrombus but does NOT take coumadin though it was prescribed, opioid and methamphetamine use and active smoking half a pack per day. Patient states since last PCI few months ago, he has been taking aspirin about 3 times a week and does not take Plavix.    Patient presented to the emergency department with severe chest pain which increases with inspiration and also is reproducible to touch.  It started at 5:00 a.m. and it has been constant since.  It has not been coming and going.  He has taken 3 sublingual nitroglycerin with no relief.  No associated shortness of breath or dizziness.  In the emergency department his troponin is negative.  EKG shows T-wave inversion with slight ST elevation in V2 alone.  Bedside echo shows apical akinesia and depressed ejection fraction about 35%. Hemodynamically stable with HR sinus in 80s.     Talked to interventional cardiology staff on call and given nature of pain on history and physical exam and negative trop, no plan for stat lab activation.

## 2023-08-06 NOTE — CONSULTS
[facility] - Emergency Dept  Interventional Cardiology  Consult Note    Patient Name: Ganesh Estrada  MRN: [id number]  Admission Date: 8/6/2023  Hospital Length of Stay: 0 days  Code Status: Prior   Attending Provider: Jessa Rivas MD   Consulting Provider: Nirav Barajas MD  Primary Care Physician: No, Primary Doctor  Principal Problem:<principal problem not specified>    Patient information was obtained from patient and ER records.     Subjective:     Chief Complaint:  Chest pain    HPI:   48 years old gentleman with known history of CAD, 3 PCIs (1st in [facility] 3 years ago, 2nd PCI in [place] few months after 1st PCI, 3rd PCI in [place] few months ago; unknown coronary anatomy), previous ischemic cardiomyopathy with EF 20-25%, medication noncompliance, prior LV thrombus but does NOT take coumadin though it was prescribed, opioid and methamphetamine use and active smoking half a pack per day. Patient states since last PCI few months ago, he has been taking aspirin about 3 times a week and does not take Plavix.    Patient presented to the emergency department with severe chest pain which increases with inspiration and also is reproducible to touch.  It started at 5:00 a.m. and it has been constant since.  It has not been coming and going.  He has taken 3 sublingual nitroglycerin with no relief.  No associated shortness of breath or dizziness.  In the emergency department his troponin is negative.  EKG shows T-wave inversion with slight ST elevation in V2 alone.  Bedside echo shows apical akinesia and depressed ejection fraction about 35%. Hemodynamically stable with HR sinus in 80s.     Talked to interventional cardiology staff on call and given nature of pain on history and physical exam and negative trop, no plan for stat lab activation.       Past Medical History:   Diagnosis Date    Arthritis        Past Surgical History:   Procedure Laterality Date    CARDIAC SURGERY         Review of patient's allergies  indicates:  No Known Allergies    No current facility-administered medications on file prior to encounter.     Current Outpatient Medications on File Prior to Encounter   Medication Sig    aspirin (ECOTRIN) 81 MG EC tablet Take 1 tablet (81 mg total) by mouth once daily.    atorvastatin (LIPITOR) 80 MG tablet Take 80 mg by mouth once daily.    clopidogrel (PLAVIX) 75 mg tablet Take 75 mg by mouth once daily.    HYDROcodone-acetaminophen (NORCO) 5-325 mg per tablet Take 1 tablet by mouth every 6 (six) hours as needed for Pain.    ibuprofen (ADVIL,MOTRIN) 600 MG tablet Take 1 tablet (600 mg total) by mouth every 6 (six) hours as needed for Pain.    lidocaine (LIDODERM) 5 % Place 1 patch onto the skin once daily. Remove & Discard patch within 12 hours or as directed by MD    lisinopril (PRINIVIL,ZESTRIL) 2.5 MG tablet Take 2.5 mg by mouth once daily.    metoprolol succinate (TOPROL-XL) 25 MG 24 hr tablet Take 25 mg by mouth once daily. Take 1/2 A tablet once daily    nitroGLYCERIN (NITROSTAT) 0.3 MG SL tablet Place 0.3 mg under the tongue every 5 (five) minutes as needed for Chest pain.    warfarin (COUMADIN) 5 MG tablet Take 5 mg by mouth once daily.     Family History    None       Tobacco Use    Smoking status: Every Day     Current packs/day: 2.00     Average packs/day: 2.0 packs/day for 41.6 years (83.2 ttl pk-yrs)     Types: Cigarettes     Start date: 1982    Smokeless tobacco: Never    Tobacco comments:     Pt declines enrollment in [employer]. Handout provided for Ambulatory Smoking Cessation program.l   Substance and Sexual Activity    Alcohol use: Not Currently    Drug use: Never    Sexual activity: Not Currently     Review of Systems   Constitutional: Negative for fever.   HENT:  Negative for ear pain.    Eyes:  Negative for double vision.   Cardiovascular:  Positive for chest pain. Negative for palpitations.   Respiratory:  Negative for shortness of breath.    Endocrine: Negative for heat intolerance.    Hematologic/Lymphatic: Negative for adenopathy.   Skin:  Negative for dry skin.   Musculoskeletal:  Negative for falls.   Gastrointestinal:  Negative for anorexia.   Genitourinary:  Negative for flank pain.   Neurological:  Negative for disturbances in coordination.   Psychiatric/Behavioral:  Negative for depression.      Objective:     Vital Signs (Most Recent):  Temp: 97.9 °F (36.6 °C) (08/06/23 1507)  Pulse: 75 (08/06/23 1657)  Resp: 15 (08/06/23 1624)  BP: 117/75 (08/06/23 1657)  SpO2: 98 % (08/06/23 1657) Vital Signs (24h Range):  Temp:  [97.9 °F (36.6 °C)] 97.9 °F (36.6 °C)  Pulse:  [] 75  Resp:  [15-20] 15  SpO2:  [96 %-98 %] 98 %  BP: (112-132)/(75-90) 117/75     Weight: 89.4 kg (197 lb)  Body mass index is 30.85 kg/m².    SpO2: 98 %       No intake or output data in the 24 hours ending 08/06/23 1733    Lines/Drains/Airways       Peripheral Intravenous Line  Duration                  Peripheral IV - Single Lumen 08/06/23 1630 18 G Right;Posterior Hand <1 day                     Physical Exam  HENT:      Head: Normocephalic.      Mouth/Throat:      Mouth: Mucous membranes are moist.   Cardiovascular:      Rate and Rhythm: Normal rate and regular rhythm. L sided chest tenderness  Pulmonary:      Effort: Pulmonary effort is normal.   Abdominal:      General: There is distension.      Palpations: Abdomen is soft.   Neurological:      Mental Status: He is alert.   Psychiatric:         Mood and Affect: Mood normal.              Assessment and Plan:     Chest pain  - known history of CAD, 3 PCIs [place] 3 years [place], 2nd PCI in [place] few months after 1st PCI, 3rd PCI in [place] few months ago; unknown coronary anatomy)  - previous ischemic cardiomyopathy with EF 20-25%, medication noncompliance, prior LV thrombus but does NOT take coumadin though it was prescribed  - opioid and methamphetamine use and active smoking half a pack per day. Patient states since last PCI few months ago, he has been taking  aspirin about 3 times a week and does not take Plavix.    -with severe chest pain which increases with inspiration and also is reproducible to touch.  - It started at 5:00 a.m. and it has been constant since  -He has taken 3 sublingual nitroglycerin with no relief    -No associated shortness of breath or dizziness.    -1st troponin is negative  - EKG shows T-wave inversion with slight ST elevation in V2 alone  -Bedside echo shows apical akinesia and depressed ejection fraction about 35%.  - Hemodynamically stable with HR sinus in 80s.     Recommendations:  -Talked to interventional cardiology staff on call and given nature of pain on history and physical exam and negative trop, no plan for stat lab activation  -trend troponin to make sure not a type 1 MI  -evaluate for other causes of chest pain meanwhile given pleuritic nature of pain and tenderness  -resume DAPT and HI statin (wasn't taking at home regularly)  -toprol xl 50 daily starting now  -obtain formal echo    Thank you for your consult. Interventional cardiology will sign off, Please let us know if you need to see the patient again.     Nirav Barajas MD  Cardiology   [facility]

## 2023-08-06 NOTE — ED NOTES
States mid sternal chest pain radiating to L arm and R shoulder starting at 0500. Took 2 nitro and ASA at home with no relief. Does endorse 5-6 red bulls daily.Extensive cardiac history with stents.      Patient identifiers for Ganesh Estrada 48 y.o. male checked and correct.  Chief Complaint   Patient presents with    Chest Pain     Past Medical History:   Diagnosis Date    Arthritis      Allergies reported: Review of patient's allergies indicates:  No Known Allergies    Appearance: Pt awake, alert & oriented to person, place & time. Pt in no acute distress at present time. Pt is clean and well groomed with clothes appropriately fastened.   Skin: Skin warm, dry & intact. Color consistent with ethnicity. Mucous membranes moist. No breakdown or brusing noted.   Musculoskeletal: Patient moving all extremities well, no obvious swelling or deformities noted.   Respiratory: Respirations spontaneous, even, and non-labored. Visible chest rise noted. Airway is open and patent. No accessory muscle use noted.   Neurologic: Sensation is intact. Speech is clear and appropriate. Eyes open spontaneously, behavior appropriate to situation, follows commands, facial expression symmetrical, bilateral hand grasp equal and even, purposeful motor response noted.  Cardiac: All peripheral pulses present. No Bilateral lower extremity edema. Cap refill is <3 seconds.  Abdomen: Abdomen soft, non distended, non tender to palpation.   : Pt voids independently, denies dysuria, hematuria, frequency.

## 2023-08-06 NOTE — ED PROVIDER NOTES
Encounter Date: 8/6/2023       History     Chief Complaint   Patient presents with    Chest Pain     This is a 48-year-old male who has a known history of coronary artery disease who presents to the emergency department with chest pain.    External sources of information include records external to today's visit.  The patient is also accompanied by his son in the room that provides some history.    The patient states that he is had chest discomfort since this morning.  He describes a crushing type sensation in his mid chest.  Has some radiation of the discomfort to his right arm into his scapula.  No ripping or tearing type sensation however. no pain that was maximal in intensity at onset.  He also provides that there was somewhat of a positional component to it to that when he turned on his left side and made the pain worse.  He has tried 2 nitros for the pain earlier today with minimal improvement.  There is no pleuritic quality to the pain.  He is had no cough.  He is had no fever.  He is had no other associated symptoms other than some diaphoresis earlier this morning.  He is had no nausea or vomiting.  No changes in his stool.  No bleeding diatheses.  No abdominal pain.  In reviewing his past medical history in Care everywhere the patient has had right coronary artery PCIs in the remote past and from what he is telling me he had a in stent restenosis of that artery and it was we stented.  He tells me that he had a total of 3 stents placed in the past.  I do see note that he had an 80-90% obtuse marginal disease as well but I do not know where the 3rd stent was placed.  I do see evidence of a last TTE that showed an ejection fraction of 25%.  I also see listed a history of deep vein thrombosis as well as methamphetamine abuse.  He states currently he does not use any drugs.  Did have an excessive caffeine this morning with some excess red bulls.  Does smoke cigarettes but does not smoke any other  drugs.    Review of patient's allergies indicates:  No Known Allergies  Past Medical History:   Diagnosis Date    Arthritis      Past Surgical History:   Procedure Laterality Date    CARDIAC SURGERY       No family history on file.  Social History     Tobacco Use    Smoking status: Every Day     Current packs/day: 2.00     Average packs/day: 2.0 packs/day for 41.6 years (83.2 ttl pk-yrs)     Types: Cigarettes     Start date: 1982    Smokeless tobacco: Never    Tobacco comments:     Pt declines enrollment in [facility]. Handout provided for Ambulatory Smoking Cessation program.l   Substance Use Topics    Alcohol use: Not Currently    Drug use: Never     Review of Systems   Constitutional:  Negative for chills and fever.   HENT:  Negative for sore throat.    Eyes:  Negative for visual disturbance.   Respiratory:  Positive for shortness of breath. Negative for cough.    Cardiovascular:  Positive for chest pain.   Gastrointestinal:  Negative for abdominal pain, diarrhea, nausea and vomiting.   Genitourinary:  Negative for dysuria.   Musculoskeletal:  Negative for back pain.   Skin:  Negative for rash.   Neurological:  Negative for weakness, light-headedness and numbness.   Hematological:  Does not bruise/bleed easily.   Psychiatric/Behavioral:  Negative for confusion.        Physical Exam     Initial Vitals [08/06/23 1507]   BP Pulse Resp Temp SpO2   112/80 100 20 97.9 °F (36.6 °C) 97 %      MAP       --         Physical Exam    Consititutional: Pt is awake, alert, and oriented x 4.  Complaining of 10/10 chest pain.  HEENT: PERRL; EOMI; nares patent; op clear; mmm without lesions.  Neck: Supple with good ROM.  CV: Normal rate; regular rhythm; no mrg. Heart sounds normal. No peripheral edema.  No Homans or cords.  2+ radials bilateral and symmetric.  Respiratory: CTA bilaterally with no focal rales, ronchi, or wheezes.  GI: Abdomen soft, NTND. No rebound. No guarding. BS normal.  MSK: No long bone  deformities; no focal joint swellings.  Neuro:  Cranial nerves 2-12 are intact.  Normal motor function throughout his extremities and normal sensation to fine touch grossly intact throughout.  Skin: No skin lesions or rash.  Multiple tattoos.  ED Course   Procedures  Labs Reviewed   HIV 1 / 2 ANTIBODY   HEPATITIS C ANTIBODY   CBC W/ AUTO DIFFERENTIAL   COMPREHENSIVE METABOLIC PANEL   TROPONIN I   TROPONIN I   B-TYPE NATRIURETIC PEPTIDE   D DIMER, QUANTITATIVE   POCT TROPONIN   POCT TROPONIN          Imaging Results    None          Medications   nitroGLYCERIN SL tablet 0.4 mg (0.4 mg Sublingual Given 8/6/23 1637)   aspirin tablet 325 mg (325 mg Oral Given 8/6/23 1637)     Medical Decision Making:   History:   I obtained history from: someone other than patient.       <> Summary of History: Patient's son is at the bedside as well.  Old Medical Records: I decided to obtain old medical records.  Old Records Summarized: records from previous admission(s) and records from clinic visits.       <> Summary of Records: As above per HPI.  Initial Assessment:   This is a 48-year-old male who has known coronary disease who presents with chest pain.  Differential Diagnosis:   Obviously are greatest concern is cardiac ischemia once again.  We are obtaining troponin and I have already reviewed the ECGs.  The ECG initially here, independently reviewed and interpreted by me, reveals a biphasic appearance to the ST and T-waves in V1 and V2 that has somewhat of a wellens appearance but in comparison to his previous EKG from 2019 it has been present with a similar appearance in the past.  I did speak with Cardiology about this and we are obtaining a repeat EKG.  The cardiology fellow is going to consult on this patient given his history and these EKG findings.  I have given the patient a cardiac dose of aspirin and we will try nitroglycerin for his pain.  We will also entertain other causes of chest pain and rule out any pneumonia or  pulmonary embolus.  I feel that aortic dissection is much lower on the differential given his description of the pain in his exam.    Independently Interpreted Test(s):   I have ordered and independently interpreted X-rays - see prior notes.  I have ordered and independently interpreted EKG Reading(s) - see prior notes  Clinical Tests:   Lab Tests: Ordered and Reviewed  Radiological Study: Ordered and Reviewed  Medical Tests: Ordered and Reviewed  ED Management:  The patient's ECGs as above had a concerning Wellens' type appearance. Cardiology evaluated the patient at the bedside and we spoke about the case and discussed the plan. At this point, they are not taking the patient straight to the cath lab but advised admission to hospital medicine for serial troponins and other continued workup. His initial troponin is normal.   In addition his other labs returned and I have reviewed them. This includes a normal WBC of 7.80, a hct of 44.7, a CMP that is normal, a troponin as above that is normal, and a BNP that is normal. His d dimer also is < 500 which is reassuring to rule out other causes such as PE. CXR, also, independently reviewed and interpreted by me and interpreted by radiology, reveals no acute infiltrate, fracture, effusion, or pneumothorax.  As I was working on next steps of admitting the patient, he decided he wanted to leave. He stated that his pain had resolved. He wished for discharge. I had a lengthy discussion with him about the risks of leaving and benefits of staying. He has decisional capacity. He voiced understanding of the risks of leaving including myocardial infarction and even death. I also informed him that he is welcome to return at anytime if he wishes to continue the workup and that he should follow up as soon as possible with his physician or a cardiologist to continue this workup should he decide not to return to the ED to continue the evaluation. He has voiced understanding of all of  this.   He is leaving against medical advice. Cardiology is aware of his decision.   ED Diagnosis:  1. Acute chest pain, complicated by known CAD.   Other:   I have discussed this case with another health care provider.       <> Summary of the Discussion: Discussed case with cardiology and they have evaluated the patient as well.                          Clinical Impression:   Final diagnoses:  [R07.89] Chest discomfort  [R07.9] Chest pain               Jessa Rivas MD  08/14/23 1016
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
## 2023-12-21 NOTE — CARE PLAN
Problem: Communication  Goal: The ability to communicate needs accurately and effectively will improve    Intervention: [place] patient and significant other/support system to call light to alert staff of needs  Patient has call light within reach, patient able to alert staff as need, and rn will continue to monitor patient      Problem: Safety  Goal: Will remain free from falls    Intervention: Implement fall precautions  Patient has safety sitter at bedside        Problem: Infection  Goal: Will remain free from infection    Intervention: Implement standard precautions and perform hand washing before and after patient contact  Hand hygiene performed before and after patient interaction          no

## 2024-12-09 NOTE — CARE PLAN
Problem: Safety  Goal: Will remain free from falls  Outcome: PROGRESSING AS EXPECTED  1:1 sitter at bedside for safety. Fall precautions in place      Problem: Skin Integrity  Goal: Risk for impaired skin integrity will decrease  Outcome: PROGRESSING AS EXPECTED  Skin assessed for signs of breakdown         Pt noted resting on cart, turning self side to side, Will continue to monitor

## 2025-04-15 ENCOUNTER — HOSPITAL ENCOUNTER (EMERGENCY)
Facility: HOSPITAL | Age: 50
Discharge: HOME | End: 2025-04-15
Attending: EMERGENCY MEDICINE
Payer: MEDICAID

## 2025-04-15 VITALS
HEART RATE: 81 BPM | BODY MASS INDEX: 30.73 KG/M2 | SYSTOLIC BLOOD PRESSURE: 119 MMHG | WEIGHT: 180 LBS | TEMPERATURE: 97 F | HEIGHT: 64 IN | RESPIRATION RATE: 25 BRPM | DIASTOLIC BLOOD PRESSURE: 53 MMHG | OXYGEN SATURATION: 96 %

## 2025-04-15 DIAGNOSIS — R07.9 CHEST PAIN: Primary | ICD-10-CM

## 2025-04-15 LAB
ABSOLUTE EOSINOPHIL (OHS): 0.21 K/UL
ABSOLUTE MONOCYTE (OHS): 0.73 K/UL (ref 0.3–1)
ABSOLUTE NEUTROPHIL COUNT (OHS): 5.74 K/UL (ref 1.8–7.7)
ALBUMIN SERPL BCP-MCNC: 3.2 G/DL (ref 3.5–5.2)
ALP SERPL-CCNC: 57 UNIT/L (ref 40–150)
ALT SERPL W/O P-5'-P-CCNC: 24 UNIT/L (ref 10–44)
ANION GAP (OHS): 11 MMOL/L (ref 8–16)
AST SERPL-CCNC: 23 UNIT/L (ref 11–45)
BASOPHILS # BLD AUTO: 0.05 K/UL
BASOPHILS NFR BLD AUTO: 0.5 %
BILIRUB SERPL-MCNC: 0.3 MG/DL (ref 0.1–1)
BNP SERPL-MCNC: 22 PG/ML (ref 0–99)
BUN SERPL-MCNC: 31 MG/DL (ref 6–30)
BUN SERPL-MCNC: 33 MG/DL (ref 6–20)
CALCIUM SERPL-MCNC: 8.6 MG/DL (ref 8.7–10.5)
CHLORIDE SERPL-SCNC: 108 MMOL/L (ref 95–110)
CHLORIDE SERPL-SCNC: 108 MMOL/L (ref 95–110)
CO2 SERPL-SCNC: 23 MMOL/L (ref 23–29)
CREAT SERPL-MCNC: 1.1 MG/DL (ref 0.5–1.4)
CREAT SERPL-MCNC: 1.4 MG/DL (ref 0.5–1.4)
ERYTHROCYTE [DISTWIDTH] IN BLOOD BY AUTOMATED COUNT: 12.5 % (ref 11.5–14.5)
GFR SERPLBLD CREATININE-BSD FMLA CKD-EPI: >60 ML/MIN/1.73/M2
GLUCOSE SERPL-MCNC: 104 MG/DL (ref 70–110)
GLUCOSE SERPL-MCNC: 110 MG/DL (ref 70–110)
HCT VFR BLD AUTO: 38.6 % (ref 40–54)
HCT VFR BLD CALC: 42 %PCV (ref 36–54)
HGB BLD-MCNC: 13 GM/DL (ref 14–18)
IMM GRANULOCYTES # BLD AUTO: 0.05 K/UL (ref 0–0.04)
IMM GRANULOCYTES NFR BLD AUTO: 0.5 % (ref 0–0.5)
LYMPHOCYTES # BLD AUTO: 2.5 K/UL (ref 1–4.8)
MCH RBC QN AUTO: 30.9 PG (ref 27–31)
MCHC RBC AUTO-ENTMCNC: 33.7 G/DL (ref 32–36)
MCV RBC AUTO: 92 FL (ref 82–98)
NUCLEATED RBC (/100WBC) (OHS): 0 /100 WBC
PLATELET # BLD AUTO: 244 K/UL (ref 150–450)
PMV BLD AUTO: 9.5 FL (ref 9.2–12.9)
POC IONIZED CALCIUM: 1.17 MMOL/L (ref 1.06–1.42)
POC TCO2 (MEASURED): 23 MMOL/L (ref 23–29)
POTASSIUM BLD-SCNC: 3.8 MMOL/L (ref 3.5–5.1)
POTASSIUM SERPL-SCNC: 3.8 MMOL/L (ref 3.5–5.1)
PROT SERPL-MCNC: 6.2 GM/DL (ref 6–8.4)
RBC # BLD AUTO: 4.21 M/UL (ref 4.6–6.2)
RELATIVE EOSINOPHIL (OHS): 2.3 %
RELATIVE LYMPHOCYTE (OHS): 26.9 % (ref 18–48)
RELATIVE MONOCYTE (OHS): 7.9 % (ref 4–15)
RELATIVE NEUTROPHIL (OHS): 61.9 % (ref 38–73)
SAMPLE: ABNORMAL
SODIUM BLD-SCNC: 143 MMOL/L (ref 136–145)
SODIUM SERPL-SCNC: 142 MMOL/L (ref 136–145)
TROPONIN I SERPL HS-MCNC: <3 NG/L
WBC # BLD AUTO: 9.28 K/UL (ref 3.9–12.7)

## 2025-04-15 PROCEDURE — 93010 ELECTROCARDIOGRAM REPORT: CPT | Mod: ,,, | Performed by: INTERNAL MEDICINE

## 2025-04-15 PROCEDURE — 84484 ASSAY OF TROPONIN QUANT: CPT | Performed by: EMERGENCY MEDICINE

## 2025-04-15 PROCEDURE — 99285 EMERGENCY DEPT VISIT HI MDM: CPT | Mod: 25

## 2025-04-15 PROCEDURE — 85025 COMPLETE CBC W/AUTO DIFF WBC: CPT | Performed by: EMERGENCY MEDICINE

## 2025-04-15 PROCEDURE — 25000003 PHARM REV CODE 250: Performed by: EMERGENCY MEDICINE

## 2025-04-15 PROCEDURE — 93005 ELECTROCARDIOGRAM TRACING: CPT

## 2025-04-15 PROCEDURE — 83880 ASSAY OF NATRIURETIC PEPTIDE: CPT | Performed by: EMERGENCY MEDICINE

## 2025-04-15 PROCEDURE — 25000003 PHARM REV CODE 250

## 2025-04-15 PROCEDURE — 94761 N-INVAS EAR/PLS OXIMETRY MLT: CPT

## 2025-04-15 PROCEDURE — 80047 BASIC METABLC PNL IONIZED CA: CPT

## 2025-04-15 PROCEDURE — 80053 COMPREHEN METABOLIC PANEL: CPT | Performed by: EMERGENCY MEDICINE

## 2025-04-15 PROCEDURE — 82330 ASSAY OF CALCIUM: CPT | Mod: 59

## 2025-04-15 RX ORDER — CLOPIDOGREL BISULFATE 75 MG/1
75 TABLET ORAL DAILY
Qty: 30 TABLET | Refills: 0 | Status: SHIPPED | OUTPATIENT
Start: 2025-04-15 | End: 2026-04-15

## 2025-04-15 RX ORDER — ASPIRIN 325 MG
325 TABLET ORAL
Status: COMPLETED | OUTPATIENT
Start: 2025-04-15 | End: 2025-04-15

## 2025-04-15 RX ORDER — QUETIAPINE FUMARATE 100 MG/1
100 TABLET, FILM COATED ORAL NIGHTLY
Qty: 30 TABLET | Refills: 0 | Status: SHIPPED | OUTPATIENT
Start: 2025-04-15 | End: 2026-04-15

## 2025-04-15 RX ORDER — QUETIAPINE FUMARATE 100 MG/1
100 TABLET, FILM COATED ORAL ONCE
Status: COMPLETED | OUTPATIENT
Start: 2025-04-15 | End: 2025-04-15

## 2025-04-15 RX ADMIN — ASPIRIN 325 MG: 325 TABLET ORAL at 07:04

## 2025-04-15 RX ADMIN — QUETIAPINE FUMARATE 100 MG: 100 TABLET ORAL at 08:04

## 2025-04-16 LAB
OHS QRS DURATION: 82 MS
OHS QTC CALCULATION: 435 MS

## 2025-04-16 NOTE — ED NOTES
I-STAT Chem-8+ Results:   Value Reference Range   Sodium 134 136-145 mmol/L   Potassium  3.8 3.5-5.1 mmol/L   Chloride 108  mmol/L   Ionized Calcium 1.17 1.06-1.42 mmol/L   CO2 (measured) 23 23-29 mmol/L   Glucose 110  mg/dL   BUN 31 6-30 mg/dL   Creatinine 1.4 0.5-1.4 mg/dL   Hematocrit 42 36-54%

## 2025-04-16 NOTE — FIRST PROVIDER EVALUATION
"Medical screening examination initiated.  I have conducted a focused provider triage encounter, findings are as follows:    Brief history of present illness:  49-year-old male with a history of CAD, left apical thrombus, hypertension, opiate abuse presenting with anxiety, chest pain across his chest wall that began this morning.  He denies any exertional chest pain reports feeling lightheaded, occasional shortness of breath, nausea without emesis.  He also reports diarrhea but denies any dysuria, hematochezia, melena or hemoptysis.  Brought in via EMS, 12 lead showing ST changes but no criteria for STEMI.    Vitals:    04/15/25 1851   BP: 111/71   Pulse: 93   Resp: 16   Temp: 97 °F (36.1 °C)   TempSrc: Oral   SpO2: 96%   Weight: 81.6 kg (180 lb)   Height: 5' 4" (1.626 m)       Pertinent physical exam:  Anxious appearing, no active chest pain but reports feeling nausea at times    Brief workup plan:  ECG, chest pain workup, chest x-ray    Preliminary workup initiated; this workup will be continued and followed by the physician or advanced practice provider that is assigned to the patient when roomed.    Davion Zamudio DO, ADIEL  Emergency Staff Physician   Dept of Emergency Medicine   [facility]  Spectralink: [id number]        Disclaimer: This note has been generated using voice-recognition software. There may be typographical errors that have been missed during proof-reading.    "

## 2025-04-16 NOTE — ED PROVIDER NOTES
"Encounter Date: 4/15/2025       History     Chief Complaint   Patient presents with    Chest Pain    Anxiety     Pt out of eliquis for 1 weeks. Pt had 2 of nitro. Chest pain started yesterday. Hx of 3 stents.      Ganesh Estrada is a 49 year old male with CAD (h/o 3 stents) HTN, anxiety, and recent psychiatric hospitalizations who presents to the ED complaining of worsening anxiety and associated chest pain.  He reports his anxiety has been progressively worsening over the past several weeks to months.  He notes that his chest pain is associated with his anxiety.  Patient also endorses feeling restless and unable to sit still due to his anxiety.  He denies any jaw pain but does note some left arm pain.  Denies sweating, nausea, or vomiting.  Patient notes that his chest pain does improve with palpation of the area on his chest that hurts, but the pain returns afterwards.     Per chart review, patient has been seen recently for auditory and visual hallucinations.  Denies any auditory or visual hallucinations currently.  He does state that he is having an "spiritual wakening" and that he "is picking up on people's energy" which worsens his anxiety.  Patient denies any alcohol or illicit substance use other than smoking marijuana occasionally, which he states no longer is helping with his anxiety.        Review of patient's allergies indicates:  No Known Allergies  Past Medical History:   Diagnosis Date    Arthritis      Past Surgical History:   Procedure Laterality Date    CARDIAC SURGERY       No family history on file.  Social History[1]  Review of Systems    Physical Exam     Initial Vitals [04/15/25 1851]   BP Pulse Resp Temp SpO2   111/71 93 16 97 °F (36.1 °C) 96 %      MAP       --         Physical Exam    Vitals reviewed.  Constitutional: He appears well-developed and well-nourished. He is not diaphoretic. He appears distressed.   HENT:   Head: Normocephalic and atraumatic.   Eyes: Conjunctivae and EOM are " normal. Pupils are equal, round, and reactive to light. No scleral icterus.   Neck:   Normal range of motion.  Cardiovascular:  Normal rate, regular rhythm, normal heart sounds and intact distal pulses.           Pulmonary/Chest: Breath sounds normal. No respiratory distress. He has no wheezes.   Abdominal: Abdomen is soft. He exhibits distension. There is no abdominal tenderness. There is no rebound.   Musculoskeletal:         General: No edema. Normal range of motion.      Cervical back: Normal range of motion.     Neurological: He is alert and oriented to person, place, and time. No cranial nerve deficit. GCS score is 15. GCS eye subscore is 4. GCS verbal subscore is 5. GCS motor subscore is 6.   Skin: Skin is warm and dry.   Psychiatric: His mood appears anxious. His affect is labile. His affect is not angry. His speech is tangential. He is hyperactive. He is not agitated, not aggressive and not actively hallucinating. He expresses no homicidal and no suicidal ideation. He expresses no suicidal plans and no homicidal plans.         ED Course   Procedures  Labs Reviewed   COMPREHENSIVE METABOLIC PANEL - Abnormal       Result Value    Sodium 142      Potassium 3.8      Chloride 108      CO2 23      Glucose 104      BUN 33 (*)     Creatinine 1.1      Calcium 8.6 (*)     Protein Total 6.2      Albumin 3.2 (*)     Bilirubin Total 0.3      ALP 57      AST 23      ALT 24      Anion Gap 11      eGFR >60     CBC WITH DIFFERENTIAL - Abnormal    WBC 9.28      RBC 4.21 (*)     HGB 13.0 (*)     HCT 38.6 (*)     MCV 92      MCH 30.9      MCHC 33.7      RDW 12.5      Platelet Count 244      MPV 9.5      Nucleated RBC 0      Neut % 61.9      Lymph % 26.9      Mono % 7.9      Eos % 2.3      Basophil % 0.5      Imm Grans % 0.5      Neut # 5.74      Lymph # 2.50      Mono # 0.73      Eos # 0.21      Baso # 0.05      Imm Grans # 0.05 (*)    ISTAT PROCEDURE - Abnormal    POC Glucose 110      POC BUN 31 (*)     POC Creatinine 1.4       POC Sodium 143      POC Potassium 3.8      POC Chloride 108      POC TCO2 (MEASURED) 23      POC Ionized Calcium 1.17      POC Hematocrit 42      Sample AUBREY     TROPONIN I HIGH SENSITIVITY - Normal    Troponin High Sensitive <3     TROPONIN I HIGH SENSITIVITY - Normal    Troponin High Sensitive <3     B-TYPE NATRIURETIC PEPTIDE - Normal    BNP 22     TROPONIN I HIGH SENSITIVITY - Normal    Troponin High Sensitive <3     CBC W/ AUTO DIFFERENTIAL    Narrative:     The following orders were created for panel order CBC auto differential.  Procedure                               Abnormality         Status                     ---------                               -----------         ------                     CBC with Differential[[id number]]       Abnormal            Final result                 Please view results for these tests on the individual orders.     EKG Readings: (Independently Interpreted)   Initial Reading: No STEMI. Rhythm: Normal Sinus Rhythm. Heart Rate: 76. ST Segments: Normal ST Segments. T Waves Flipped: V2, V3 and V4. Axis: Normal.       Imaging Results              X-Ray Chest AP Portable (In process)                      Medications   aspirin tablet 325 mg (325 mg Oral Given 4/15/25 1938)   QUEtiapine tablet 100 mg (100 mg Oral Given 4/15/25 2019)     Medical Decision Making  Patient is a 49-year-old male with CAD who presents with anxiety and associated chest pain that has been progressively worsening over the past several weeks to months.  Differential diagnosis includes but is not limited to anxiety, MI, and costochondritis.  Patient's chest pain is reportedly relieved with firm palpation and returns upon letting go, and he also denies coughing, which makes consciousness though chondritis unlikely.  We will obtain EKG and troponin to evaluate for MI, although given the description of his symptoms, their chronicity, and association with anxiety it is unlikely that this is an acute myocardial  "event.  The patient is hyperactive, visibly anxious on exam, and reports undergoing a "spiritual awakening" that is contributing to his anxiety.  Patient given his nightly dose of Seroquel 100 mg to help with his anxiety. Initial troponin negative. EKG and second troponin pending at time of sign out. Dr. Ash will continue care of the patient at this time.     Amount and/or Complexity of Data Reviewed  Labs:  Decision-making details documented in ED Course.    Risk  Prescription drug management.      Additional MDM:   Heart Score:    History:          Slightly suspicious.  ECG:             Normal  Age:               45-65 years  Risk factors: >= 3 risk factors or history of atherosclerotic disease  Troponin:       Less than or equal to normal limit  Heart Score = 3               Attending Attestation:   Physician Attestation Statement for Resident:  As the supervising MD   Physician Attestation Statement: I have personally seen and examined this patient.   I agree with the above history.  -: 50 yo M with pmhx CAD with stenting, HFrEF (EF 25%), hx of LA thrombus, discharged from psych facility today presents with a chief complaint of chest pain.  Patient notes he has been suffering from intermittent waxing and waning chest pain for the past 3 weeks.  He notes it worsens when his anxiety access.  He was discharged from the psych facility and apparently was a bit more anxious when he spoke with the resident but I evaluated him after he received his home dose of Seroquel and he was very linear and oriented.  He denies any SI, HI, AVH.  He was discharged on Seroquel from the psych facility.  He notes that the chest pain has improved to "slight pressure, twinges".  No immobilization or recent travel.  No history of VTE.  Chest pain does not radiate to the back.  No cough.  No fever.  Initial troponin was normal.  Patient has no risk factors for PE, no signs of DVT, doubt acute PE.  Well appearance and reassuring " hemodynamics are inconsistent with cardiac tamponade or aortic dissection.  Repeat EKG obtained at 9:36 p.m. without acute ischemic changes.  Patient's history of CAD is concerning but he has a reassuring HEART score of 3. Rx for seroquel and plavix was sent to his pharmacy.  Chest x-ray on my independent interpretation is negative for any acute abnormalities.  Repeat troponin was normal.  On repeat assessment, he remains resting comfortably and notes pain resolved.  Do not suspect there is any acute coronary syndrome as the etiology of his pain with a reassuring serum labs and presentation notes previous.  He was encouraged the importance of compliance with his Seroquel and Plavix.  He was encouraged to establish a PCP for a stress test and he is comfortable with that plan.  Provided with extensive return precautions.   As the supervising MD I agree with the above PE.     As the supervising MD I agree with the above treatment, course, plan, and disposition.                    ED Course as of 04/15/25 2224   Tue Apr 15, 2025   1935 BP: 111/71 [DY]   1935 Pulse: 93 [DY]   1935 SpO2: 96 % [DY]   1935 Resp: 16 [DY]   2001 Hemoglobin(!): 13.0 [DY]   2002 Troponin I High Sensitivity: <3 [DY]   2002 BNP: 22 [DY]      ED Course User Index  [DY] Rony Al MD                           Clinical Impression:  Final diagnoses:  [R07.9] Chest pain (Primary)          ED Disposition Condition    Discharge Stable          ED Prescriptions       Medication Sig Dispense Start Date End Date Auth. Provider    QUEtiapine (SEROQUEL) 100 MG Tab Take 1 tablet (100 mg total) by mouth every evening. 30 tablet 4/15/2025 4/15/2026 Rian Ash MD    clopidogreL (PLAVIX) 75 mg tablet Take 1 tablet (75 mg total) by mouth once daily. 30 tablet 4/15/2025 4/15/2026 Rian Ash MD          Follow-up Information       Follow up With Specialties Details Why Contact [facility] Emergency Dept Emergency Medicine  As needed, If  symptoms worsen [address]  [facility] [address] [phone]               Rony Al MD  Resident  04/15/25 2056         [1]   Social History  Tobacco Use    Smoking status: Every Day     Current packs/day: 2.00     Average packs/day: 2.0 packs/day for 43.3 years (86.6 ttl pk-yrs)     Types: Cigarettes     Start date: 1982    Smokeless tobacco: Never    Tobacco comments:     Pt declines enrollment in [facility]. Handout provided for Ambulatory Smoking Cessation program.l   Substance Use Topics    Alcohol use: Not Currently    Drug use: Never        Rian Ash MD  04/15/25 8736

## 2025-04-16 NOTE — ED TRIAGE NOTES
"Ganesh Estrada, a 49 y.o. male presents to the ED w/ complaint of chest pain and anxiety. Pt states he recently had " a spiritual epiphany and it has made him anxious"    Triage note:  Chief Complaint   Patient presents with    Chest Pain    Anxiety     Pt out of eliquis for 1 weeks. Pt had 2 of nitro. Chest pain started yesterday. Hx of 3 stents.      Review of patient's allergies indicates:  No Known Allergies  Past Medical History:   Diagnosis Date    Arthritis       "

## 2025-04-16 NOTE — DISCHARGE INSTRUCTIONS
As explained, a refill was provided for your seroquel and clopidogrel and was sent to your pharmacy of record.  Further prescriptions must be prescribed by your primary care doctor.    You may discuss your chest pain with your primary care doctor.    Return to the ER for any worsening symptoms.

## 2025-04-19 LAB
OHS QRS DURATION: 82 MS
OHS QRS DURATION: 86 MS
OHS QTC CALCULATION: 448 MS
OHS QTC CALCULATION: 463 MS

## 2025-04-28 NOTE — SUBJECTIVE & OBJECTIVE
Past Medical History:   Diagnosis Date    Arthritis        Past Surgical History:   Procedure Laterality Date    CARDIAC SURGERY         Review of patient's allergies indicates:  No Known Allergies    No current facility-administered medications on file prior to encounter.     Current Outpatient Medications on File Prior to Encounter   Medication Sig    aspirin (ECOTRIN) 81 MG EC tablet Take 1 tablet (81 mg total) by mouth once daily.    atorvastatin (LIPITOR) 80 MG tablet Take 80 mg by mouth once daily.    clopidogrel (PLAVIX) 75 mg tablet Take 75 mg by mouth once daily.    HYDROcodone-acetaminophen (NORCO) 5-325 mg per tablet Take 1 tablet by mouth every 6 (six) hours as needed for Pain.    ibuprofen (ADVIL,MOTRIN) 600 MG tablet Take 1 tablet (600 mg total) by mouth every 6 (six) hours as needed for Pain.    lidocaine (LIDODERM) 5 % Place 1 patch onto the skin once daily. Remove & Discard patch within 12 hours or as directed by MD    lisinopril (PRINIVIL,ZESTRIL) 2.5 MG tablet Take 2.5 mg by mouth once daily.    metoprolol succinate (TOPROL-XL) 25 MG 24 hr tablet Take 25 mg by mouth once daily. Take 1/2 A tablet once daily    nitroGLYCERIN (NITROSTAT) 0.3 MG SL tablet Place 0.3 mg under the tongue every 5 (five) minutes as needed for Chest pain.    warfarin (COUMADIN) 5 MG tablet Take 5 mg by mouth once daily.     Family History    None       Tobacco Use    Smoking status: Every Day     Current packs/day: 2.00     Average packs/day: 2.0 packs/day for 41.6 years (83.2 ttl pk-yrs)     Types: Cigarettes     Start date: 1982    Smokeless tobacco: Never    Tobacco comments:     Pt declines enrollment in [employer] [facility]. Handout provided for Ambulatory Smoking Cessation program.l   Substance and Sexual Activity    Alcohol use: Not Currently    Drug use: Never    Sexual activity: Not Currently     Review of Systems   Constitutional: Negative for fever.   HENT:  Negative for ear pain.    Eyes:  Negative for double  vision.   Cardiovascular:  Positive for chest pain. Negative for palpitations.   Respiratory:  Negative for shortness of breath.    Endocrine: Negative for heat intolerance.   Hematologic/Lymphatic: Negative for adenopathy.   Skin:  Negative for dry skin.   Musculoskeletal:  Negative for falls.   Gastrointestinal:  Negative for anorexia.   Genitourinary:  Negative for flank pain.   Neurological:  Negative for disturbances in coordination.   Psychiatric/Behavioral:  Negative for depression.      Objective:     Vital Signs (Most Recent):  Temp: 97.9 °F (36.6 °C) (08/06/23 1507)  Pulse: 75 (08/06/23 1657)  Resp: 15 (08/06/23 1624)  BP: 117/75 (08/06/23 1657)  SpO2: 98 % (08/06/23 1657) Vital Signs (24h Range):  Temp:  [97.9 °F (36.6 °C)] 97.9 °F (36.6 °C)  Pulse:  [] 75  Resp:  [15-20] 15  SpO2:  [96 %-98 %] 98 %  BP: (112-132)/(75-90) 117/75     Weight: 89.4 kg (197 lb)  Body mass index is 30.85 kg/m².    SpO2: 98 %       No intake or output data in the 24 hours ending 08/06/23 1733    Lines/Drains/Airways       Peripheral Intravenous Line  Duration                  Peripheral IV - Single Lumen 08/06/23 1630 18 G Right;Posterior Hand <1 day                     Physical Exam  HENT:      Head: Normocephalic.      Mouth/Throat:      Mouth: Mucous membranes are moist.   Cardiovascular:      Rate and Rhythm: Normal rate and regular rhythm.   Pulmonary:      Effort: Pulmonary effort is normal.   Abdominal:      General: There is distension.      Palpations: Abdomen is soft.   Neurological:      Mental Status: He is alert.   Psychiatric:         Mood and Affect: Mood normal.             bed rails

## 2025-06-02 ENCOUNTER — PATIENT MESSAGE (OUTPATIENT)
Dept: ADMINISTRATIVE | Facility: HOSPITAL | Age: 50
End: 2025-06-02
Payer: MEDICAID

## 2025-08-04 ENCOUNTER — HOSPITAL ENCOUNTER (EMERGENCY)
Facility: HOSPITAL | Age: 50
Discharge: HOME | End: 2025-08-04
Attending: EMERGENCY MEDICINE
Payer: MEDICAID

## 2025-08-04 VITALS
OXYGEN SATURATION: 96 % | RESPIRATION RATE: 18 BRPM | BODY MASS INDEX: 30.73 KG/M2 | SYSTOLIC BLOOD PRESSURE: 145 MMHG | DIASTOLIC BLOOD PRESSURE: 102 MMHG | WEIGHT: 180 LBS | TEMPERATURE: 98 F | HEIGHT: 64 IN | HEART RATE: 85 BPM

## 2025-08-04 DIAGNOSIS — I25.119 CORONARY ARTERY DISEASE INVOLVING NATIVE CORONARY ARTERY OF NATIVE HEART WITH ANGINA PECTORIS: ICD-10-CM

## 2025-08-04 DIAGNOSIS — R07.9 CHEST PAIN: ICD-10-CM

## 2025-08-04 DIAGNOSIS — N20.0 NEPHROLITHIASIS: Primary | ICD-10-CM

## 2025-08-04 DIAGNOSIS — F41.9 ANXIETY: ICD-10-CM

## 2025-08-04 LAB
ABSOLUTE EOSINOPHIL (OHS): 0.09 K/UL
ABSOLUTE MONOCYTE (OHS): 0.67 K/UL (ref 0.3–1)
ABSOLUTE NEUTROPHIL COUNT (OHS): 6.56 K/UL (ref 1.8–7.7)
ALBUMIN SERPL BCP-MCNC: 4.1 G/DL (ref 3.5–5.2)
ALP SERPL-CCNC: 60 UNIT/L (ref 40–150)
ALT SERPL W/O P-5'-P-CCNC: 36 UNIT/L (ref 0–55)
AMPHET UR QL SCN: NEGATIVE
ANION GAP (OHS): 11 MMOL/L (ref 8–16)
AST SERPL-CCNC: 22 UNIT/L (ref 0–50)
BACTERIA #/AREA URNS AUTO: NORMAL /HPF
BARBITURATE SCN PRESENT UR: NEGATIVE
BASOPHILS # BLD AUTO: 0.05 K/UL
BASOPHILS NFR BLD AUTO: 0.5 %
BENZODIAZ UR QL SCN: ABNORMAL
BILIRUB SERPL-MCNC: 0.5 MG/DL (ref 0.1–1)
BILIRUB UR QL STRIP.AUTO: NEGATIVE
BIPAP: 0
BUN SERPL-MCNC: 23 MG/DL (ref 6–20)
CALCIUM SERPL-MCNC: 9 MG/DL (ref 8.7–10.5)
CANNABINOIDS UR QL SCN: ABNORMAL
CHLORIDE SERPL-SCNC: 108 MMOL/L (ref 95–110)
CLARITY UR: CLEAR
CO2 SERPL-SCNC: 21 MMOL/L (ref 23–29)
COCAINE UR QL SCN: NEGATIVE
COLOR UR AUTO: YELLOW
CREAT SERPL-MCNC: 1.2 MG/DL (ref 0.5–1.4)
CREAT UR-MCNC: 185 MG/DL (ref 23–375)
ERYTHROCYTE [DISTWIDTH] IN BLOOD BY AUTOMATED COUNT: 12.7 % (ref 11.5–14.5)
FIO2: 21 %
GFR SERPLBLD CREATININE-BSD FMLA CKD-EPI: >60 ML/MIN/1.73/M2
GLUCOSE SERPL-MCNC: 93 MG/DL (ref 70–110)
GLUCOSE UR QL STRIP: NEGATIVE
HCT VFR BLD AUTO: 44.7 % (ref 40–54)
HCV AB SERPL QL IA: NORMAL
HGB BLD-MCNC: 15.4 GM/DL (ref 14–18)
HGB UR QL STRIP: NEGATIVE
HIV 1+2 AB+HIV1 P24 AG SERPL QL IA: NORMAL
HYALINE CASTS UR QL AUTO: 1 /LPF (ref 0–1)
IMM GRANULOCYTES # BLD AUTO: 0.06 K/UL (ref 0–0.04)
IMM GRANULOCYTES NFR BLD AUTO: 0.6 % (ref 0–0.5)
KETONES UR QL STRIP: NEGATIVE
LDH SERPL L TO P-CCNC: 1.7 MMOL/L (ref 0.5–2.2)
LEUKOCYTE ESTERASE UR QL STRIP: NEGATIVE
LYMPHOCYTES # BLD AUTO: 2.25 K/UL (ref 1–4.8)
MCH RBC QN AUTO: 31 PG (ref 27–31)
MCHC RBC AUTO-ENTMCNC: 34.5 G/DL (ref 32–36)
MCV RBC AUTO: 90 FL (ref 82–98)
METHADONE UR QL SCN: NEGATIVE
MICROSCOPIC COMMENT: NORMAL
NITRITE UR QL STRIP: NEGATIVE
NT-PROBNP SERPL-MCNC: 154 PG/ML
NUCLEATED RBC (/100WBC) (OHS): 0 /100 WBC
OPIATES UR QL SCN: NEGATIVE
PCP UR QL: NEGATIVE
PH UR STRIP: 7 [PH]
PLATELET # BLD AUTO: 334 K/UL (ref 150–450)
PMV BLD AUTO: 9 FL (ref 9.2–12.9)
POC PERFORMED BY: NORMAL
POC TEMPERATURE: 37 C
POTASSIUM SERPL-SCNC: 3.9 MMOL/L (ref 3.5–5.1)
PROT SERPL-MCNC: 7.2 GM/DL (ref 6–8.4)
PROT UR QL STRIP: ABNORMAL
RBC # BLD AUTO: 4.97 M/UL (ref 4.6–6.2)
RBC #/AREA URNS AUTO: 2 /HPF (ref 0–4)
RELATIVE EOSINOPHIL (OHS): 0.9 %
RELATIVE LYMPHOCYTE (OHS): 23.2 % (ref 18–48)
RELATIVE MONOCYTE (OHS): 6.9 % (ref 4–15)
RELATIVE NEUTROPHIL (OHS): 67.9 % (ref 38–73)
SODIUM SERPL-SCNC: 140 MMOL/L (ref 136–145)
SP GR UR STRIP: >=1.03
SPECIMEN SOURCE: NORMAL
TROPONIN I SERPL HS-MCNC: <3 NG/L
UROBILINOGEN UR STRIP-ACNC: NEGATIVE EU/DL
WBC # BLD AUTO: 9.68 K/UL (ref 3.9–12.7)
WBC #/AREA URNS AUTO: <1 /HPF (ref 0–5)

## 2025-08-04 PROCEDURE — 93005 ELECTROCARDIOGRAM TRACING: CPT

## 2025-08-04 PROCEDURE — 87389 HIV-1 AG W/HIV-1&-2 AB AG IA: CPT | Performed by: PHYSICIAN ASSISTANT

## 2025-08-04 PROCEDURE — 99285 EMERGENCY DEPT VISIT HI MDM: CPT | Mod: 25

## 2025-08-04 PROCEDURE — 83880 ASSAY OF NATRIURETIC PEPTIDE: CPT | Performed by: EMERGENCY MEDICINE

## 2025-08-04 PROCEDURE — 85025 COMPLETE CBC W/AUTO DIFF WBC: CPT | Performed by: EMERGENCY MEDICINE

## 2025-08-04 PROCEDURE — 84484 ASSAY OF TROPONIN QUANT: CPT | Performed by: EMERGENCY MEDICINE

## 2025-08-04 PROCEDURE — 25500020 PHARM REV CODE 255: Performed by: EMERGENCY MEDICINE

## 2025-08-04 PROCEDURE — 81001 URINALYSIS AUTO W/SCOPE: CPT | Mod: XB

## 2025-08-04 PROCEDURE — 83605 ASSAY OF LACTIC ACID: CPT

## 2025-08-04 PROCEDURE — 80307 DRUG TEST PRSMV CHEM ANLYZR: CPT | Performed by: EMERGENCY MEDICINE

## 2025-08-04 PROCEDURE — 99900035 HC TECH TIME PER 15 MIN (STAT)

## 2025-08-04 PROCEDURE — 25000242 PHARM REV CODE 250 ALT 637 W/ HCPCS: Performed by: EMERGENCY MEDICINE

## 2025-08-04 PROCEDURE — 93010 ELECTROCARDIOGRAM REPORT: CPT | Mod: ,,, | Performed by: INTERNAL MEDICINE

## 2025-08-04 PROCEDURE — 25000003 PHARM REV CODE 250

## 2025-08-04 PROCEDURE — 25000003 PHARM REV CODE 250: Performed by: EMERGENCY MEDICINE

## 2025-08-04 PROCEDURE — 84132 ASSAY OF SERUM POTASSIUM: CPT | Performed by: EMERGENCY MEDICINE

## 2025-08-04 PROCEDURE — 86803 HEPATITIS C AB TEST: CPT | Performed by: PHYSICIAN ASSISTANT

## 2025-08-04 RX ORDER — LORAZEPAM 1 MG/1
1 TABLET ORAL
Status: COMPLETED | OUTPATIENT
Start: 2025-08-04 | End: 2025-08-04

## 2025-08-04 RX ORDER — NITROGLYCERIN 0.4 MG/1
0.4 TABLET SUBLINGUAL EVERY 5 MIN PRN
Status: DISCONTINUED | OUTPATIENT
Start: 2025-08-04 | End: 2025-08-05 | Stop reason: HOSPADM

## 2025-08-04 RX ORDER — ASPIRIN 81 MG/1
81 TABLET ORAL DAILY
Qty: 30 TABLET | Refills: 5 | Status: SHIPPED | OUTPATIENT
Start: 2025-08-04 | End: 2026-08-04

## 2025-08-04 RX ORDER — ASPIRIN 325 MG
325 TABLET ORAL
Status: COMPLETED | OUTPATIENT
Start: 2025-08-04 | End: 2025-08-04

## 2025-08-04 RX ORDER — NITROGLYCERIN 0.4 MG/1
TABLET SUBLINGUAL
Status: COMPLETED
Start: 2025-08-04 | End: 2025-08-04

## 2025-08-04 RX ORDER — CLOPIDOGREL BISULFATE 75 MG/1
75 TABLET ORAL DAILY
Qty: 30 TABLET | Refills: 0 | Status: SHIPPED | OUTPATIENT
Start: 2025-08-04 | End: 2026-08-04

## 2025-08-04 RX ADMIN — IOHEXOL 75 ML: 350 INJECTION, SOLUTION INTRAVENOUS at 07:08

## 2025-08-04 RX ADMIN — NITROGLYCERIN 0.4 MG: 0.4 TABLET, ORALLY DISINTEGRATING SUBLINGUAL at 06:08

## 2025-08-04 RX ADMIN — LORAZEPAM 1 MG: 1 TABLET ORAL at 06:08

## 2025-08-04 RX ADMIN — ASPIRIN 325 MG ORAL TABLET 325 MG: 325 PILL ORAL at 06:08

## 2025-08-04 NOTE — ED NOTES
After EKG, walked out into parking lot states nervous, returned to er for repeat EKG, syncopal episode, code stemi called per dr galdamez on same  ekg

## 2025-08-04 NOTE — ED PROVIDER NOTES
Encounter Date: 8/4/2025       History     Chief Complaint   Patient presents with    Chest Pain    multiple complaints     Has stents     Ganesh Estrada is a 50 y.o. male with a history of coronary artery disease status post stenting 3 years ago, last taken Plavix and aspirin 3 days ago as he ran out of his medications, nephrolithiasis, fatty liver disease, hypertension, and anxiety who presents with chest pain and upper abdominal pain.  Patient states the pain started 3 days ago, he describes the chest pain to be in his left anterior chest wall and radiates up his left neck and into his upper back.  He denies any pain between the scapula.  He also notes that he has a pulsating sensation in his upper belly, primarily in his stomach.  He denies any pain radiating through to his lower back.  Patient rates his pain 10/10 currently, has not taken any medications for it.  Patient does request a medication for his anxiety.  Patient at this point also states that he feels like he is unable to sit still or find a position of comfort.  Patient denies any shortness of breath, fevers, chills, headaches, nausea, vomiting, diarrhea, constipation, dysuria, hematuria, or any other symptoms at this time.      The history is provided by the patient. No  was used.     Review of patient's allergies indicates:  No Known Allergies  Past Medical History:   Diagnosis Date    Arthritis      Past Surgical History:   Procedure Laterality Date    CARDIAC SURGERY       No family history on file.  Social History[1]  Review of Systems  A complete ROS is negative unless otherwise stated in HPI    Physical Exam     Initial Vitals [08/04/25 1750]   BP Pulse Resp Temp SpO2   135/88 100 18 98.3 °F (36.8 °C) 99 %      MAP       --         Physical Exam    Nursing note and vitals reviewed.  Constitutional: He appears well-developed and well-nourished. He is not diaphoretic. He appears distressed.   HENT:   Head: Normocephalic and  atraumatic.   Eyes: EOM are normal. Right eye exhibits no discharge. Left eye exhibits no discharge.   Neck: Neck supple. No tracheal deviation present. No JVD present.   Normal range of motion.  Cardiovascular:  Normal rate and regular rhythm.     Exam reveals no gallop and no friction rub.       No murmur heard.  Tachycardia   Pulmonary/Chest: Breath sounds normal. No respiratory distress. He has no wheezes. He has no rhonchi. He has no rales. He exhibits no tenderness.   Abdominal: Abdomen is soft. He exhibits no distension. There is no abdominal tenderness.   There is left-sided CVA tenderness, otherwise there is no general abdominal pain    Musculoskeletal:      Cervical back: Normal range of motion and neck supple.     Neurological: He is alert. He has normal strength. No cranial nerve deficit.   Patient is agitated, appears anxious         ED Course   Procedures  Labs Reviewed   COMPREHENSIVE METABOLIC PANEL - Abnormal       Result Value    Sodium 140      Potassium 3.9      Chloride 108      CO2 21 (*)     Glucose 93      BUN 23 (*)     Creatinine 1.2      Calcium 9.0      Protein Total 7.2      Albumin 4.1      Bilirubin Total 0.5      ALP 60      AST 22      ALT 36      Anion Gap 11      eGFR >60     DRUG SCREEN PANEL, URINE EMERGENCY - Abnormal    Benzodiazepine, Urine Presumptive Positive (*)     Methadone, Urine Negative      Cocaine, Urine Negative      Opiates, Urine Negative      Barbiturates, Urine Negative      Amphetamines, Urine Negative      THC Presumptive Positive (*)     Phencyclidine, Urine Negative      Urine Creatinine 185.0      Narrative:     This screen includes the following classes of drugs at the listed cut-off:     Benzodiazepines:        200 ng/ml   Methadone:              300 ng/ml   Cocaine metabolite:     300 ng/ml   Opiates:                300 ng/ml   Barbiturates:           200 ng/ml   Amphetamines:           1000 ng/ml   Marijuana metabs (THC): 50 ng/ml   Phencyclidine (PCP):  "   25 ng/ml     This is a screening test. If results do not correlate with clinical presentation, then a confirmatory send out test is advised.    This report is intended for use in clinical monitoring and management of patients. It is not intended for use in employment related drug testing."   CBC WITH DIFFERENTIAL - Abnormal    WBC 9.68      RBC 4.97      HGB 15.4      HCT 44.7      MCV 90      MCH 31.0      MCHC 34.5      RDW 12.7      Platelet Count 334      MPV 9.0 (*)     Nucleated RBC 0      Neut % 67.9      Lymph % 23.2      Mono % 6.9      Eos % 0.9      Basophil % 0.5      Imm Grans % 0.6 (*)     Neut # 6.56      Lymph # 2.25      Mono # 0.67      Eos # 0.09      Baso # 0.05      Imm Grans # 0.06 (*)    HEPATITIS C ANTIBODY - Normal    Hep C Ab Interp Non-Reactive     HIV 1 / 2 ANTIBODY - Normal    HIV 1/2 Ag/Ab Non-Reactive     NT-PRO NATRIURETIC PEPTIDE - Normal    NT-proBNP 154      Narrative:     NOTE:  Access complete set of age - and/or gender-specific reference intervals for this test in the [facility] Collection Manual.   TROPONIN I HIGH SENSITIVITY - Normal    Troponin High Sensitive <3     CBC W/ AUTO DIFFERENTIAL    Narrative:     The following orders were created for panel order CBC auto differential.  Procedure                               Abnormality         Status                     ---------                               -----------         ------                     CBC with Differential[[id number]]       Abnormal            Final result                 Please view results for these tests on the individual orders.   HEP C VIRUS HOLD SPECIMEN   URINALYSIS, REFLEX TO URINE CULTURE   GREY TOP URINE HOLD   URINALYSIS MICROSCOPIC          Imaging Results               CTA Chest Abdomen Non Coronary (XPD) (Final result)  Result time 08/04/25 20:23:21      Final result by Rony Freeman MD (08/04/25 20:23:21)                   Impression:      1. No aortic dissection or aneurysmal " dilatation.  2. 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  Per chart review, this was mentioned in CT report from 2019, however images are not available for direct comparison.  3. Hepatic steatosis.  4. 0.4 cm nonobstructing left renal stone.  5. 0.3 cm left lower lobe pulmonary nodule.  For a solid nodule <6 mm, Fleischner Society 2017 guidelines recommend no routine follow up for a low risk patient, or follow-up with non-contrast chest CT at 12 months in a high risk patient.  6. Additional findings, as above.  This report was flagged in Epic as abnormal.    Electronically signed by resident: Obey Cooper  Date:    08/04/2025  Time:    19:32    Electronically signed by: Rony Freeman  Date:    08/04/2025  Time:    20:23               Narrative:    EXAMINATION:  CTA CHEST ABDOMEN NON CORONARY (XPD)    CLINICAL HISTORY:  Aortic dissection suspected    TECHNIQUE:  Images were obtained from the base of the neck through the pelvis after the administration of 75 cc of  Omnipaque 350 IV contrast. Axial, coronal, and sagittal reconstructions were created from the source data, including MIP reconstructions for the arterial phase images.    COMPARISON:  None    FINDINGS:  VASCULATURE    No dissection or aneurysmal dilatation of the thoracic or abdominal aorta.  Celiac trunk and its branches are well opacified, as well as the SMA, REBECA, and bilateral renal arteries.  Mild calcific and noncalcified atherosclerosis of the aortic arch and abdominal aorta.  Heavy calcific atherosclerosis of the bilateral iliac arteries.  Right common iliac artery has a short segment dissection (series 2, image 720; series 601, image 102) with no extension into the internal or external iliac arteries.  Bilateral common, internal, and external iliac arteries are well opacified.    CHEST, ABDOMEN, PELVIS    LINES/TUBES:  None.    THORACIC SOFT TISSUES: No axillary or  subpectoral lymphadenopathy. Postoperative changes from previous left hemithyroidectomy.  Remaining thyroid tissue appears normal.    HEART AND MEDIASTINUM: No mediastinal or hilar lymphadenopathy. Coronary artery stents in place.  No pericardial effusion..  Left-sided aortic arch. The main pulmonary artery is normal in size.    PLEURA: No pleural effusion or pneumothorax.    LUNGS AND AIRWAYS: Airways are patent.  Mild upper lobe predominant centrilobular and paraseptal emphysematous changes.  0.3 cm right lower lobe pulmonary nodule (series 3, image 252).  Mild bibasilar dependent atelectasis. Pulmonary arteries distribute normally. Pulmonary arteries are sufficiently opacified for diagnostic assessment. There is no pulmonary thromboembolism or filling defect    HEPATOBILIARY: Diffusely hypoattenuating hepatic parenchyma compatible with hepatic steatosis.  No focal hepatic lesions. No biliary ductal dilatation. Normal gallbladder.    SPLEEN: No splenomegaly.  Small splenule noted.    PANCREAS: No focal masses or ductal dilatation.    ADRENALS: No adrenal nodules.    KIDNEYS/URETERS: 0.4 cm nonobstructing left renal stone.  No hydronephrosis or solid mass lesions.    PELVIC ORGANS/BLADDER: Unremarkable.    PERITONEUM / RETROPERITONEUM: No free air or fluid.    LYMPH NODES: No lymphadenopathy.    GI TRACT: No distention or wall thickening. Normal appendix.  Scattered colonic diverticula with no focal stranding to suggest acute diverticulitis.    SOFT TISSUES: Small bilateral fat containing inguinal hernias.  Small fat containing umbilical hernia.    BONES: No fractures or focal osseous lesions.  Multilevel degenerative changes of the spine.                        Preliminary result by Obey Cooper MD (08/04/25 20:07:10)                   Impression:      1. No aortic dissection or aneurysmal dilatation.  2. Short segment right common iliac artery dissection without extension into the internal or external iliac  arteries.  Per chart review, this was mentioned in CT report from 2019, however images are not available for direct comparison.  3. Hepatic steatosis.  4. 0.4 cm nonobstructing left renal stone.  5. 0.3 cm left lower lobe pulmonary nodule.  For a solid nodule <6 mm, Fleischner Society 2017 guidelines recommend no routine follow up for a low risk patient, or follow-up with non-contrast chest CT at 12 months in a high risk patient.  6. Additional findings, as above.  This report was flagged in Epic as abnormal.    Electronically signed by resident: Obey Cooper  Date:    08/04/2025  Time:    19:32                 Narrative:    EXAMINATION:  CTA CHEST ABDOMEN NON CORONARY (XPD)    CLINICAL HISTORY:  Aortic dissection suspected;Aortic aneurysm, known or suspected;    TECHNIQUE:  Images were obtained from the base of the neck through the pelvis after the administration of 75 cc of  Omnipaque 350 IV contrast. Axial, coronal, and sagittal reconstructions were created from the source data, including MIP reconstructions for the arterial phase images.    COMPARISON:  None    FINDINGS:  VASCULATURE    No dissection or aneurysmal dilatation of the thoracic or abdominal aorta.  Celiac trunk and its branches are well opacified, as well as the SMA, REBECA, and bilateral renal arteries.  Mild calcific and noncalcified atherosclerosis of the aortic arch and abdominal aorta.  Heavy calcific atherosclerosis of the bilateral iliac arteries.  Right common iliac artery has a short segment dissection (series 2, image 720; series 601, image 102) with no extension into the internal or external iliac arteries.  Bilateral common, internal, and external iliac arteries are well opacified.    CHEST, ABDOMEN, PELVIS    LINES/TUBES:  None.    THORACIC SOFT TISSUES: No axillary or subpectoral lymphadenopathy. Postoperative changes from previous left hemithyroidectomy.  Remaining thyroid tissue appears normal.    HEART AND MEDIASTINUM: No mediastinal or  hilar lymphadenopathy. Coronary artery stents in place.  No pericardial effusion..  Left-sided aortic arch. The main pulmonary artery is normal in size.    PLEURA: No pleural effusion or pneumothorax.    LUNGS AND AIRWAYS: Airways are patent.  Mild upper lobe predominant centrilobular and paraseptal emphysematous changes.  0.3 cm right lower lobe pulmonary nodule (series 3, image 252).  Mild bibasilar dependent atelectasis. Pulmonary arteries distribute normally. Pulmonary arteries are sufficiently opacified for diagnostic assessment. There is no pulmonary thromboembolism or filling defect    HEPATOBILIARY: Diffusely hypoattenuating hepatic parenchyma compatible with hepatic steatosis.  No focal hepatic lesions. No biliary ductal dilatation. Normal gallbladder.    SPLEEN: No splenomegaly.  Small splenule noted.    PANCREAS: No focal masses or ductal dilatation.    ADRENALS: No adrenal nodules.    KIDNEYS/URETERS: 0.4 cm nonobstructing left renal stone.  No hydronephrosis or solid mass lesions.    PELVIC ORGANS/BLADDER: Unremarkable.    PERITONEUM / RETROPERITONEUM: No free air or fluid.    LYMPH NODES: No lymphadenopathy.    GI TRACT: No distention or wall thickening. Normal appendix.  Scattered colonic diverticula with no focal stranding to suggest acute diverticulitis.    SOFT TISSUES: Small bilateral fat containing inguinal hernias.  Small fat containing umbilical hernia.    BONES: No fractures or focal osseous lesions.  Multilevel degenerative changes of the spine.                                       X-Ray Chest AP Portable (Final result)  Result time 08/04/25 20:10:27      Final result by Kurt Jason MD (08/04/25 20:10:27)                   Impression:      No acute cardiopulmonary abnormality.    Electronically signed by resident: Maco Grijalva  Date:    08/04/2025  Time:    18:52    Electronically signed by: Kurt Jason MD  Date:    08/04/2025  Time:    20:10               Narrative:    EXAMINATION:  XR  CHEST AP PORTABLE    CLINICAL HISTORY:  chest pain;    TECHNIQUE:  Single frontal portable view of the chest was performed.    COMPARISON:  Chest x-ray 04/15/2025 and 08/06/2023.    FINDINGS:  Defibrillator pads overlie the chest.  There also monitoring EKG leads.    The lungs are symmetrically expanded and clear without focal consolidation. No pleural effusion or pneumothorax. Pulmonary vasculature is normal in appearance.  No free air under the diaphragm.    The cardiomediastinal silhouette is normal in size. The hilar and mediastinal contours are normal.    No acute fractures or dislocations.  There are degenerative changes in both shoulders.                                       Medications   nitroGLYCERIN SL tablet 0.4 mg (0 mg Sublingual Override Pull 8/4/25 1830)   aspirin tablet 325 mg (325 mg Oral Given 8/4/25 1821)   LORazepam tablet 1 mg (1 mg Oral Given 8/4/25 1825)   iohexoL (OMNIPAQUE 350) injection 75 mL (75 mLs Intravenous Given 8/4/25 1915)     Medical Decision Making  Ganesh Estrada is a 50 y.o. male who presents with chest pain, and abdominal pain.  On initial evaluation, vital signs are notable for hypertension patient appears to be in mild distress, anxious appearing.  Given code STEMI was called at triage, patient was placed on the defibrillator pads.  Additionally, labs were ordered and Cardiology was at bedside.  Cardiology reviewed the EKG, stated this is not a STEMI and would not like not to activate cath lab.  Low-dose oral Ativan was also given for anxiety immediately, as well as some sublingual nitroglycerin.    Differential includes panic attack, ACS, AAA, nephrolithiasis now obstructing, appendicitis, PE, UTI, bowel obstruction, costochondritis, GERD musculoskeletal pain.    See ED course for specific details regarding ED stay including relevant labs, imaging, EKG results as well as my independent interpretation of those results.        Discharge:  Strict return precautions discussed  with the patient and provided in the AVS.  Discussed PCP, urology, and psychiatry follow up in the next 2-3 days for re-evaluation of symptoms.  All questions asked and answered.  Patient verbalized understanding and are comfortable being discharged.  Patient remained hemodynamically stable in the ED with vital signs that were within normal limits and was discharged in stable condition.       Amount and/or Complexity of Data Reviewed  Labs:  Decision-making details documented in ED Course.  Radiology: ordered. Decision-making details documented in ED Course.  ECG/medicine tests:  Decision-making details documented in ED Course.    Risk  OTC drugs.  Prescription drug management.               ED Course as of 08/04/25 2216   Mon Aug 04, 2025   1826 CBC auto differential(!)  CBC unremarkable [VG]   1832 X-Ray Chest AP Portable  No evidence of pneumothorax, pneumonia, or other intrathoracic pathology   [VG]   1832 On re-evaluation now after oral Ativan, patient feels much better and feels like he is less anxious.  He does continue to complain of discomfort, though is easily redirectable and can easily calmed down [VG]   1835 EKG 12-lead  Initial EKG on arrival shows potential 1 mm elevation in lead V3, though this appears to be a old infarct.  There does not appear to be any acute ischemic changes.  Otherwise shows sinus tachycardia. [VG]   1911 Troponin I High Sensitivity: <3 [VG]   1911 NT-proBNP: 154 [VG]   1911 POCT Venous Blood Gas  Grossly unremarkable [VG]   1914 Comprehensive metabolic panel(!)  Grossly unremarkable [VG]   2058 CTA Chest Abdomen Non Coronary (XPD)(!)  There appears to be a dissecting right common iliac artery, this seems stable from the last CT 2019.  There is also 0.4 mm nonobstructing stone. [VG]   2115 Re-evaluated patient at this time, patient feels much better.  He is aware of his kidney stone.  He will provide a urine sample prior to discharge. [VG]      ED Course User Index  [VG] Good  DO Ney                               Clinical Impression:  Final diagnoses:  [R07.9] Chest pain  [N20.0] Nephrolithiasis (Primary)  [F41.9] Anxiety  [I25.119] Coronary artery disease involving native coronary artery of native heart with angina pectoris          ED Disposition Condition    Discharge Stable          ED Prescriptions       Medication Sig Dispense Start Date End Date Auth. Provider    aspirin (ECOTRIN) 81 MG EC tablet Take 1 tablet (81 mg total) by mouth once daily. 30 tablet 8/4/2025 8/4/2026 Ney Funk DO    clopidogreL (PLAVIX) 75 mg tablet Take 1 tablet (75 mg total) by mouth once daily. 30 tablet 8/4/2025 8/4/2026 Ney Funk DO          Follow-up Information       Follow up With Specialties Details Why Contact Info    [facility] Emergency Medicine  As needed, If symptoms worsen [id number]  [facility] [id number] [phone]                   [1]   Social History  Tobacco Use    Smoking status: Every Day     Current packs/day: 2.00     Average packs/day: 2.0 packs/day for 43.6 years (87.2 ttl pk-yrs)     Types: Cigarettes     Start date: 1982    Smokeless tobacco: Never    Tobacco comments:     Pt declines enrollment in [facility]. Handout provided for Ambulatory Smoking Cessation program.l   Substance Use Topics    Alcohol use: Not Currently    Drug use: Never        Ney Funk DO  Resident  08/04/25 1414

## 2025-08-04 NOTE — FIRST PROVIDER EVALUATION
"Medical screening examination initiated.  I have conducted a focused provider triage encounter, findings are as follows:    Brief history of present illness:  51 yo M with anxiety, and says it is making his heart hurt, history of 3 cardiac stents, very nervous abdominal feeling 10/10  Ran out of valium 2 weeks ago    Vitals:    08/04/25 1750 08/04/25 1810 08/04/25 1811   BP: 135/88  (!) 145/102   Pulse: 100 97 98   Resp: 18 (!) 28 18   Temp: 98.3 °F (36.8 °C)     TempSrc: Oral     SpO2: 99% 99% 99%   Weight: 81.6 kg (180 lb)     Height: 5' 4" (1.626 m)       EKG with subtle ST elevation in anterior leads without reciprocal change, did not call STEMI initially as it does not meet criteria  But patient syncopized, had chest pain so code stemi called and patient moved to the back for repeat EKG, cards evaluation    Pertinent physical exam:  awake, alert    Brief workup plan:  EKG, CXR, IV, labs    Preliminary workup initiated; this workup will be continued and followed by the physician or advanced practice provider that is assigned to the patient when roomed.  "

## 2025-08-04 NOTE — PLAN OF CARE
Patient is a 50 year-old-man with history of CAD s/p PCI, CAD, hypertension, and anxiety, with multiple psychiatric admissions for anxiety (recently seen in [facility] with acute psychosis - complaining about chest pain and left-sided abdominal pain), opioid abuse in the past. Presented today to the ED complaining of chest pain with associated left flank pain for the past 3 days. Patient reports he stop taking his aspirin and plavix 3 days ago (ran out of medications).     Upon arrival to the ED, EKG was performed with no evidence of ST elevation or ST-T changes. Cardiology was called due to concerning history of chest pain in a patient with history of CAD.     Patient hemodynamically stable with /80 mm Hg, HR  bpm. He was complaining about chest pain during the time of my evaluation and chest pain was reproducible to palpation.     Characteristics of chest pain are non-cardiac, however given patients history of CAD and PCI will rule out ACS. At this time will recommend obtaining troponin and if elevated start patient on ACS protocol for NSTEMI, admit patient to hospital medicine and order TTE.     Case discussed with Dr. Vegas, IC staff on call     Jori Junior MD  Cardiology fellow, PGY-5

## 2025-08-04 NOTE — ED NOTES
Walked out of er found in parking lot, states I'm nervous, brought back to EKG room, syncopal episode, now code stemi called

## 2025-08-04 NOTE — ED NOTES
"CP x 3 days with anxiety, states he has not been able to sleep for the 3 days "something has been keeping me up. "Lost my blood thinners 5 days ago." Has not been taking any ASA. Has been taking valium for anxiety.    Patient identifiers for Ganesh Estrada 50 y.o. male checked and correct.  Chief Complaint   Patient presents with    Chest Pain    multiple complaints     Has stents     Past Medical History:   Diagnosis Date    Arthritis      Allergies reported: Review of patient's allergies indicates:  No Known Allergies    Appearance: Pt awake, alert & oriented to person, place & time. Pt in no acute distress at present time. Pt is clean and well groomed with clothes appropriately fastened.   Skin: Skin warm, dry & intact. Color consistent with ethnicity. Mucous membranes moist. No breakdown or brusing noted.   Musculoskeletal: Patient moving all extremities well, no obvious swelling or deformities noted.   Respiratory: Respirations spontaneous, even, and non-labored. Visible chest rise noted. Airway is open and patent. No accessory muscle use noted.   Neurologic: Sensation is intact. Speech is clear and appropriate. Eyes open spontaneously, behavior appropriate to situation, follows commands, facial expression symmetrical, bilateral hand grasp equal and even, purposeful motor response noted.  Cardiac: All peripheral pulses present. No Bilateral lower extremity edema. Cap refill is <3 seconds.  Abdomen: Abdomen soft, non distended, non tender to palpation.   : Pt voids independently, denies dysuria, hematuria, frequency.    "

## 2025-08-05 LAB
HOLD SPECIMEN: NORMAL
OHS QRS DURATION: 76 MS
OHS QRS DURATION: 76 MS
OHS QRS DURATION: 80 MS
OHS QRS DURATION: 88 MS
OHS QTC CALCULATION: 443 MS
OHS QTC CALCULATION: 444 MS
OHS QTC CALCULATION: 449 MS
OHS QTC CALCULATION: 453 MS

## 2025-08-05 NOTE — DISCHARGE INSTRUCTIONS
You were seen in the emergency department today for anxiety, chest pain, and abdominal pain.  Your labs and imaging came back normal for any heart related problems, you were found to have a small kidney stone in your left kidney.  This is a nonobstructive kidney stone, and it should pass on its own.  We have provided you with a urology referral, please see them in the next month or so or if your symptoms worsen.  Additionally, to help manage her anxiety, I have provided you with a psychiatry referral.  Please see them in the next month or so in order to start therapy and/or a new treatment plan for your anxiety.  I have also refilled her aspirin and your Plavix.  It is very important that you take this every single day in order to keep your heart functioning well.  Please return to the emergency department for any new and worsening chest pain, shortness of breath, sudden headaches, or any other symptoms that are concerning to you.

## 2025-08-07 LAB — HOLD SPECIMEN: NORMAL
